# Patient Record
Sex: FEMALE | Race: WHITE | NOT HISPANIC OR LATINO | Employment: FULL TIME | URBAN - METROPOLITAN AREA
[De-identification: names, ages, dates, MRNs, and addresses within clinical notes are randomized per-mention and may not be internally consistent; named-entity substitution may affect disease eponyms.]

---

## 2017-10-09 ENCOUNTER — CONVERSION ENCOUNTER (OUTPATIENT)
Dept: MAMMOGRAPHY | Facility: HOSPITAL | Age: 62
End: 2017-10-09

## 2018-02-16 ENCOUNTER — OFFICE VISIT CONVERTED (OUTPATIENT)
Dept: FAMILY MEDICINE CLINIC | Facility: CLINIC | Age: 63
End: 2018-02-16
Attending: FAMILY MEDICINE

## 2018-04-16 ENCOUNTER — OFFICE VISIT CONVERTED (OUTPATIENT)
Dept: FAMILY MEDICINE CLINIC | Facility: CLINIC | Age: 63
End: 2018-04-16
Attending: FAMILY MEDICINE

## 2018-06-11 ENCOUNTER — OFFICE VISIT CONVERTED (OUTPATIENT)
Dept: FAMILY MEDICINE CLINIC | Facility: CLINIC | Age: 63
End: 2018-06-11
Attending: FAMILY MEDICINE

## 2018-06-15 ENCOUNTER — OFFICE VISIT CONVERTED (OUTPATIENT)
Dept: ORTHOPEDIC SURGERY | Facility: CLINIC | Age: 63
End: 2018-06-15
Attending: PHYSICIAN ASSISTANT

## 2018-07-17 ENCOUNTER — OFFICE VISIT CONVERTED (OUTPATIENT)
Dept: FAMILY MEDICINE CLINIC | Facility: CLINIC | Age: 63
End: 2018-07-17
Attending: FAMILY MEDICINE

## 2018-07-17 ENCOUNTER — CONVERSION ENCOUNTER (OUTPATIENT)
Dept: FAMILY MEDICINE CLINIC | Facility: CLINIC | Age: 63
End: 2018-07-17

## 2018-09-10 ENCOUNTER — OFFICE VISIT CONVERTED (OUTPATIENT)
Dept: FAMILY MEDICINE CLINIC | Facility: CLINIC | Age: 63
End: 2018-09-10
Attending: FAMILY MEDICINE

## 2018-10-15 ENCOUNTER — OFFICE VISIT CONVERTED (OUTPATIENT)
Dept: FAMILY MEDICINE CLINIC | Facility: CLINIC | Age: 63
End: 2018-10-15
Attending: FAMILY MEDICINE

## 2018-10-29 ENCOUNTER — OFFICE VISIT CONVERTED (OUTPATIENT)
Dept: ORTHOPEDIC SURGERY | Facility: CLINIC | Age: 63
End: 2018-10-29
Attending: ORTHOPAEDIC SURGERY

## 2018-11-19 ENCOUNTER — CONVERSION ENCOUNTER (OUTPATIENT)
Dept: MAMMOGRAPHY | Facility: HOSPITAL | Age: 63
End: 2018-11-19

## 2018-11-26 ENCOUNTER — CONVERSION ENCOUNTER (OUTPATIENT)
Dept: MAMMOGRAPHY | Facility: HOSPITAL | Age: 63
End: 2018-11-26

## 2018-12-28 ENCOUNTER — CONVERSION ENCOUNTER (OUTPATIENT)
Dept: FAMILY MEDICINE CLINIC | Facility: CLINIC | Age: 63
End: 2018-12-28

## 2018-12-28 ENCOUNTER — OFFICE VISIT CONVERTED (OUTPATIENT)
Dept: FAMILY MEDICINE CLINIC | Facility: CLINIC | Age: 63
End: 2018-12-28
Attending: FAMILY MEDICINE

## 2019-04-03 ENCOUNTER — HOSPITAL ENCOUNTER (OUTPATIENT)
Dept: URGENT CARE | Facility: CLINIC | Age: 64
Discharge: HOME | End: 2019-04-03
Attending: NURSE PRACTITIONER

## 2019-04-05 LAB — BACTERIA SPEC AEROBE CULT: NORMAL

## 2019-06-28 ENCOUNTER — CONVERSION ENCOUNTER (OUTPATIENT)
Dept: FAMILY MEDICINE CLINIC | Facility: CLINIC | Age: 64
End: 2019-06-28

## 2019-06-28 ENCOUNTER — OFFICE VISIT CONVERTED (OUTPATIENT)
Dept: FAMILY MEDICINE CLINIC | Facility: CLINIC | Age: 64
End: 2019-06-28
Attending: FAMILY MEDICINE

## 2019-07-01 ENCOUNTER — HOSPITAL ENCOUNTER (OUTPATIENT)
Dept: OTHER | Facility: HOSPITAL | Age: 64
Discharge: HOME | End: 2019-07-01
Attending: FAMILY MEDICINE

## 2019-07-01 LAB
ALBUMIN SERPL-MCNC: 4 G/DL (ref 3.5–5)
ALBUMIN/GLOB SERPL: 1.3 {RATIO} (ref 1.4–2.6)
ALP SERPL-CCNC: 133 U/L (ref 43–160)
ALT SERPL-CCNC: 13 U/L (ref 10–40)
ANION GAP SERPL CALC-SCNC: 18 MMOL/L (ref 8–19)
AST SERPL-CCNC: 13 U/L (ref 15–50)
BILIRUB SERPL-MCNC: 0.35 MG/DL (ref 0.2–1.3)
BUN SERPL-MCNC: 20 MG/DL (ref 5–25)
BUN/CREAT SERPL: 21 {RATIO} (ref 6–20)
CALCIUM SERPL-MCNC: 9.2 MG/DL (ref 8.7–10.4)
CHLORIDE SERPL-SCNC: 106 MMOL/L (ref 99–111)
CHOLEST SERPL-MCNC: 130 MG/DL (ref 107–200)
CHOLEST/HDLC SERPL: 4.8 {RATIO} (ref 3–6)
CONV CO2: 25 MMOL/L (ref 22–32)
CONV TOTAL PROTEIN: 7.1 G/DL (ref 6.3–8.2)
CREAT UR-MCNC: 0.97 MG/DL (ref 0.5–0.9)
GFR SERPLBLD BASED ON 1.73 SQ M-ARVRAT: >60 ML/MIN/{1.73_M2}
GLOBULIN UR ELPH-MCNC: 3.1 G/DL (ref 2–3.5)
GLUCOSE SERPL-MCNC: 86 MG/DL (ref 65–99)
HDLC SERPL-MCNC: 27 MG/DL (ref 40–60)
LDLC SERPL CALC-MCNC: 73 MG/DL (ref 70–100)
OSMOLALITY SERPL CALC.SUM OF ELEC: 302 MOSM/KG (ref 273–304)
POTASSIUM SERPL-SCNC: 3.7 MMOL/L (ref 3.5–5.3)
SODIUM SERPL-SCNC: 145 MMOL/L (ref 135–147)
TRIGL SERPL-MCNC: 149 MG/DL (ref 40–150)
VLDLC SERPL-MCNC: 30 MG/DL (ref 5–37)

## 2019-08-30 ENCOUNTER — OFFICE VISIT CONVERTED (OUTPATIENT)
Dept: FAMILY MEDICINE CLINIC | Facility: CLINIC | Age: 64
End: 2019-08-30
Attending: FAMILY MEDICINE

## 2019-10-25 ENCOUNTER — CONVERSION ENCOUNTER (OUTPATIENT)
Dept: FAMILY MEDICINE CLINIC | Facility: CLINIC | Age: 64
End: 2019-10-25

## 2019-10-25 ENCOUNTER — OFFICE VISIT CONVERTED (OUTPATIENT)
Dept: FAMILY MEDICINE CLINIC | Facility: CLINIC | Age: 64
End: 2019-10-25
Attending: FAMILY MEDICINE

## 2019-11-22 ENCOUNTER — OFFICE VISIT CONVERTED (OUTPATIENT)
Dept: ORTHOPEDIC SURGERY | Facility: CLINIC | Age: 64
End: 2019-11-22
Attending: ORTHOPAEDIC SURGERY

## 2019-12-30 ENCOUNTER — HOSPITAL ENCOUNTER (OUTPATIENT)
Dept: OTHER | Facility: HOSPITAL | Age: 64
Discharge: HOME | End: 2019-12-30
Attending: FAMILY MEDICINE

## 2019-12-30 ENCOUNTER — OFFICE VISIT CONVERTED (OUTPATIENT)
Dept: FAMILY MEDICINE CLINIC | Facility: CLINIC | Age: 64
End: 2019-12-30
Attending: FAMILY MEDICINE

## 2019-12-30 LAB
ALBUMIN SERPL-MCNC: 4 G/DL (ref 3.5–5)
ALBUMIN/GLOB SERPL: 1.2 {RATIO} (ref 1.4–2.6)
ALP SERPL-CCNC: 137 U/L (ref 43–160)
ALT SERPL-CCNC: 15 U/L (ref 10–40)
ANION GAP SERPL CALC-SCNC: 18 MMOL/L (ref 8–19)
AST SERPL-CCNC: 16 U/L (ref 15–50)
BILIRUB SERPL-MCNC: 0.36 MG/DL (ref 0.2–1.3)
BUN SERPL-MCNC: 16 MG/DL (ref 5–25)
BUN/CREAT SERPL: 17 {RATIO} (ref 6–20)
CALCIUM SERPL-MCNC: 9.4 MG/DL (ref 8.7–10.4)
CHLORIDE SERPL-SCNC: 101 MMOL/L (ref 99–111)
CHOLEST SERPL-MCNC: 122 MG/DL (ref 107–200)
CHOLEST/HDLC SERPL: 3.8 {RATIO} (ref 3–6)
CONV CO2: 23 MMOL/L (ref 22–32)
CONV TOTAL PROTEIN: 7.4 G/DL (ref 6.3–8.2)
CREAT UR-MCNC: 0.93 MG/DL (ref 0.5–0.9)
GFR SERPLBLD BASED ON 1.73 SQ M-ARVRAT: >60 ML/MIN/{1.73_M2}
GLOBULIN UR ELPH-MCNC: 3.4 G/DL (ref 2–3.5)
GLUCOSE SERPL-MCNC: 119 MG/DL (ref 65–99)
HDLC SERPL-MCNC: 32 MG/DL (ref 40–60)
LDLC SERPL CALC-MCNC: 52 MG/DL (ref 70–100)
OSMOLALITY SERPL CALC.SUM OF ELEC: 290 MOSM/KG (ref 273–304)
POTASSIUM SERPL-SCNC: 3.4 MMOL/L (ref 3.5–5.3)
SODIUM SERPL-SCNC: 139 MMOL/L (ref 135–147)
TRIGL SERPL-MCNC: 191 MG/DL (ref 40–150)
VLDLC SERPL-MCNC: 38 MG/DL (ref 5–37)

## 2020-01-24 ENCOUNTER — HOSPITAL ENCOUNTER (OUTPATIENT)
Dept: GENERAL RADIOLOGY | Facility: HOSPITAL | Age: 65
Discharge: HOME | End: 2020-01-24
Attending: FAMILY MEDICINE

## 2020-02-03 ENCOUNTER — OFFICE VISIT CONVERTED (OUTPATIENT)
Dept: FAMILY MEDICINE CLINIC | Facility: CLINIC | Age: 65
End: 2020-02-03
Attending: FAMILY MEDICINE

## 2020-05-08 ENCOUNTER — TELEMEDICINE CONVERTED (OUTPATIENT)
Dept: FAMILY MEDICINE CLINIC | Facility: CLINIC | Age: 65
End: 2020-05-08
Attending: FAMILY MEDICINE

## 2020-06-05 ENCOUNTER — OFFICE VISIT CONVERTED (OUTPATIENT)
Dept: ORTHOPEDIC SURGERY | Facility: CLINIC | Age: 65
End: 2020-06-05
Attending: PHYSICIAN ASSISTANT

## 2020-07-09 ENCOUNTER — OFFICE VISIT CONVERTED (OUTPATIENT)
Dept: FAMILY MEDICINE CLINIC | Facility: CLINIC | Age: 65
End: 2020-07-09
Attending: FAMILY MEDICINE

## 2020-07-28 ENCOUNTER — OFFICE VISIT CONVERTED (OUTPATIENT)
Dept: FAMILY MEDICINE CLINIC | Facility: CLINIC | Age: 65
End: 2020-07-28
Attending: INTERNAL MEDICINE

## 2020-07-28 ENCOUNTER — CONVERSION ENCOUNTER (OUTPATIENT)
Dept: FAMILY MEDICINE CLINIC | Facility: CLINIC | Age: 65
End: 2020-07-28

## 2020-08-08 ENCOUNTER — HOSPITAL ENCOUNTER (OUTPATIENT)
Dept: OTHER | Facility: HOSPITAL | Age: 65
Discharge: HOME | End: 2020-08-08
Attending: INTERNAL MEDICINE

## 2020-08-08 LAB
25(OH)D3 SERPL-MCNC: 15.6 NG/ML (ref 30–100)
ALBUMIN SERPL-MCNC: 3.8 G/DL (ref 3.5–5)
ALBUMIN/GLOB SERPL: 1.2 {RATIO} (ref 1.4–2.6)
ALP SERPL-CCNC: 117 U/L (ref 43–160)
ALT SERPL-CCNC: 13 U/L (ref 10–40)
ANION GAP SERPL CALC-SCNC: 18 MMOL/L (ref 8–19)
AST SERPL-CCNC: 14 U/L (ref 15–50)
BASOPHILS # BLD AUTO: 0.06 10*3/UL (ref 0–0.2)
BASOPHILS NFR BLD AUTO: 0.9 % (ref 0–3)
BILIRUB SERPL-MCNC: 0.48 MG/DL (ref 0.2–1.3)
BUN SERPL-MCNC: 21 MG/DL (ref 5–25)
BUN/CREAT SERPL: 19 {RATIO} (ref 6–20)
CALCIUM SERPL-MCNC: 10 MG/DL (ref 8.7–10.4)
CHLORIDE SERPL-SCNC: 107 MMOL/L (ref 99–111)
CHOLEST SERPL-MCNC: 135 MG/DL (ref 107–200)
CHOLEST/HDLC SERPL: 5.4 {RATIO} (ref 3–6)
CONV ABS IMM GRAN: 0.03 10*3/UL (ref 0–0.2)
CONV CO2: 20 MMOL/L (ref 22–32)
CONV IMMATURE GRAN: 0.4 % (ref 0–1.8)
CONV TOTAL PROTEIN: 7.1 G/DL (ref 6.3–8.2)
CREAT UR-MCNC: 1.08 MG/DL (ref 0.5–0.9)
DEPRECATED RDW RBC AUTO: 48.7 FL (ref 36.4–46.3)
EOSINOPHIL # BLD AUTO: 0.28 10*3/UL (ref 0–0.7)
EOSINOPHIL # BLD AUTO: 4.1 % (ref 0–7)
ERYTHROCYTE [DISTWIDTH] IN BLOOD BY AUTOMATED COUNT: 14.7 % (ref 11.7–14.4)
EST. AVERAGE GLUCOSE BLD GHB EST-MCNC: 111 MG/DL
GFR SERPLBLD BASED ON 1.73 SQ M-ARVRAT: 55 ML/MIN/{1.73_M2}
GLOBULIN UR ELPH-MCNC: 3.3 G/DL (ref 2–3.5)
GLUCOSE SERPL-MCNC: 98 MG/DL (ref 65–99)
HBA1C MFR BLD: 5.5 % (ref 3.5–5.7)
HCT VFR BLD AUTO: 37.7 % (ref 37–47)
HDLC SERPL-MCNC: 25 MG/DL (ref 40–60)
HGB BLD-MCNC: 12.2 G/DL (ref 12–16)
LDLC SERPL CALC-MCNC: 77 MG/DL (ref 70–100)
LYMPHOCYTES # BLD AUTO: 1.89 10*3/UL (ref 1–5)
LYMPHOCYTES NFR BLD AUTO: 28 % (ref 20–45)
MCH RBC QN AUTO: 29.6 PG (ref 27–31)
MCHC RBC AUTO-ENTMCNC: 32.4 G/DL (ref 33–37)
MCV RBC AUTO: 91.5 FL (ref 81–99)
MONOCYTES # BLD AUTO: 0.56 10*3/UL (ref 0.2–1.2)
MONOCYTES NFR BLD AUTO: 8.3 % (ref 3–10)
NEUTROPHILS # BLD AUTO: 3.93 10*3/UL (ref 2–8)
NEUTROPHILS NFR BLD AUTO: 58.3 % (ref 30–85)
NRBC CBCN: 0 % (ref 0–0.7)
OSMOLALITY SERPL CALC.SUM OF ELEC: 295 MOSM/KG (ref 273–304)
PLATELET # BLD AUTO: 219 10*3/UL (ref 130–400)
PMV BLD AUTO: 11.1 FL (ref 9.4–12.3)
POTASSIUM SERPL-SCNC: 4 MMOL/L (ref 3.5–5.3)
RBC # BLD AUTO: 4.12 10*6/UL (ref 4.2–5.4)
SODIUM SERPL-SCNC: 141 MMOL/L (ref 135–147)
TRIGL SERPL-MCNC: 164 MG/DL (ref 40–150)
VLDLC SERPL-MCNC: 33 MG/DL (ref 5–37)
WBC # BLD AUTO: 6.75 10*3/UL (ref 4.8–10.8)

## 2020-09-25 ENCOUNTER — HOSPITAL ENCOUNTER (OUTPATIENT)
Dept: OTHER | Facility: HOSPITAL | Age: 65
Discharge: HOME | End: 2020-09-25
Attending: INTERNAL MEDICINE

## 2020-09-25 LAB
ANION GAP SERPL CALC-SCNC: 14 MMOL/L (ref 8–19)
BUN SERPL-MCNC: 14 MG/DL (ref 5–25)
BUN/CREAT SERPL: 15 {RATIO} (ref 6–20)
CALCIUM SERPL-MCNC: 8.6 MG/DL (ref 8.7–10.4)
CHLORIDE SERPL-SCNC: 105 MMOL/L (ref 99–111)
CHOLEST SERPL-MCNC: 123 MG/DL (ref 107–200)
CHOLEST/HDLC SERPL: 4.6 {RATIO} (ref 3–6)
CONV CO2: 26 MMOL/L (ref 22–32)
CREAT UR-MCNC: 0.96 MG/DL (ref 0.5–0.9)
GFR SERPLBLD BASED ON 1.73 SQ M-ARVRAT: >60 ML/MIN/{1.73_M2}
GLUCOSE SERPL-MCNC: 93 MG/DL (ref 65–99)
HDLC SERPL-MCNC: 27 MG/DL (ref 40–60)
LDLC SERPL CALC-MCNC: 73 MG/DL (ref 70–100)
OSMOLALITY SERPL CALC.SUM OF ELEC: 294 MOSM/KG (ref 273–304)
POTASSIUM SERPL-SCNC: 2.8 MMOL/L (ref 3.5–5.3)
SODIUM SERPL-SCNC: 142 MMOL/L (ref 135–147)
TRIGL SERPL-MCNC: 117 MG/DL (ref 40–150)
VLDLC SERPL-MCNC: 23 MG/DL (ref 5–37)

## 2020-11-23 ENCOUNTER — OFFICE VISIT CONVERTED (OUTPATIENT)
Dept: ORTHOPEDIC SURGERY | Facility: CLINIC | Age: 65
End: 2020-11-23
Attending: PHYSICIAN ASSISTANT

## 2020-12-22 ENCOUNTER — OFFICE VISIT CONVERTED (OUTPATIENT)
Dept: FAMILY MEDICINE CLINIC | Facility: CLINIC | Age: 65
End: 2020-12-22
Attending: INTERNAL MEDICINE

## 2021-02-12 ENCOUNTER — OFFICE VISIT CONVERTED (OUTPATIENT)
Dept: FAMILY MEDICINE CLINIC | Facility: CLINIC | Age: 66
End: 2021-02-12
Attending: NURSE PRACTITIONER

## 2021-02-27 ENCOUNTER — HOSPITAL ENCOUNTER (OUTPATIENT)
Dept: OTHER | Facility: HOSPITAL | Age: 66
Discharge: HOME | End: 2021-02-27
Attending: NURSE PRACTITIONER

## 2021-02-27 LAB
ALBUMIN SERPL-MCNC: 4 G/DL (ref 3.5–5)
ALBUMIN/GLOB SERPL: 1.2 {RATIO} (ref 1.4–2.6)
ALP SERPL-CCNC: 144 U/L (ref 43–160)
ALT SERPL-CCNC: 12 U/L (ref 10–40)
ANION GAP SERPL CALC-SCNC: 13 MMOL/L (ref 8–19)
AST SERPL-CCNC: 13 U/L (ref 15–50)
BASOPHILS # BLD AUTO: 0.09 10*3/UL (ref 0–0.2)
BASOPHILS NFR BLD AUTO: 0.9 % (ref 0–3)
BILIRUB SERPL-MCNC: 0.45 MG/DL (ref 0.2–1.3)
BUN SERPL-MCNC: 19 MG/DL (ref 5–25)
BUN/CREAT SERPL: 19 {RATIO} (ref 6–20)
CALCIUM SERPL-MCNC: 9.1 MG/DL (ref 8.7–10.4)
CHLORIDE SERPL-SCNC: 108 MMOL/L (ref 99–111)
CHOLEST SERPL-MCNC: 142 MG/DL (ref 107–200)
CHOLEST/HDLC SERPL: 4.1 {RATIO} (ref 3–6)
CONV ABS IMM GRAN: 0.04 10*3/UL (ref 0–0.2)
CONV CO2: 24 MMOL/L (ref 22–32)
CONV IMMATURE GRAN: 0.4 % (ref 0–1.8)
CONV TOTAL PROTEIN: 7.4 G/DL (ref 6.3–8.2)
CREAT UR-MCNC: 1.01 MG/DL (ref 0.5–0.9)
DEPRECATED RDW RBC AUTO: 47.7 FL (ref 36.4–46.3)
EOSINOPHIL # BLD AUTO: 0.15 10*3/UL (ref 0–0.7)
EOSINOPHIL # BLD AUTO: 1.6 % (ref 0–7)
ERYTHROCYTE [DISTWIDTH] IN BLOOD BY AUTOMATED COUNT: 13.9 % (ref 11.7–14.4)
GFR SERPLBLD BASED ON 1.73 SQ M-ARVRAT: 59 ML/MIN/{1.73_M2}
GLOBULIN UR ELPH-MCNC: 3.4 G/DL (ref 2–3.5)
GLUCOSE SERPL-MCNC: 87 MG/DL (ref 65–99)
HCT VFR BLD AUTO: 42.1 % (ref 37–47)
HDLC SERPL-MCNC: 35 MG/DL (ref 40–60)
HGB BLD-MCNC: 13.4 G/DL (ref 12–16)
LDLC SERPL CALC-MCNC: 89 MG/DL (ref 70–100)
LYMPHOCYTES # BLD AUTO: 2.07 10*3/UL (ref 1–5)
LYMPHOCYTES NFR BLD AUTO: 21.5 % (ref 20–45)
MCH RBC QN AUTO: 29.6 PG (ref 27–31)
MCHC RBC AUTO-ENTMCNC: 31.8 G/DL (ref 33–37)
MCV RBC AUTO: 93.1 FL (ref 81–99)
MONOCYTES # BLD AUTO: 0.58 10*3/UL (ref 0.2–1.2)
MONOCYTES NFR BLD AUTO: 6 % (ref 3–10)
NEUTROPHILS # BLD AUTO: 6.7 10*3/UL (ref 2–8)
NEUTROPHILS NFR BLD AUTO: 69.6 % (ref 30–85)
NRBC CBCN: 0 % (ref 0–0.7)
OSMOLALITY SERPL CALC.SUM OF ELEC: 294 MOSM/KG (ref 273–304)
PLATELET # BLD AUTO: 272 10*3/UL (ref 130–400)
PMV BLD AUTO: 10.5 FL (ref 9.4–12.3)
POTASSIUM SERPL-SCNC: 4.3 MMOL/L (ref 3.5–5.3)
RBC # BLD AUTO: 4.52 10*6/UL (ref 4.2–5.4)
SODIUM SERPL-SCNC: 141 MMOL/L (ref 135–147)
TRIGL SERPL-MCNC: 89 MG/DL (ref 40–150)
TSH SERPL-ACNC: 2.57 M[IU]/L (ref 0.27–4.2)
VLDLC SERPL-MCNC: 18 MG/DL (ref 5–37)
WBC # BLD AUTO: 9.63 10*3/UL (ref 4.8–10.8)

## 2021-04-12 ENCOUNTER — OFFICE VISIT CONVERTED (OUTPATIENT)
Dept: FAMILY MEDICINE CLINIC | Facility: CLINIC | Age: 66
End: 2021-04-12
Attending: PHYSICIAN ASSISTANT

## 2021-04-12 ENCOUNTER — HOSPITAL ENCOUNTER (OUTPATIENT)
Dept: GENERAL RADIOLOGY | Facility: HOSPITAL | Age: 66
Discharge: HOME | End: 2021-04-12
Attending: PHYSICIAN ASSISTANT

## 2021-04-30 ENCOUNTER — HOSPITAL ENCOUNTER (OUTPATIENT)
Dept: GENERAL RADIOLOGY | Facility: HOSPITAL | Age: 66
Discharge: HOME | End: 2021-04-30
Attending: NURSE PRACTITIONER

## 2021-05-07 ENCOUNTER — CONVERSION ENCOUNTER (OUTPATIENT)
Dept: ORTHOPEDIC SURGERY | Facility: CLINIC | Age: 66
End: 2021-05-07

## 2021-05-07 ENCOUNTER — OFFICE VISIT CONVERTED (OUTPATIENT)
Dept: ORTHOPEDIC SURGERY | Facility: CLINIC | Age: 66
End: 2021-05-07
Attending: ORTHOPAEDIC SURGERY

## 2021-05-10 NOTE — H&P
History and Physical      Patient Name: Loan Nam   Patient ID: [id number]   Sex: Female   YOB: 1955    Primary Care Provider: Willy Kumar DO   Referring Provider: Willy Kumar DO    Visit Date: July 28, 2020    Provider: Willy Kumar DO   Location: [facility]   Location Address: [address] [place] [address], [address]  [place], [place]  [id number]   Location Phone: [phone]          Chief Complaint  · New Patient/Establish Care      History Of Present Illness  Loan Nam is a 64 year old /White female who presents for evaluation and treatment of:      Pt is here to establish care. Pt previous PCP was Dr. Oracio Mccoy.    Pt states that she feels like she not over her sinus infection. She feels like she can smell the infection. Patient reports some sinus pressure and congestion.    Pt states that she would like to take less pills for her BP. Pt states that the last medication she was given the amlodipine caused her feet to swell a lot. Patient currently on the 10 mg dosing.    Pt would like a suggestion on how to wear her mask or face shield while she at work because it gets hot during the day. Pt states that she works at [employer]. Pt states that she can barely in the mask because she has asthma and sinus infection. Pt states that she wears gator mask all day.    Pt has history of HTN, depression, asthma, bronchitis, acid reflex, and osteoarthritis.     Pt had flu vaccine 9/2019. Pt not due for mammogram until 10/2020.       Past Medical History  Disease Name Date Onset Notes   Allergic rhinitis --  --    Anemia, Unspecified --  --    Arthritis of Left Knee 07/28/2016 --    Asthma --  --    Bile salt-induced diarrhea --  --    Depression --  --    History of Pulmonary Embolism --  --    Hypertension --  --    Moderate episode of recurrent major depressive disorder 02/03/2020 --    Muscle cramp, nocturnal 07/17/2018 --    Reflux --  --    Seasonal allergies --  --     Venous insufficiency of left leg 07/17/2018 She needs to elevated nad wear compression hose. 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         Past Surgical History  Procedure Name Date Notes   *Other Surgery --  mass removal on neck    Appendectomy 1977 --    C-sections 1977 --    Cesarian Section --  --    Cholecystectomy 1977 --    Gallbladder --  --    Hysterectomy 2004 --    Tonsillectomy --  --          Medication List  Name Date Started Instructions   amlodipine 5 mg oral tablet 07/28/2020 take 1 tablet (5 mg) by oral route once daily for 90 days   buspirone 15 mg oral tablet 07/28/2020 take 1 tablet (15 mg) by oral route 2 times per day for 90 days   doxycycline monohydrate 100 mg oral capsule 07/28/2020 take 1 capsule (100 mg) by oral route 2 times per day for 10 days   hydrochlorothiazide 25 mg oral tablet 07/28/2020 take 1 tablet (25 mg) by oral route once daily for 90 days   lisinopril 40 mg oral tablet 07/28/2020 take 1 tablet (40 mg) by oral route once daily for 90 days   naproxen 500 mg oral tablet 07/28/2020 take 1 tablet (500 mg) by oral route 2 times per day with food for 90 days   omeprazole 20 mg oral capsule,delayed release(DR/EC) 07/28/2020 take 1 capsule (20 mg) by oral route once daily before a meal for 90 days   Ventolin HFA 90 mcg/actuation inhalation HFA aerosol inhaler 10/25/2019 inhale 1 - 2 puffs (90 - 180 mcg) by inhalation route every 6 hours as needed for 30 days         Allergy List  Allergen Name Date Reaction Notes   Allergy to IVP dye --  --  --    PENICILLINS --  --  --    SULFA (SULFONAMIDES) --  --  --          Family Medical History  Disease Name Relative/Age Notes   Hypertension Father/  Mother/  Sister/   --    Cancer, Unspecified Father/  Mother/  Sister/   Mother; Father; Sister  Mother; Father   Blood Diseases Father/   Father   Family history of certain chronic disabling diseases; arthritis Mother/  Sister/   Mother; Sister  Mother; Sister  Mother;  Sister; Son   Osteoporosis Mother/   Mother   Family history of Arthritis Mother/   Mother         Reproductive History  Menstrual   Age Menarche: 14   Pregnancy Summary   Total Pregnancies: 1 Full Term: 1 Premature: 0   Ab Induced: 0 Ab Spontaneous: 0 Ectopics: 0   Multiples: 0 Livin         Social History  Finding Status Start/Stop Quantity Notes   Alcohol Never --/-- --  2020 - 10/15/2018 -     --  --/-- --  --    lives alone --  --/-- --  --    No known infection risk --  --/-- --  --    Recreational Drug Use Never --/-- --  no   Sedentary --  --/-- --  --    Tobacco Never --/-- --  never smoker   Working --  --/-- --  --          Immunizations  NameDate Admin Mfg Trade Name Lot Number Route Inj VIS Given VIS Publication   Smmteblym96/30/2019 SKB Fluarix, quadrivalent, preservative free [id number] NE NE 2019    Comments: [facility]   Akzqsjlkq86/16/2014 NE FLUVIRIN [id number]  RA 10/06/2014    Comments: RECIEVED AT [facility]   Tqysgzwgfchq14/06/2014 MSD PNEUMOVAX 23 [id number]  RD 2014 10/06/2009   Comments: tolerated well   Tdap2010 SKB BOOSTRIX  NE NE 2015   Comments:          Review of Systems  · Constitutional  o Denies  o : fatigue, night sweats  · Eyes  o Denies  o : double vision, blurred vision  · HENT  o Admits  o : sinus pain, nasal congestion, nasal discharge, postnasal drip  o Denies  o : vertigo, recent head injury, ear pain  · Cardiovascular  o Admits  o : lower extremity edema  o Denies  o : chest pain, irregular heart beats, dyspnea on exertion, orthopnea  · Respiratory  o Denies  o : shortness of breath, productive cough  · Gastrointestinal  o Denies  o : nausea, vomiting  · Genitourinary  o Denies  o : dysuria, urinary retention  · Integument  o Denies  o : hair growth change, new skin lesions  · Neurologic  o Denies  o : altered mental status, seizures  · Musculoskeletal  o Denies  o : joint swelling, limitation of  "motion  · Endocrine  o Denies  o : cold intolerance, heat intolerance  · Psychiatric  o Denies  o : anxiety, depression  · Heme-Lymph  o Denies  o : petechiae, lymph node enlargement or tenderness  · Allergic-Immunologic  o Denies  o : frequent illnesses      Vitals  Date Time BP Position Site L\R Cuff Size HR RR TEMP (F) WT  HT  BMI kg/m2 BSA m2 O2 Sat HC       07/28/2020 02:05 /75 Sitting    77 - R   230lbs 2oz 4'  11\" 46.48 2.08 100 %          Physical Examination  · Constitutional  o Appearance  o : alert, oriented, in no acute distress, well developed, well-nourished  · Eyes  o Vision  o : Conjuntivae: Normal, Sclerae white, Pupils: PERRL, Cornea: Clear, no lesions bilateral  · Ears, Nose, Mouth and Throat  o Ears  o : Ext. Ears: Normal shape, Non tender, EACs: Normal , Tragus intact bilaterally, Hearing: intact to conversational voice bilaterally  o Nose  o : No nasal discharge, Mucosa: normal, Septum: midline, Sinuses: TTP in the frontal and maxillary sinuses  o Throat  o : Oropharynx: no inflmation or lesions, no purulence or drainage  · Neck  o Inspection/Palpation  o : Supple, no masses or tenderness, no deformities, Trachea: Midline, ROM: with in normal limits, no neck stiffness, no lymphadenopathy  o Thyroid  o : no thyomegaly, no palpabale masses   · Respiratory  o Auscultation of Lungs  o : normal breath sounds throughout, no wheeze, rhonchi, or crackles  · Cardiovascular  o Heart  o : Regular rate and rhythm, Normal S1,S2 , No cardiac murmers, No S3 or S4 gallop or rubs  · Gastrointestinal  o Abdominal Examination  o : abdomen soft, nontender, non distended, no rigidity, gaurding, rebound tenderness, no ventral hernias present  o Liver and spleen  o : no hepatomegaly present, liver nontender to palpation, spleen not palpable  · Skin and Subcutaneous Tissue  o General Inspection  o : no rashes on visible skin, normal skin color, warm and dry  o Digits and Nails  o : no clubbing, cyanosis, " deformities or edema present, normal appearing nails  · Neurologic  o Mental Status Examination  o : alert and oriented to time, place, and person. Gait and Station: normal gait, able to stand without difficulty. CN 2-12 grossly intact   · Psychiatric  o Judgment and Insight  o : judgment and insight intact  o Mood and Affect  o : normal mood and affect          Assessment  · Visit for screening mammogram     V76.12/Z12.31  · Anemia     285.9/D64.9  · Anxiety disorder     300.00/F41.9  · Essential hypertension     401.9/I10  Given significant swelling on 10 mg of amlodipine will decrease to 5 mg daily and increase the HCTZ to 25 mg daily.  · Vitamin D deficiency     268.9/E55.9  · Establishing care with new doctor, encounter for     V65.8/Z76.89  · Allergic rhinitis     477.9/J30.9  · Hypertension     401.9/I10  · Reflux     530.81/K21.9    Problems Reconciled  Plan  · Orders  o Screening Mammography; Bilateral 3D (41982, , 61132) - V76.12/Z12.31 - 07/28/2020  o ACO - Pt declines to or was not able to provide an Advance Care Plan or name a Surrogate Decision Maker (1124F) - - 07/28/2020  o CBC with Auto Diff [facility] (47490) - 285.9/D64.9 - 07/28/2020  o CMP [facility] (47483) - 401.9/I10 - 07/28/2020  o Hgb A1c [facility] (55210) - 401.9/I10 - 07/28/2020  o Lipid Panel [facility] (47070) - 401.9/I10 - 07/28/2020  o Vitamin D (25-Hydroxy) Level (61676) - 268.9/E55.9 - 07/28/2020  o ACO-39: Current medications updated and reviewed () - - 07/28/2020  o ACO-15: Pneumococcal Vaccine Administered or Previously Received (4040F) - - 07/28/2020  o ACO-14: Influenza immunization administered or previously received () - - 07/28/2020  · Medications  o buspirone 15 mg oral tablet   SIG: take 1 tablet (15 mg) by oral route 2 times per day for 90 days   DISP: (180) tablet with 1 refills  Prescribed on 07/28/2020     o amlodipine 5 mg oral tablet   SIG: take 1 tablet (5 mg) by oral route once daily for 90 days   DISP: (90) tablets with 1  refills  Adjusted on 07/28/2020     o hydrochlorothiazide 25 mg oral tablet   SIG: take 1 tablet (25 mg) by oral route once daily for 90 days   DISP: (90) tablets with 1 refills  Adjusted on 07/28/2020     o doxycycline monohydrate 100 mg oral capsule   SIG: take 1 capsule (100 mg) by oral route 2 times per day for 10 days   DISP: (20) capsules with 0 refills  Refilled on 07/28/2020     o lisinopril 40 mg oral tablet   SIG: take 1 tablet (40 mg) by oral route once daily for 90 days   DISP: (90) Tablet with 1 refills  Refilled on 07/28/2020     o naproxen 500 mg oral tablet   SIG: take 1 tablet (500 mg) by oral route 2 times per day with food for 90 days   DISP: (180) tablet with 1 refills  Refilled on 07/28/2020     o omeprazole 20 mg oral capsule,delayed release(DR/EC)   SIG: take 1 capsule (20 mg) by oral route once daily before a meal for 90 days   DISP: (90) capsule with 3 refills  Refilled on 07/28/2020     o metoprolol succinate 50 mg oral tablet extended release 24 hr   SIG: TAKE 1 TABLET BY MOUTH EVERY DAY   DISP: (90) Tablet with 4 refills  Discontinued on 07/28/2020     · Instructions  o Patient advised to monitor blood pressure (B/P) at home and journal readings. Patient informed that a B/P reading at home of more than 130/80 is considered hypertension. For readings greater jpif826/90 or higher patient is advised to follow up in the office with readings for management. Patient advised to limit sodium intake.  o Patient was educated/instructed on their diagnosis, treatment and medications prior to discharge from the clinic today.  o Patient was instructed to exercise regularly.  o Patient instructed to seek medical attention urgently for new or worsening symptoms.  o Call the office with any concerns or questions.  o Minutes spent with patient including greater than 50% in Education/Counseling/Care Coordination.  o Time spent with the patient was minutes, more than 50% face to face.  o Chronic conditions  reviewed and taken into consideration for today's treatment plan.  o Discussed Covid-19 precautions including, but not limited to, social distancing, avoid touching your face, and hand washing.   · Disposition  o Follow up in three months            Electronically Signed by: Willy Kumar DO -Author on August 5, 2020 12:44:09 AM

## 2021-05-13 NOTE — PROGRESS NOTES
Progress Note      Patient Name: Loan Nam   Patient ID: [id number]   Sex: Female   YOB: 1955    Primary Care Provider: Willy Kumar DO   Referring Provider: Willy Kumar DO    Visit Date: November 23, 2020    Provider: Mildred Marcum PA-C   Location: [facility]   Location Address: [address] [place] [address]  [id number]   Location Phone: [phone]          Chief Complaint  · Follow up bilateral knee pain      History Of Present Illness  Loan Nam is a 65 year old /White female who presents today to [facility].      She is here for bilateral knee pain. She has documented OA. She requests injections today.       Past Medical History  Allergic rhinitis; Anemia, Unspecified; Arthritis of Left Knee; Asthma; Bile salt-induced diarrhea; Depression; History of Pulmonary Embolism; Hypertension; Moderate episode of recurrent major depressive disorder; Muscle cramp, nocturnal; Reflux; Seasonal allergies; Venous insufficiency of left leg         Past Surgical History  *Other Surgery; Appendectomy; C-sections; Cesarian Section; Cholecystectomy; Gallbladder; Hysterectomy; Tonsillectomy         Medication List  amlodipine 5 mg oral tablet; buspirone 15 mg oral tablet; doxycycline monohydrate 100 mg oral capsule; ergocalciferol (vitamin D2) 1,250 mcg (50,000 unit) oral capsule; hydrochlorothiazide 25 mg oral tablet; lisinopril 40 mg oral tablet; naproxen 500 mg oral tablet; omeprazole 20 mg oral capsule,delayed release(DR/EC); potassium chloride 20 mEq oral tablet extended release; Ventolin HFA 90 mcg/actuation inhalation HFA aerosol inhaler         Allergy List  Allergy to IVP dye; PENICILLINS; SULFA (SULFONAMIDES)       Allergies Reconciled  Family Medical History  Hypertension; Cancer, Unspecified; Blood Diseases; Family history of certain chronic disabling diseases; arthritis; Osteoporosis; Family history of Arthritis         Reproductive  "History   1 Para 1 0 0 1       Social History  Alcohol (Never); ; lives alone; No known infection risk; Recreational Drug Use (Never); Sedentary; Tobacco (Never); Working         Review of Systems  · Constitutional  o Denies  o : fever, chills, weight loss  · Cardiovascular  o Denies  o : chest pain, shortness of breath  · Gastrointestinal  o Denies  o : liver disease, heartburn, nausea, blood in stools  · Genitourinary  o Denies  o : painful urination, blood in urine  · Integument  o Denies  o : rash, itching  · Neurologic  o Denies  o : headache, weakness, loss of consciousness  · Musculoskeletal  o Admits  o : painful, swollen joints  · Psychiatric  o Denies  o : drug/alcohol addiction, anxiety, depression      Vitals  Date Time BP Position Site L\R Cuff Size HR RR TEMP (F) WT  HT  BMI kg/m2 BSA m2 O2 Sat FR L/min FiO2        2020 08:26 AM      74 - R   229lbs 0oz 4'  11\" 46.25 2.08 99 %            Physical Examination  · Constitutional  o Appearance  o : well developed, well-nourished, no obvious deformities present  · Head and Face  o Head  o :   § Inspection  § : normocephalic  o Face  o :   § Inspection  § : no facial lesions  · Eyes  o Conjunctivae  o : conjunctivae normal  o Sclerae  o : sclerae white  · Ears, Nose, Mouth and Throat  o Ears  o :   § External Ears  § : appearance within normal limits  § Hearing  § : intact  o Nose  o :   § External Nose  § : appearance normal  · Neck  o Inspection/Palpation  o : normal appearance  o Range of Motion  o : full range of motion  · Respiratory  o Respiratory Effort  o : breathing unlabored  o Inspection of Chest  o : normal appearance  o Auscultation of Lungs  o : no audible wheezing or rales  · Cardiovascular  o Heart  o : regular rate  · Gastrointestinal  o Abdominal Examination  o : soft and non-tender  · Skin and Subcutaneous Tissue  o General Inspection  o : intact, no rashes  · Psychiatric  o General  o : Alert and oriented " x3  o Judgement and Insight  o : judgment and insight intact  o Mood and Affect  o : mood normal, affect appropriate  · Extremities  o Extremities  o : BILATERAL KNEES: No skin discoloration, atrophy or swelling. Full extension. Full flexion. Mild tenderness on medial and lateral joint line. Calf supple, nontender. Neurovascularly and sensation grossly intact.   · Injection Note/Aspiration Note  o Site  o : bilateral knees   o Procedure  o : Procedure: After educating the patient, patient gave consent for procedure. After using Chloraprep, the joint space was injected. The patient tolerated the procedure well.   o Medication  o : 80 mg of DepoMedrol with 9cc of 1% Lidocaine          Assessment  · Primary osteoarthritis of right knee     715.16/M17.11  · Primary osteoarthritis of left knee     715.16/M17.12  · Pain: Knee     719.46/M25.569      Plan  · Orders  o 2 - Depo-Medrol injection 80mg () - 719.46/M25.569 - 11/23/2020   Lot [id number] exp 09 21 Andrew DE LUNA  o 2 - Knee Intra-articular Injection without US Guidance [facility] ([id number]) - 719.46/M25.569 - 11/23/2020   Lot [id number] exp 08 21 Robert DE LUNA  · Medications  o Medications have been Reconciled  o Transition of Care or Provider Policy  · Instructions  o Reviewed the patient's Past Medical, Social, and Family history as well as the ROS at today's visit, no changes.  o Call or return if worsening symptoms.  o Bilateral knee injections today. We also discussed conservative treatment of OA with OTC medications. She will follow up PRN.  o Electronically Identified Patient Education Materials Provided Electronically            Electronically Signed by: ANNAMARIE Dawson-C -Author on November 23, 2020 08:49:26 AM  Electronically Co-signed by: iMke Mckinney MD -Reviewer on November 23, 2020 09:03:48 PM

## 2021-05-13 NOTE — PROGRESS NOTES
Progress Note      Patient Name: Loan Nam   Patient ID: [id number]   Sex: Female   YOB: 1955    Primary Care Provider: Oracio Mccoy DO   Referring Provider: Helio Doran PA-C    Visit Date: July 9, 2020    Provider: Oracio Mccoy DO   Location: [facility]   Location Address: [address] [place] [address]  [id number]   Location Phone: [phone]          Chief Complaint  · pt c/o diarrhea, stomach pain, sinus congestion, pt states she has thrown up from all her drainage       History Of Present Illness  Loan Nam is a 64 year old /White female who presents for evaluation and treatment of: sinus. She has persistent sinus congestion, PND for the past4 days. She went to [facility], but feels worse. She states that the drainage causes nausea and vomiting.       Past Medical History  Disease Name Date Onset Notes   Allergic rhinitis --  --    Anemia, Unspecified --  --    Arthritis of Left Knee 07/28/2016 --    Asthma --  --    Bile salt-induced diarrhea --  --    Depression --  --    History of Pulmonary Embolism --  --    Hypertension --  --    Moderate episode of recurrent major depressive disorder 02/03/2020 --    Muscle cramp, nocturnal 07/17/2018 --    Reflux --  --    Seasonal allergies --  --    Venous insufficiency of left leg 07/17/2018 She needs to elevated nad wear compression hose. 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         Past Surgical History  Procedure Name Date Notes   *Other Surgery --  mass removal on neck    Appendectomy 1977 --    C-sections 1977 --    Cesarian Section --  --    Cholecystectomy 1977 --    Gallbladder --  --    Hysterectomy 2004 --    Tonsillectomy --  --          Medication List  Name Date Started Instructions   amlodipine 10 mg oral tablet 01/31/2020 TAKE 1 TABLET BY MOUTH EVERY DAY   hydrochlorothiazide 12.5 mg oral tablet 05/08/2020 take 1 tablet (12.5 mg) by oral route once  daily   lisinopril 40 mg oral tablet 2020 take 1 tablet (40 mg) by oral route once daily for 90 days   metoprolol succinate 50 mg oral tablet extended release 24 hr 03/10/2020 TAKE 1 TABLET BY MOUTH EVERY DAY   naproxen 500 mg oral tablet 2020 take 1 tablet (500 mg) by oral route 2 times per day with food for 90 days   omeprazole 20 mg oral capsule,delayed release(DR/EC) 2019 take 1 capsule (20 mg) by oral route once daily before a meal for 90 days   Ventolin HFA 90 mcg/actuation inhalation HFA aerosol inhaler 10/25/2019 inhale 1 - 2 puffs (90 - 180 mcg) by inhalation route every 6 hours as needed for 30 days         Allergy List  Allergen Name Date Reaction Notes   Allergy to IVP dye --  --  --    PENICILLINS --  --  --    SULFA (SULFONAMIDES) --  --  --          Family Medical History  Disease Name Relative/Age Notes   Hypertension Father/  Mother/  Sister/   --    Cancer, Unspecified Father/  Mother/  Sister/   Mother; Father; Sister  Mother; Father   Blood Diseases Father/   Father   Family history of certain chronic disabling diseases; arthritis Mother/  Sister/   Mother; Sister  Mother; Sister  Mother; Sister; Son   Osteoporosis Mother/   Mother   Family history of Arthritis Mother/   Mother         Reproductive History  Menstrual   Age Menarche: 14   Pregnancy Summary   Total Pregnancies: 1 Full Term: 1 Premature: 0   Ab Induced: 0 Ab Spontaneous: 0 Ectopics: 0   Multiples: 0 Livin         Social History  Finding Status Start/Stop Quantity Notes   Alcohol Never --/-- --  10/15/2018 -    Alcohol Use Never --/-- --  does not drink    --  --/-- --  --    . --  --/-- --  --    lives alone --  --/-- --  --    No known infection risk --  --/-- --  --    Recreational Drug Use Never --/-- --  no   Sedentary --  --/-- --  --    Tobacco Never --/-- --  never smoker   Working --  --/-- --  --          Immunizations  NameDate Admin Mfg Trade Name Lot Number Route Inj VIS Given VIS  "Publication   Yxdpjcnlq30/30/2019 SKB Fluarix, quadrivalent, preservative free [id number] NE NE 09/30/2019    Comments: [facility]   Obzvundpz39/16/2014 NE FLUVIRIN [id number] IM RA 10/06/2014    Comments: RECIEVED AT [facility]   Wcprlprblunj20/06/2014 MSD PNEUMOVAX 23 [id number] IM RD 06/06/2014 10/06/2009   Comments: tolerated well   Tdap06/01/2010 SKB BOOSTRIX  NE NE 08/17/2015 01/24/2012   Comments:          Review of Systems  · Constitutional  o Denies  o : fever, chills, night sweats  · HENT  o Admits  o : sinus pain, nasal congestion, postnasal drip  o Denies  o : sore throat  · Respiratory  o Denies  o : shortness of breath, wheezing, cough, dyspnea on exertion  · Gastrointestinal  o Admits  o : nausea, vomiting, diarrhea      Vitals  Date Time BP Position Site L\R Cuff Size HR RR TEMP (F) WT  HT  BMI kg/m2 BSA m2 O2 Sat        02/03/2020 11:29 /62 Sitting    76 - R   226lbs 8oz 4'  11\" 45.75 2.07 99 %    05/08/2020 02:39 /70 Sitting    64 - R           07/09/2020 01:44 /49 Sitting    72 - R  99.4 226lbs 8oz 4'  11\" 45.75 2.07 99 %          Physical Examination  · Constitutional  o Appearance  o : well-nourished, well developed, alert, in no acute distress, well-tended appearance  · Head and Face  o Head  o :   § Inspection  § : atraumatic, normocephalic  o Face  o :   § Inspection  § : no facial lesions  o HEENT  o : Unremarkable  · Eyes  o Conjunctivae  o : conjunctivae normal  o Sclerae  o : sclerae white  o Pupils and Irises  o : pupils equal and round, pupils reactive to light bilaterally  o Eyelids/Ocular Adnexae  o : eyelid appearance normal  · Ears, Nose, Mouth and Throat  o Ears  o :   § External Ears  § : appearance within normal limits, no lesions present  § Otoscopic Examination  § : tympanic membrane appearance within normal limits bilaterally without perforations, mobility normal  o Nose  o :   § External Nose  § : appearance normal  o Oral Cavity  o :   § Oral Mucosa  § : oral " mucosa normal  § Lips  § : lip appearance normal  § Teeth  § : normal dentition for age  § Gums  § : gums pink, non-swollen, no bleeding present  § Tongue  § : tongue appearance normal  § Palate  § : hard palate normal, soft palate appearance normal  o Throat  o :   § Oropharynx  § : no inflammation or lesions present, tonsils within normal limits  · Neck  o Inspection/Palpation  o : normal appearance, no masses or tenderness, trachea midline  o Thyroid  o : gland size normal, nontender, no nodules or masses present on palpation  · Respiratory  o Respiratory Effort  o : breathing unlabored  o Auscultation of Lungs  o : normal breath sounds  · Cardiovascular  o Heart  o :   § Auscultation of Heart  § : regular rate, normal rhythm, no murmurs present  · Lymphatic  o Neck  o : no lymphadenopathy           Assessment  · Sinusitis, acute     461.9/J01.90  Her symptoms have worsened over the past few days. Will add an abx  · Seasonal allergies     477.9/J30.2  worse      Plan  · Orders  o ACO-39: Current medications updated and reviewed () - - 07/09/2020  · Medications  o doxycycline monohydrate 100 mg oral capsule   SIG: take 1 capsule (100 mg) by oral route 2 times per day for 10 days   DISP: (20) capsules with 0 refills  Prescribed on 07/09/2020     o Medications have been Reconciled  o Transition of Care or Provider Policy  · Instructions  o Patient is taking medications as prescribed and doing well.   o Take all medications as prescribed/directed.  o Patient instructed/educated on their diet and exercise program.  o Patient was educated/instructed on their diagnosis, treatment and medications prior to discharge from the clinic today.  o Patient instructed to seek medical attention urgently for new or worsening symptoms.  o Call the office with any concerns or questions.  o Bring all medicines with their bottles to each office visit.  · Disposition  o Call or Return if symptoms worsen or  persist.            Electronically Signed by: Oracio Mccoy, DO -Author on July 9, 2020 02:02:05 PM

## 2021-05-13 NOTE — PROGRESS NOTES
Progress Note      Patient Name: Loan Nam   Patient ID: [id number]   Sex: Female   YOB: 1955    Primary Care Provider: Oracio Mccoy DO   Referring Provider: Helio Doran PA-C    Visit Date: May 8, 2020    Provider: Oracio Mccoy DO   Location: [facility]   Location Address: [address] [place] [address]  [id number]   Location Phone: [phone]          Chief Complaint  · f/u for moderate episode of major depressive disorder and left knee arthritis   · medication refill      History Of Present Illness  Video Conferencing Visit  Loan Nam is a 64 year old /White female who is presenting for evaluation via video conferencing. Verbal consent obtained before beginning visit.   The following staff were present during this visit: Cedric Irizarry LPN   Loan Nam is a 64 year old /White female who presents for evaluation and treatment of: depression. She states that her depression is better. She states that she did not get or take the Sertraline from our last visit.      She states that her right knee is improved, but she has been off work X 5 weeks due to Covid-19.       Past Medical History  Disease Name Date Onset Notes   Allergic rhinitis --  --    Anemia, Unspecified --  --    Arthritis of Left Knee 07/28/2016 --    Asthma --  --    Bile salt-induced diarrhea --  --    Depression --  --    History of Pulmonary Embolism --  --    Hypertension --  --    Moderate episode of recurrent major depressive disorder 02/03/2020 --    Muscle cramp, nocturnal 07/17/2018 --    Reflux --  --    Seasonal allergies --  --    Venous insufficiency of left leg 07/17/2018 She needs to elevated nad wear compression hose. 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         Past Surgical History  Procedure Name Date Notes   *Other Surgery --  mass removal on neck    Appendectomy 1977 --    C-sections 1977 --    Cesarian Section --  --     Cholecystectomy 1977 --    Gallbladder --  --    Hysterectomy 2004 --    Tonsillectomy --  --          Medication List  Name Date Started Instructions   amlodipine 10 mg oral tablet 01/31/2020 TAKE 1 TABLET BY MOUTH EVERY DAY   buspirone 15 mg oral tablet 10/25/2019 take 1 tablet (15 mg) by oral route 2 times per day for 90 days   fluticasone propionate 50 mcg/actuation nasal spray,suspension 10/25/2019 spray 1 spray (50 mcg) in each nostril by intranasal route once daily for 30 days   hydrochlorothiazide 12.5 mg oral tablet 10/25/2019 take 1 tablet (12.5 mg) by oral route once daily   lisinopril 40 mg oral tablet 10/25/2019 take 1 tablet (40 mg) by oral route once daily for 90 days   metoprolol succinate 50 mg oral tablet extended release 24 hr 03/10/2020 TAKE 1 TABLET BY MOUTH EVERY DAY   naproxen 500 mg oral tablet 03/19/2020 take 1 tablet (500 mg) by oral route 2 times per day with food for 90 days   omeprazole 20 mg oral capsule,delayed release(DR/EC) 07/25/2019 take 1 capsule (20 mg) by oral route once daily before a meal for 90 days   Ventolin HFA 90 mcg/actuation inhalation HFA aerosol inhaler 10/25/2019 inhale 1 - 2 puffs (90 - 180 mcg) by inhalation route every 6 hours as needed for 30 days         Allergy List  Allergen Name Date Reaction Notes   Allergy to IVP dye --  --  --    PENICILLINS --  --  --    SULFA (SULFONAMIDES) --  --  --          Family Medical History  Disease Name Relative/Age Notes   Hypertension Father/  Mother/  Sister/   --    Cancer, Unspecified Father/  Mother/  Sister/   Mother; Father; Sister  Mother; Father   Blood Diseases Father/   Father   Family history of certain chronic disabling diseases; arthritis Mother/  Sister/   Mother; Sister  Mother; Sister  Mother; Sister; Son   Osteoporosis Mother/   Mother   Family history of Arthritis Mother/   Mother         Reproductive History  Menstrual   Age Menarche: 14   Pregnancy Summary   Total Pregnancies: 1 Full Term: 1  "Premature: 0   Ab Induced: 0 Ab Spontaneous: 0 Ectopics: 0   Multiples: 0 Livin         Social History  Finding Status Start/Stop Quantity Notes   Alcohol Never --/-- --  10/15/2018 -    Alcohol Use Never --/-- --  does not drink    --  --/-- --  --    . --  --/-- --  --    lives alone --  --/-- --  --    No known infection risk --  --/-- --  --    Recreational Drug Use Never --/-- --  no   Sedentary --  --/-- --  --    Tobacco Never --/-- --  never smoker   Working --  --/-- --  --          Immunizations  NameDate Admin Mfg Trade Name Lot Number Route Inj VIS Given VIS Publication   Egwjopmib58/30/2019 SKB Fluarix, quadrivalent, preservative free [id number] NE NE 2019    Comments: [facility]   Lcsefdfgh90/16/2014 NE FLUVIRIN [id number]  RA 10/06/2014    Comments: RECIEVED AT [facility]   Eyfirrtvqdbh79/06/2014 MSD PNEUMOVAX 23 [id number]  RD 2014 10/06/2009   Comments: tolerated well   Tdap2010 SKB BOOSTRIX  NE NE 2015   Comments:          Review of Systems  · Constitutional  o Denies  o : fatigue  · HENT  o Denies  o : headaches  · Cardiovascular  o Denies  o : chest pain, irregular heart beats, rapid heart rate, dyspnea on exertion  · Respiratory  o Denies  o : shortness of breath, wheezing, cough, dyspnea on exertion  · Musculoskeletal  o Admits  o : knee pain  · Psychiatric  o Admits  o : depression  o Denies  o : suicidal ideation, homicidal ideation      Vitals  Date Time BP Position Site L\R Cuff Size HR RR TEMP (F) WT  HT  BMI kg/m2 BSA m2 O2 Sat        2019 08:23 /63 Sitting    78 - R   231lbs 0oz 4'  11\" 46.66 2.09 100 %    2020 11:29 /62 Sitting    76 - R   226lbs 8oz 4'  11\" 45.75 2.07 99 %    2020 02:39 /70 Sitting    64 - R                 Physical Examination  · Constitutional  o Appearance  o : well-nourished, well developed, no obvious deformities present  · Head and Face  o Head  o :   § Inspection  § : " atraumatic, normocephalic  o Face  o :   § Inspection  § : no facial lesions  · Respiratory  o Respiratory Effort  o : breathing unlabored          Assessment  · Arthritis of Left Knee     715.16/M17.10  stable. As she gets to weight bearing again I am sure it will start to get more painful  · Moderate episode of recurrent major depressive disorder     296.32/F33.1  improved w/o medication  · Hypertension     401.9/I10  stable      Plan  · Orders  o ACO-39: Current medications updated and reviewed () - - 05/08/2020  · Medications  o hydrochlorothiazide 12.5 mg oral tablet   SIG: take 1 tablet (12.5 mg) by oral route once daily   DISP: (90) Tablet with 1 refills  Adjusted on 05/08/2020     o lisinopril 40 mg oral tablet   SIG: take 1 tablet (40 mg) by oral route once daily for 90 days   DISP: (90) Tablet with 1 refills  Adjusted on 05/08/2020     o Medications have been Reconciled  o Transition of Care or Provider Policy  · Instructions  o Patient is taking medications as prescribed and doing well.   o Take all medications as prescribed/directed.  o Patient instructed/educated on their diet and exercise program.  o Patient was educated/instructed on their diagnosis, treatment and medications prior to discharge from the clinic today.  o Patient instructed to seek medical attention urgently for new or worsening symptoms.  o Call the office with any concerns or questions.  o Bring all medicines with their bottles to each office visit.  · Disposition  o Call or Return if symptoms worsen or persist.  o Return Visit Request in/on 6 months +/- 2 days ([id number]).            Electronically Signed by: Oracio Mccoy DO -Author on May 8, 2020 02:51:49 PM

## 2021-05-14 VITALS — HEART RATE: 74 BPM | HEIGHT: 59 IN | BODY MASS INDEX: 46.16 KG/M2 | WEIGHT: 229 LBS | OXYGEN SATURATION: 99 %

## 2021-05-14 VITALS
HEIGHT: 59 IN | WEIGHT: 226 LBS | DIASTOLIC BLOOD PRESSURE: 67 MMHG | HEART RATE: 81 BPM | SYSTOLIC BLOOD PRESSURE: 152 MMHG | OXYGEN SATURATION: 99 % | BODY MASS INDEX: 45.56 KG/M2

## 2021-05-14 VITALS
DIASTOLIC BLOOD PRESSURE: 82 MMHG | SYSTOLIC BLOOD PRESSURE: 160 MMHG | WEIGHT: 235.37 LBS | HEART RATE: 83 BPM | HEIGHT: 59 IN | BODY MASS INDEX: 47.45 KG/M2 | OXYGEN SATURATION: 99 %

## 2021-05-14 VITALS
OXYGEN SATURATION: 99 % | WEIGHT: 231 LBS | HEART RATE: 81 BPM | SYSTOLIC BLOOD PRESSURE: 147 MMHG | BODY MASS INDEX: 46.57 KG/M2 | HEIGHT: 59 IN | DIASTOLIC BLOOD PRESSURE: 61 MMHG

## 2021-05-14 NOTE — PROGRESS NOTES
Progress Note      Patient Name: Loan Nam   Patient ID: [id number]   Sex: Female   YOB: 1955    Primary Care Provider: Willy Kumar DO   Referring Provider: Willy Kumar DO    Visit Date: December 22, 2020    Provider: Willy Kumar DO   Location: [facility]   Location Address: [address] [place] [address], [address]  [place], [place]  [id number]   Location Phone: [phone]          Chief Complaint  · Follow up      History Of Present Illness  Loan Nam is a 65 year old /White female who presents for evaluation and treatment of:      Pt is here for f/u and medications.    Pt states that she stopping taking the Amlodipine 5mg d/t swelling. Patient has not been routinely checking blood pressure but patient blood pressure is again elevated today.    Pt states that she has stopped taking the naproxen 500mg everyday. She only takes if she has occasional pain in her joints. Pt has been taking Tylenol pain more frequently than the other.    Pt has questions about the COVID vaccine. Since has allergies to shellfish, penicillin and sulfur. Patient and I discussed her being higher risk and how it would likely be beneficial. We discussed differences between Pfizer and Moderna vaccine.       Past Medical History  Disease Name Date Onset Notes   Allergic rhinitis --  --    Anemia, Unspecified --  --    Arthritis --  --    Arthritis of Left Knee 07/28/2016 --    Asthma --  --    Bile salt-induced diarrhea --  --    Depression --  --    History of Pulmonary Embolism --  --    Hypertension --  --    Limb Swelling --  --    Moderate episode of recurrent major depressive disorder 02/03/2020 --    Muscle cramp, nocturnal 07/17/2018 --    Reflux --  --    Seasonal allergies --  --    Shortness of Breath --  --    Venous insufficiency of left leg 07/17/2018 She needs to elevated nad wear compression hose. 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         Past  Surgical History  Procedure Name Date Notes   *Other Surgery --  mass removal on neck    Appendectomy 1977 --    C-sections 1977 --    Cesarian Section --  --    Cholecystectomy 1977 --    Colonoscopy --  --    Gallbladder --  --    Hysterectomy 2004 --    Tonsillectomy --  --          Medication List  Name Date Started Instructions   amlodipine 5 mg oral tablet 07/28/2020 take 1 tablet (5 mg) by oral route once daily for 90 days   buspirone 15 mg oral tablet 12/22/2020 take 1 tablet (15 mg) by oral route 2 times per day for 90 days   ergocalciferol (vitamin D2) 1,250 mcg (50,000 unit) oral capsule 09/17/2020 TAKE 1 CAPSULE BY ORAL ROUTE ONCE WEEKLY FOR 12 WEEKS   hydrochlorothiazide 25 mg oral tablet 12/22/2020 take 1 tablet (25 mg) by oral route once daily for 90 days   lisinopril 40 mg oral tablet 12/22/2020 take 1 tablet (40 mg) by oral route once daily for 90 days   naproxen 500 mg oral tablet 07/28/2020 take 1 tablet (500 mg) by oral route 2 times per day with food for 90 days   omeprazole 20 mg oral capsule,delayed release(DR/EC) 12/22/2020 take 1 capsule (20 mg) by oral route once daily before a meal for 90 days   potassium chloride 20 mEq oral tablet extended release 09/25/2020 take 1 tablet (20 meq) by oral route once daily with food for 90 days   Ventolin HFA 90 mcg/actuation inhalation HFA aerosol inhaler 10/25/2019 inhale 1 - 2 puffs (90 - 180 mcg) by inhalation route every 6 hours as needed for 30 days         Allergy List  Allergen Name Date Reaction Notes   Allergy to IVP dye --  --  --    PENICILLINS --  --  --    SULFA (SULFONAMIDES) --  --  --          Family Medical History  Disease Name Relative/Age Notes   Hypertension Father/  Mother/  Sister/   --    Cancer, Unspecified Father/  Mother/   Mother; Father  Mother; Father; Sister  Mother; Father   Blood Diseases Father/   Father   Family history of certain chronic disabling diseases; arthritis Mother/  Sister/   Mother; Sister  Mother; Sister   Mother; Sister; Son   Osteoporosis Mother/   Mother   Family history of Arthritis Mother/  Sister/   Mother; Sister  Mother         Reproductive History  Menstrual   Age Menarche: 14   Pregnancy Summary   Total Pregnancies: 1 Full Term: 1 Premature: 0   Ab Induced: 0 Ab Spontaneous: 0 Ectopics: 0   Multiples: 0 Livin         Social History  Finding Status Start/Stop Quantity Notes   Alcohol Never --/-- --  2020 - 10/15/2018 -    Alcohol Use Never --/-- --  does not drink    --  --/-- --  --    . --  --/-- --  --    lives alone --  --/-- --  --    No known infection risk --  --/-- --  --    Recreational Drug Use Never --/-- --  no   Sedentary --  --/-- --  --    Tobacco Never --/-- --  never smoker   Working --  --/-- --  --          Immunizations  NameDate Admin Mfg Trade Name Lot Number Route Inj VIS Given VIS Publication   Cayfnosjd15/30/2019 SKB Fluarix, quadrivalent, preservative free [id number] NE NE 2019    Comments: caitlyn   Belfvrpcpcqn75/06/2014 MSD PNEUMOVAX 23 [id number] IM RD 2014 10/06/2009   Comments: tolerated well   Tdap2010 SKB BOOSTRIX  NE NE 2015   Comments:          Review of Systems  · Constitutional  o Denies  o : fatigue, night sweats  · Eyes  o Denies  o : double vision, blurred vision  · HENT  o Denies  o : vertigo, recent head injury  · Cardiovascular  o Admits  o : lower extremity edema  o Denies  o : chest pain, irregular heart beats  · Respiratory  o Denies  o : shortness of breath, productive cough  · Gastrointestinal  o Denies  o : nausea, vomiting  · Genitourinary  o Denies  o : dysuria, urinary retention  · Integument  o Denies  o : hair growth change, new skin lesions  · Neurologic  o Denies  o : altered mental status, seizures  · Musculoskeletal  o Admits  o : joint pain, limitation of motion, back pain  o Denies  o : joint swelling  · Endocrine  o Denies  o : cold intolerance, heat  "intolerance  · Psychiatric  o Admits  o : anxiety, depression  o Denies  o : suicidal ideation, homicidal ideation  · Heme-Lymph  o Denies  o : petechiae, lymph node enlargement or tenderness  · Allergic-Immunologic  o Denies  o : frequent illnesses      Vitals  Date Time BP Position Site L\R Cuff Size HR RR TEMP (F) WT  HT  BMI kg/m2 BSA m2 O2 Sat FR L/min FiO2 HC       11/23/2020 08:26 AM      74 - R   229lbs 0oz 4'  11\" 46.25 2.08 99 %      12/22/2020 09:34 /61 Sitting    81 - R   231lbs 0oz 4'  11\" 46.66 2.09 99 %  21%          Physical Examination  · Constitutional  o Appearance  o : alert, oriented, in no acute distress, well developed, well-nourished  · Eyes  o Vision  o : Conjuntivae: Normal, Sclerae white, Pupils: PERRL, Cornea: Clear, no lesions bilateral  · Ears, Nose, Mouth and Throat  o Ears  o : Ext. Ears: Normal shape, Non tender, EACs: Normal , Tragus intact bilaterally, Hearing: intact to conversational voice bilaterally  o Nose  o : No nasal discharge, Mucosa: normal, Septum: midline, Sinuses: Nontender  o Throat  o : Oropharynx: no inflmation or lesions, no purulence or drainage  · Neck  o Inspection/Palpation  o : Supple, no masses or tenderness, no deformities, Trachea: Midline, ROM: with in normal limits, no neck stiffness, no lymphadenopathy  o Thyroid  o : no thyomegaly, no palpabale masses   · Respiratory  o Auscultation of Lungs  o : normal breath sounds throughout, no wheeze, rhonchi, or crackles  · Cardiovascular  o Heart  o : Regular rate and rhythm, Normal S1,S2 , No cardiac murmers, No S3 or S4 gallop or rubs  · Gastrointestinal  o Abdominal Examination  o : abdomen soft, nontender, non distended, no rigidity, gaurding, rebound tenderness, no ventral hernias present  o Liver and spleen  o : no hepatomegaly present, liver nontender to palpation, spleen not palpable  · Skin and Subcutaneous Tissue  o General Inspection  o : no rashes on visible skin, normal skin color, warm and " dry  o Digits and Nails  o : no clubbing, cyanosis, deformities or edema present, normal appearing nails  · Neurologic  o Mental Status Examination  o : alert and oriented to time, place, and person. Gait and Station: normal gait, able to stand without difficulty. CN 2-12 grossly intact   · Psychiatric  o Judgment and Insight  o : judgment and insight intact  o Mood and Affect  o : normal mood and affect          Assessment  · Visit for screening mammogram     V76.12/Z12.31  · Essential hypertension     401.9/I10  Patient blood pressure still elevated. Patient will have Carvedilol BID. Patient recommended to keep blood pressure log.   · Vitamin D deficiency     268.9/E55.9  · Arthritis of Left Knee     715.16/M17.10  · Moderate episode of recurrent major depressive disorder     296.32/F33.1  · Muscle cramp, nocturnal     729.82/R25.2  · Allergic rhinitis     477.9/J30.9  · Anemia, Unspecified     285.9/D64.9  · Hypokalemia     276.8/E87.6      Plan  · Orders  o Screening Mammography; Bilateral 3D (96699, 36329, ) - V76.12/Z12.31 - 12/22/2020   RONN, Fridays evening after 3:00pm  o Vitamin D Level (77614) - 268.9/E55.9 - 12/22/2020  o ACO - Pt declines to or was not able to provide an Advance Care Plan or name a Surrogate Decision Maker (1124F) - - 12/22/2020  o ACO-39: Current medications updated and reviewed (1159F, ) - - 12/22/2020  o ACO-15: Pneumococcal Vaccine Administered or Previously Received [facility] (4040F) - - 12/22/2020  o ACO-14: Influenza immunization administered or previously received [facility] () - - 12/22/2020  o Magnesium, serum (43096) - 276.8/E87.6 - 12/22/2020  · Medications  o carvedilol 3.125 mg oral tablet   SIG: take 1 tablet (3.125 mg) by oral route 2 times per day with food for 90 days   DISP: (180) Tablet with 1 refills  Prescribed on 12/22/2020     o buspirone 15 mg oral tablet   SIG: take 1 tablet (15 mg) by oral route 2 times per day for 90 days   DISP: (180) Tablet with 1  refills  Refilled on 12/22/2020     o hydrochlorothiazide 25 mg oral tablet   SIG: take 1 tablet (25 mg) by oral route once daily for 90 days   DISP: (90) Tablet with 1 refills  Refilled on 12/22/2020     o lisinopril 40 mg oral tablet   SIG: take 1 tablet (40 mg) by oral route once daily for 90 days   DISP: (90) Tablet with 1 refills  Refilled on 12/22/2020     o omeprazole 20 mg oral capsule,delayed release(DR/EC)   SIG: take 1 capsule (20 mg) by oral route once daily before a meal for 90 days   DISP: (90) Capsule with 3 refills  Refilled on 12/22/2020     o doxycycline monohydrate 100 mg oral capsule   SIG: take 1 capsule (100 mg) by oral route 2 times per day for 10 days   DISP: (20) capsules with 0 refills  Discontinued on 12/22/2020     o Medications have been Reconciled  o Transition of Care or Provider Policy  · Instructions  o Patient advised to monitor blood pressure (B/P) at home and journal readings. Patient informed that a B/P reading at home of more than 130/80 is considered hypertension. For readings greater duye022/90 or higher patient is advised to follow up in the office with readings for management. Patient advised to limit sodium intake.  o Take all medications as prescribed/directed.  o Patient was educated/instructed on their diagnosis, treatment and medications prior to discharge from the clinic today.  o Patient instructed to seek medical attention urgently for new or worsening symptoms.  o Call the office with any concerns or questions.  o Bring all medicines with their bottles to each office visit.  o Minutes spent with patient including greater than 50% in Education/Counseling/Care Coordination.  o Time spent with the patient was minutes, more than 50% face to face.  o Chronic conditions reviewed and taken into consideration for today's treatment plan.  o Discussed Covid-19 precautions including, but not limited to, social distancing, avoid touching your face, and hand washing.    · Disposition  o Follow up in three months            Electronically Signed by: Willy Kumar DO -Author on December 22, 2020 10:15:13 AM

## 2021-05-14 NOTE — PROGRESS NOTES
Progress Note      Patient Name: Loan Nam   Patient ID: [id number]   Sex: Female   YOB: 1955    Primary Care Provider: Helio Doran PA-C   Referring Provider: Willy Kumar DO    Visit Date: April 12, 2021    Provider: Helio Doran PA-C   Location: [facility]   Location Address: [address] [place] [address], [address]  [place], [place]  [id number]   Location Phone: [phone]          Chief Complaint  · possible sciatica  · rt lower leg soreness      History Of Present Illness  Loan Nam is a 65 year old /White female who presents for evaluation and treatment of: possible sciatica left leg.      pt presents today for possible sciatica left leg.    pt states on Saturday she started having left hip pain that radiates down her leg. denies any numbness and tingling. pt is having a hard time sitting.    pt states she has been taking Tyenol and doesn't seem to be helping.    pt states her rt lower leg is sore, she believes it is due to the way she was sitting on blechers on Saturday at the [employer].    No specific injury noted.  She denies any urinary or fecal incontinence  Pain 6/10    Pain originates the lower back and radiates down the left leg posterior gluteal region    Essential HTN - fair control - no CP, SOA, HA - coreg 3.125 BID, HCTZ - 25mg, Lisinopril 40mg QD          Past Medical History  Disease Name Date Onset Notes   Allergic rhinitis --  --    Anemia, Unspecified --  --    Arthritis --  --    Arthritis of Left Knee 07/28/2016 --    Asthma --  --    Bile salt-induced diarrhea --  --    Bone Density Screening --  never   Depression --  --    History of Pulmonary Embolism --  --    Hypertension --  --    Limb Swelling --  --    Moderate episode of recurrent major depressive disorder 02/03/2020 --    Muscle cramp, nocturnal 07/17/2018 --    Pap smear for cervical cancer screening 2000 no longer hysterectomy   Reflux --  --    Screening Mammogram 10/2019  normal   Seasonal allergies --  --    Shortness of Breath --  --    Venous insufficiency of left leg 07/17/2018 She needs to elevated nad wear compression hose. 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         Past Surgical History  Procedure Name Date Notes   *Other Surgery --  mass removal on neck    Appendectomy 1977 --    C-sections 1977 --    Cesarian Section --  --    Cholecystectomy 1977 --    Colonoscopy 12/2014 normal   Gallbladder --  --    Hysterectomy 2004 --    Tonsillectomy --  --          Medication List  Name Date Started Instructions   buspirone 15 mg oral tablet 12/22/2020 take 1 tablet (15 mg) by oral route 2 times per day for 90 days   carvedilol 3.125 mg oral tablet 03/04/2021 take 2 tablets by oral route in the morning and 1 tablet in afternoon for 30 days   hydrochlorothiazide 25 mg oral tablet 12/22/2020 take 1 tablet (25 mg) by oral route once daily for 90 days   lisinopril 40 mg oral tablet 12/22/2020 take 1 tablet (40 mg) by oral route once daily for 90 days   omeprazole 20 mg oral capsule,delayed release(DR/EC) 12/22/2020 take 1 capsule (20 mg) by oral route once daily before a meal for 90 days   Ventolin HFA 90 mcg/actuation inhalation HFA aerosol inhaler 10/25/2019 inhale 1 - 2 puffs (90 - 180 mcg) by inhalation route every 6 hours as needed for 30 days         Allergy List  Allergen Name Date Reaction Notes   Allergy to IVP dye --  --  --    PENICILLINS --  --  --    SULFA (SULFONAMIDES) --  --  --        Allergies Reconciled  Family Medical History  Disease Name Relative/Age Notes   Hypertension Father/  Mother/  Sister/   --    Cancer, Unspecified Father/  Mother/   Mother; Father  Mother; Father; Sister  Mother; Father   Blood Diseases Father/   Father   Family history of certain chronic disabling diseases; arthritis Mother/  Sister/   Mother; Sister  Mother; Sister  Mother; Sister; Son   Osteoporosis Mother/   Mother   Family history of Arthritis  "Mother/  Sister/   Mother; Sister  Mother         Reproductive History  Menstrual   Age Menarche: 14   Pregnancy Summary   Total Pregnancies: 1 Full Term: 1 Premature: 0   Ab Induced: 0 Ab Spontaneous: 0 Ectopics: 0   Multiples: 0 Livin         Social History  Finding Status Start/Stop Quantity Notes   Alcohol Never --/-- --  2020 - 10/15/2018 -    Alcohol Use --  --/-- --  2021 - does not drink    --  --/-- --  --    . --  --/-- --  --    lives alone --  --/-- --  --    No known infection risk --  --/-- --  --    Recreational Drug Use Never --/-- --  no   Sedentary --  --/-- --  --    Tobacco Never --/-- --  never smoker   Working --  --/-- --  --          Immunizations  NameDate Admin Mfg Trade Name Lot Number Route Inj VIS Given VIS Publication   Ouhysuzsn32/30/2019 SKB Fluarix, quadrivalent, preservative free [id number] NE NE 2019    Comments: caitlyn   Upnrlqgwzyka67/06/2014 MSD PNEUMOVAX 23 [id number] IM RD 2014 10/06/2009   Comments: tolerated well   Tdap2010 SKB BOOSTRIX  NE NE 2015   Comments:          Review of Systems  · Constitutional  o Denies  o : fever, fatigue, weight loss, weight gain  · Cardiovascular  o Denies  o : lower extremity edema, claudication, chest pressure, palpitations  · Respiratory  o Denies  o : shortness of breath, wheezing, cough, hemoptysis, dyspnea on exertion  · Gastrointestinal  o Denies  o : nausea, vomiting, diarrhea, constipation, abdominal pain      Vitals  Date Time BP Position Site L\R Cuff Size HR RR TEMP (F) WT  HT  BMI kg/m2 BSA m2 O2 Sat FR L/min FiO2 HC       2021 11:47 /67 Sitting    81 - R   226lbs 0oz 4'  11\" 45.65 2.07 99 %      2021 11:49 /77 Sitting                       Physical Examination  · Constitutional  o Appearance  o : overweight, well developed  · Head and Face  o Head  o : normocephalic, atraumatic  · Neck  o Inspection/Palpation  o : normal appearance, no masses " or tenderness, trachea midline  o Thyroid  o : gland size normal, nontender, no nodules or masses present on palpation  · Respiratory  o Respiratory Effort  o : breathing unlabored  o Inspection of Chest  o : chest rise symmetric bilaterally  o Auscultation of Lungs  o : clear to auscultation bilaterally throughout inspiration and expiration  · Cardiovascular  o Heart  o :   § Auscultation of Heart  § : regular rate and rhythm, no murmurs, gallops or rubs  o Peripheral Vascular System  o :   § Extremities  § : no edema     BACK - palp tenderness in the lower back, into the left gluteal region, neg SLR, no weakness           Assessment  · Lumbago     724.2/M54.5  · Class 3 severe obesity due to excess calories with serious comorbidity and body mass index (BMI) of 45.0 to 49.9 in adult       Morbid (severe) obesity due to excess calories     278.01/E66.01  Body mass index [BMI] 45.0-49.9, adult     278.01/Z68.42  · Screening for depression     V79.0/Z13.89  · Sciatica of left side     724.3/M54.32  · Low back pain     724.2/M54.5  · Renal insufficiency     593.9/N28.9      Plan  · Orders  o Lumbar Spine Complete [facility] (07634) - 724.2/M54.5 - 04/12/2021  o ACO-18: Negative screen for clinical depression using a standardized tool () - V79.0/Z13.89 - 04/12/2021  o ACO-18: Negative screen for clinical depression using a standardized tool () - - 04/12/2021  o ACO-39: Current medications updated and reviewed (1159F, ) - - 04/12/2021  · Medications  o Medrol (Andrew) 4 mg oral tablets,dose pack   SIG: take by oral route as directed per package instructions   DISP: (1) Packet with 0 refills  Prescribed on 04/12/2021     o cyclobenzaprine 10 mg oral tablet   SIG: take 1 tablet (10 mg) by oral route 3 times per day PRN Muscle spasms   DISP: (60) Tablet with 0 refills  Prescribed on 04/12/2021     o naproxen 500 mg oral tablet   SIG: take 1 tablet (500 mg) by oral route once daily as needed   DISP: (90) Tablet with 1  refills  Discontinued on 04/12/2021     o Medications have been Reconciled  o Transition of Care or Provider Policy  · Instructions  o Depression Screen completed and scanned into the EMR under the designated folder within the patient's documents.  o Today's PHQ-9 result is _3__  o Take all medications as prescribed/directed.  o Patient instructed/educated on their diet and exercise program.  o Patient was educated/instructed on their diagnosis, treatment and medications prior to discharge from the clinic today.  o Patient counseled to reduce calorie intake.  o Patient was instructed to exercise regularly.  o Discussed Covid-19 precautions including, but not limited to, social distancing, avoid touching your face, and hand washing.   · Disposition  o Call or Return if symptoms worsen or persist.  o F/U 2 weeks  o Care Transition            Electronically Signed by: Helio Doran PA-C -Author on April 12, 2021 07:36:02 PM

## 2021-05-14 NOTE — PROGRESS NOTES
Progress Note      Patient Name: Loan Nam   Patient ID: [id number]   Sex: Female   YOB: 1955    Primary Care Provider: Willy Kumar DO   Referring Provider: Willy Kumar DO    Visit Date: February 12, 2021    Provider: ARIN Martinez   Location: [facility]   Location Address: [address] [place] [address], [address]  [place], [place]  [id number]   Location Phone: [phone]          Chief Complaint  · MED REFILLS      History Of Present Illness  Loan Nam is a 65 year old /White female who presents for evaluation and treatment of:      Patient is here today for medication refills.    States Dr. Kumar told her to stop taking Naproxen. Says she have been having a lot of arthritis pain and only have been taking Ibuprofen OTC. States the naproxen was written bid but she only took it as needed. Reviewed labs from Sept-her Cr was sl elevated at .96 but GFR WNL. Instructed to use prn-refilled today.     c/o knee pain-states she sees  and he has done injections in her knee-last inj was in her Knee. States she has pain in her heel and back. She uses [facility]pas for knee and back pain. She wears knee and back braces as needed or ace bandage. Reports they help the pain. She stands for 10.5 hours day at [employer] and does not want to take anything that makes her drowsy. Kenalog 60mg IM administered in the office today.    hx of hypokalemia-last potassium was 2.8 in Sept she did not have it rechecked. Ordered repeat CMP today.     states in July she had vomiting/diarrhea for 2 wks-she called her PCP not this office-she was placed off diarrhea x 4 days-she asked to be tested for COVID but he would not check her. She called again to be seen but he would not see her and told her to go to work-she went to work and they sent her home she went again the next day and they sent her home again and got another point. States she has been at work for 25 yrs and has never had  attendance problems until now. She is requesting Intermittent paperwork to be filled out for doctors appt so she does not get any more points-they do take her vacation days when she is off work. She is to drop the paperwork off. She would like it mailed back.     HTN- stopped her Norvasc due to swelling. States she has been eating high sodium-antonio-and just took her medication before coming today. Her b/p is elevated in the office today-encouraged pt to decreased sodium. She is to keep a log of her b/p and drop of for my review in 1-2 wks.     Colonoscopy 2014  Mammogram scheduled 04/2021  Pap smear no longer get hysterectomy 2000  Flu Shot Fall 2020    mother has RA-she does not want to be checked at this time.          Past Medical History  Disease Name Date Onset Notes   Allergic rhinitis --  --    Anemia, Unspecified --  --    Arthritis --  --    Arthritis of Left Knee 07/28/2016 --    Asthma --  --    Bile salt-induced diarrhea --  --    Bone Density Screening --  never   Depression --  --    History of Pulmonary Embolism --  --    Hypertension --  --    Limb Swelling --  --    Moderate episode of recurrent major depressive disorder 02/03/2020 --    Muscle cramp, nocturnal 07/17/2018 --    Pap smear for cervical cancer screening 2000 no longer hysterectomy   Reflux --  --    Screening Mammogram 10/2019 normal   Seasonal allergies --  --    Shortness of Breath --  --    Venous insufficiency of left leg 07/17/2018 She needs to elevated nad wear compression hose. 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         Past Surgical History  Procedure Name Date Notes   *Other Surgery --  mass removal on neck    Appendectomy 1977 --    C-sections 1977 --    Cesarian Section --  --    Cholecystectomy 1977 --    Colonoscopy 12/2014 normal   Gallbladder --  --    Hysterectomy 2004 --    Tonsillectomy --  --          Medication List  Name Date Started Instructions   buspirone 15 mg oral tablet  2020 take 1 tablet (15 mg) by oral route 2 times per day for 90 days   carvedilol 3.125 mg oral tablet 2020 take 1 tablet (3.125 mg) by oral route 2 times per day with food for 90 days   hydrochlorothiazide 25 mg oral tablet 2020 take 1 tablet (25 mg) by oral route once daily for 90 days   lisinopril 40 mg oral tablet 2020 take 1 tablet (40 mg) by oral route once daily for 90 days   naproxen 500 mg oral tablet 2021 take 1 tablet (500 mg) by oral route once daily as needed   omeprazole 20 mg oral capsule,delayed release(DR/EC) 2020 take 1 capsule (20 mg) by oral route once daily before a meal for 90 days   Ventolin HFA 90 mcg/actuation inhalation HFA aerosol inhaler 10/25/2019 inhale 1 - 2 puffs (90 - 180 mcg) by inhalation route every 6 hours as needed for 30 days         Allergy List  Allergen Name Date Reaction Notes   Allergy to IVP dye --  --  --    PENICILLINS --  --  --    SULFA (SULFONAMIDES) --  --  --          Family Medical History  Disease Name Relative/Age Notes   Hypertension Father/  Mother/  Sister/   --    Cancer, Unspecified Father/  Mother/   Mother; Father  Mother; Father; Sister  Mother; Father   Blood Diseases Father/   Father   Family history of certain chronic disabling diseases; arthritis Mother/  Sister/   Mother; Sister  Mother; Sister  Mother; Sister; Son   Osteoporosis Mother/   Mother   Family history of Arthritis Mother/  Sister/   Mother; Sister  Mother         Reproductive History  Menstrual   Age Menarche: 14   Pregnancy Summary   Total Pregnancies: 1 Full Term: 1 Premature: 0   Ab Induced: 0 Ab Spontaneous: 0 Ectopics: 0   Multiples: 0 Livin         Social History  Finding Status Start/Stop Quantity Notes   Alcohol Never --/-- --  2020 - 10/15/2018 -    Alcohol Use --  --/-- --  2021 - does not drink    --  --/-- --  --    . --  --/-- --  --    lives alone --  --/-- --  --    No known infection risk --  --/--  "--  --    Recreational Drug Use Never --/-- --  no   Sedentary --  --/-- --  --    Tobacco Never --/-- --  never smoker   Working --  --/-- --  --          Immunizations  NameDate Admin Mfg Trade Name Lot Number Route Inj VIS Given VIS Publication   [id number]/30/2019 SKB Fluarix, quadrivalent, preservative free [id number] NE NE 09/30/2019    Comments: carinaeens   Woqhxssmsukr62/06/2014 MSD PNEUMOVAX 23 [id number] IM RD 06/06/2014 10/06/2009   Comments: tolerated well   Tdap06/01/2010 SKB BOOSTRIX  NE NE 08/17/2015 01/24/2012   Comments:          Review of Systems  · Constitutional  o Denies  o : fatigue, night sweats  · Eyes  o Denies  o : double vision, blurred vision  · HENT  o Denies  o : vertigo, recent head injury  · Breasts  o Denies  o : abnormal changes in breast size, additional breast symptoms except as noted in the HPI  · Cardiovascular  o Denies  o : chest pain, irregular heart beats  · Respiratory  o Denies  o : shortness of breath, productive cough  · Gastrointestinal  o Denies  o : nausea, vomiting  · Genitourinary  o Denies  o : dysuria, urinary retention  · Integument  o Denies  o : hair growth change, new skin lesions  · Neurologic  o Denies  o : altered mental status, seizures  · Musculoskeletal  o Admits  o : joint pain, joint swelling, back pain, knee pain, foot pain  o Denies  o : limitation of motion  · Endocrine  o Denies  o : cold intolerance, heat intolerance  · Psychiatric  o Admits  o : anxiety, depression  · Heme-Lymph  o Denies  o : petechiae, lymph node enlargement or tenderness  · Allergic-Immunologic  o Denies  o : frequent illnesses      Vitals  Date Time BP Position Site L\R Cuff Size HR RR TEMP (F) WT  HT  BMI kg/m2 BSA m2 O2 Sat FR L/min FiO2 HC       12/22/2020 09:34 /61 Sitting    81 - R   231lbs 0oz 4'  11\" 46.66 2.09 99 %  21%    02/12/2021 09:39 /82 Sitting    83 - R   235lbs 6oz 4'  11\" 47.54 2.11 99 %            Physical " Examination  · Constitutional  o Appearance  o : alert, in no acute distress, well developed, well-nourished  · Neck  o Thyroid  o : no thyomegaly, no palpabale masses   · Respiratory  o Auscultation of Lungs  o : normal breath sounds throughout, no wheeze, rhonchi, or crackles  · Cardiovascular  o Heart  o : Regular rate and rhythm, Normal S1,S2 , No cardiac murmers, No S3 or S4 gallop or rubs  · Skin and Subcutaneous Tissue  o General Inspection  o : no rashes, normal skin color, warm and dry  o Digits and Nails  o : no clubbing, cyanosis, deformities or edema present, normal appearing nails  · Neurologic  o Mental Status Examination  o : alert and oriented to time, place, and person. Gait and Station: normal gait, able to stand without difficulty  · Psychiatric  o Judgement and Insight  o : judgment and insight intact  o Mood and Affect  o : normal mood and affect          Assessment  · Anxiety disorder     300.00/F41.9  · Asthma     493.90/J45.909  · Depression     311/F32.9  · Essential hypertension     401.9/I10  · GERD (gastroesophageal reflux disease)     530.81/K21.9  · Screening for depression     V79.0/Z13.89  · Screening for lipid disorders     V77.91/Z13.220  · Visit for screening mammogram     V76.12/Z12.31  · Arthritis of Left Knee     715.16/M17.10  · History of Pulmonary Embolism     415.19  · Hypertension     401.9/I10  · Reflux     530.81/K21.9  · Seasonal allergies     477.9/J30.2  · Hypokalemia     276.8/E87.6  · Screening for thyroid disorder     V77.0/Z13.29  · Arthritis     716.90/M19.90  · Joint pain     719.40/M25.50      Plan  · Orders  o Screening Mammography; Bilateral 3D (43529, 48717, ) - V76.12/Z12.31 - 04/30/2021 04/30/21 RONN   o Physical, Primary Care Panel (CBC, CMP, Lipid, TSH) [facility] (42369, 44010, 23557, 71700) - 401.9/I10, 276.8/E87.6, V77.0/Z13.29, V77.91/Z13.220 - 02/12/2021  o IM - Injection Fee [facility] (77184) - 716.90/M19.90, 719.40/M25.50 - 02/12/2021  o ACO-14: Influenza  immunization administered or previously received [facility] () - - 02/12/2021  o ACO-19: Colorectal cancer screening results documented and reviewed (3017F) - - 12/15/2014  o ACO-39: Current medications updated and reviewed (, 1159F) - - 02/12/2021  o 6.00 - Kenalog Injection 60mg (-0) - 716.90/M19.90, 719.40/M25.50 - 02/12/2021   Injection - Kenalog 60 mg; Dose: 60 mg; Site: Right Gluteus; Route: intramuscular; Date: 02/12/2021 12:07:22; Exp: 01/01/2022; Lot: [id number]; Mfg: [employer]; TradeName: triamcinolone; [place] [facility] [place]; Administered By: Charla Van MA; Comment: PATIENT TOLERATED WELL.  · Medications  o naproxen 500 mg oral tablet   SIG: take 1 tablet (500 mg) by oral route once daily as needed   DISP: (90) Tablet with 1 refills  Adjusted on 02/12/2021     o amlodipine 5 mg oral tablet   SIG: take 1 tablet (5 mg) by oral route once daily for 90 days   DISP: (90) tablets with 1 refills  Discontinued on 02/12/2021     o Medications have been Reconciled  o Transition of Care or Provider Policy  · Instructions  o Depression Screen completed and scanned into the EMR under the designated folder within the patient's documents.  o Today's PHQ-9 result is _0__  o Take all medications as prescribed/directed.  o Patient was educated/instructed on their diagnosis, treatment and medications prior to discharge from the clinic today.  o 6 month follow up  o Electronically Identified Patient Education Materials Provided Electronically  · Disposition  o Call or Return if symptoms worsen or persist.            Electronically Signed by: ARIN Martinez -Author on February 12, 2021 01:28:23 PM

## 2021-05-15 VITALS
HEIGHT: 59 IN | SYSTOLIC BLOOD PRESSURE: 154 MMHG | DIASTOLIC BLOOD PRESSURE: 68 MMHG | WEIGHT: 221 LBS | BODY MASS INDEX: 44.55 KG/M2 | HEART RATE: 74 BPM

## 2021-05-15 VITALS — WEIGHT: 227.25 LBS | OXYGEN SATURATION: 97 % | HEART RATE: 71 BPM | BODY MASS INDEX: 45.81 KG/M2 | HEIGHT: 59 IN

## 2021-05-15 VITALS — SYSTOLIC BLOOD PRESSURE: 130 MMHG | HEART RATE: 64 BPM | DIASTOLIC BLOOD PRESSURE: 70 MMHG

## 2021-05-15 VITALS
WEIGHT: 230.12 LBS | DIASTOLIC BLOOD PRESSURE: 75 MMHG | OXYGEN SATURATION: 100 % | HEIGHT: 59 IN | SYSTOLIC BLOOD PRESSURE: 132 MMHG | HEART RATE: 77 BPM | BODY MASS INDEX: 46.39 KG/M2

## 2021-05-15 VITALS
TEMPERATURE: 97.9 F | HEIGHT: 59 IN | WEIGHT: 227.31 LBS | DIASTOLIC BLOOD PRESSURE: 69 MMHG | OXYGEN SATURATION: 100 % | SYSTOLIC BLOOD PRESSURE: 161 MMHG | HEART RATE: 68 BPM | DIASTOLIC BLOOD PRESSURE: 76 MMHG | BODY MASS INDEX: 45.83 KG/M2 | SYSTOLIC BLOOD PRESSURE: 138 MMHG

## 2021-05-15 VITALS
SYSTOLIC BLOOD PRESSURE: 130 MMHG | OXYGEN SATURATION: 98 % | DIASTOLIC BLOOD PRESSURE: 61 MMHG | HEIGHT: 59 IN | TEMPERATURE: 97.9 F | BODY MASS INDEX: 45.03 KG/M2 | WEIGHT: 223.37 LBS | HEART RATE: 62 BPM

## 2021-05-15 VITALS — HEART RATE: 69 BPM | OXYGEN SATURATION: 97 % | HEIGHT: 59 IN | WEIGHT: 242 LBS | BODY MASS INDEX: 48.79 KG/M2

## 2021-05-15 VITALS
HEIGHT: 59 IN | WEIGHT: 226.5 LBS | HEART RATE: 76 BPM | BODY MASS INDEX: 45.66 KG/M2 | OXYGEN SATURATION: 99 % | DIASTOLIC BLOOD PRESSURE: 62 MMHG | SYSTOLIC BLOOD PRESSURE: 139 MMHG

## 2021-05-15 VITALS
SYSTOLIC BLOOD PRESSURE: 131 MMHG | HEIGHT: 59 IN | WEIGHT: 226.5 LBS | BODY MASS INDEX: 45.66 KG/M2 | DIASTOLIC BLOOD PRESSURE: 49 MMHG | HEART RATE: 72 BPM | OXYGEN SATURATION: 99 % | TEMPERATURE: 99.4 F

## 2021-05-15 VITALS
OXYGEN SATURATION: 100 % | DIASTOLIC BLOOD PRESSURE: 63 MMHG | WEIGHT: 231 LBS | HEIGHT: 59 IN | BODY MASS INDEX: 46.57 KG/M2 | SYSTOLIC BLOOD PRESSURE: 146 MMHG | HEART RATE: 78 BPM

## 2021-05-16 VITALS
BODY MASS INDEX: 46.04 KG/M2 | WEIGHT: 228.37 LBS | HEART RATE: 94 BPM | SYSTOLIC BLOOD PRESSURE: 169 MMHG | DIASTOLIC BLOOD PRESSURE: 86 MMHG | OXYGEN SATURATION: 100 % | HEIGHT: 59 IN

## 2021-05-16 VITALS
SYSTOLIC BLOOD PRESSURE: 134 MMHG | WEIGHT: 215 LBS | HEART RATE: 80 BPM | HEIGHT: 59 IN | BODY MASS INDEX: 43.34 KG/M2 | DIASTOLIC BLOOD PRESSURE: 73 MMHG

## 2021-05-16 VITALS
HEART RATE: 73 BPM | TEMPERATURE: 97.9 F | HEIGHT: 59 IN | BODY MASS INDEX: 45.16 KG/M2 | SYSTOLIC BLOOD PRESSURE: 166 MMHG | DIASTOLIC BLOOD PRESSURE: 78 MMHG | WEIGHT: 224 LBS | OXYGEN SATURATION: 100 %

## 2021-05-16 VITALS
DIASTOLIC BLOOD PRESSURE: 61 MMHG | BODY MASS INDEX: 44.15 KG/M2 | HEIGHT: 59 IN | HEART RATE: 82 BPM | WEIGHT: 219 LBS | SYSTOLIC BLOOD PRESSURE: 130 MMHG

## 2021-05-16 VITALS — HEIGHT: 59 IN | HEART RATE: 84 BPM | BODY MASS INDEX: 43.55 KG/M2 | WEIGHT: 216 LBS | OXYGEN SATURATION: 97 %

## 2021-05-16 VITALS
HEART RATE: 98 BPM | BODY MASS INDEX: 43.95 KG/M2 | HEIGHT: 59 IN | WEIGHT: 218 LBS | SYSTOLIC BLOOD PRESSURE: 120 MMHG | DIASTOLIC BLOOD PRESSURE: 59 MMHG

## 2021-05-16 VITALS — HEART RATE: 95 BPM | BODY MASS INDEX: 45.59 KG/M2 | OXYGEN SATURATION: 96 % | HEIGHT: 59 IN | WEIGHT: 226.12 LBS

## 2021-05-16 VITALS
RESPIRATION RATE: 12 BRPM | SYSTOLIC BLOOD PRESSURE: 137 MMHG | HEART RATE: 82 BPM | DIASTOLIC BLOOD PRESSURE: 64 MMHG | WEIGHT: 229 LBS | BODY MASS INDEX: 46.16 KG/M2 | TEMPERATURE: 97.7 F | HEIGHT: 59 IN | OXYGEN SATURATION: 98 %

## 2021-05-16 VITALS
OXYGEN SATURATION: 99 % | WEIGHT: 228 LBS | HEIGHT: 59 IN | DIASTOLIC BLOOD PRESSURE: 79 MMHG | BODY MASS INDEX: 45.96 KG/M2 | SYSTOLIC BLOOD PRESSURE: 145 MMHG | HEART RATE: 93 BPM | TEMPERATURE: 98.1 F

## 2021-06-05 NOTE — PROGRESS NOTES
Progress Note      Patient Name: Loan Nam   Patient ID: [id number]   Sex: Female   YOB: 1955    Primary Care Provider: Helio Doran PA-C   Referring Provider: Helio Doran PA-C    Visit Date: May 7, 2021    Provider: Mike Mckinney MD   Location: [facility]   Location Address: [address] [place] [address]  [id number]   Location Phone: [phone]          Chief Complaint  · Bilateral Knee Pain      History Of Present Illness  Loan Nam is a 65 year old /White female who presents today to [facility].      Patient presents today for a follow-up of bilateral knees. Patient has a history of bilateral knee osteoarthritis that has been treated conservatively. Patient gets periodic injections that do provide her with relief of pain. Patient is requesting bilateral knee injections today.       Past Medical History  Allergic rhinitis; Anemia, Unspecified; Arthritis; Arthritis of Left Knee; Asthma; Bile salt-induced diarrhea; Bone Density Screening; Depression; History of Pulmonary Embolism; Hypertension; Limb Swelling; Moderate episode of recurrent major depressive disorder; Muscle cramp, nocturnal; Pap smear for cervical cancer screening; Reflux; Renal insufficiency; Screening Mammogram; Seasonal allergies; Shortness of Breath; Venous insufficiency of left leg         Past Surgical History  *Other Surgery; Appendectomy; C-sections; Cesarian Section; Cholecystectomy; Colonoscopy; Gallbladder; Hysterectomy; Tonsillectomy         Medication List  buspirone 15 mg oral tablet; carvedilol 3.125 mg oral tablet; cyclobenzaprine 10 mg oral tablet; hydrochlorothiazide 25 mg oral tablet; lisinopril 40 mg oral tablet; Medrol (Andrew) 4 mg oral tablets,dose pack; omeprazole 20 mg oral capsule,delayed release(DR/EC); Ventolin HFA 90 mcg/actuation inhalation HFA aerosol inhaler         Allergy List  Allergy to IVP dye; PENICILLINS; SULFA (SULFONAMIDES)         [facility]  "History  Hypertension; Cancer, Unspecified; Blood Diseases; Family history of certain chronic disabling diseases; arthritis; Osteoporosis; Family history of Arthritis         Reproductive History   1 Para 1 0 0 1       Social History  Alcohol (Never); Alcohol Use; ; .; lives alone; No known infection risk; Recreational Drug Use (Never); Sedentary; Tobacco (Never); Working         Immunizations  Name Date Admin   Influenza 2019   Influenza 2014   Pneumococcal 2014   Tdap 2010         Review of Systems  · Constitutional  o Denies  o : fever, chills, weight loss  · Cardiovascular  o Denies  o : chest pain, shortness of breath  · Gastrointestinal  o Denies  o : liver disease, heartburn, nausea, blood in stools  · Genitourinary  o Denies  o : painful urination, blood in urine  · Integument  o Denies  o : rash, itching  · Neurologic  o Denies  o : headache, weakness, loss of consciousness  · Musculoskeletal  o Denies  o : painful, swollen joints  · Psychiatric  o Denies  o : drug/alcohol addiction, anxiety, depression      Vitals  Date Time BP Position Site L\R Cuff Size HR RR TEMP (F) WT  HT  BMI kg/m2 BSA m2 O2 Sat FR L/min FiO2 HC       2021 04:18 PM         226lbs 0oz 4'  11\" 45.65 2.07             Physical Examination  · Constitutional  o Appearance  o : well developed, well-nourished, no obvious deformities present  · Head and Face  o Head  o :   § Inspection  § : normocephalic  o Face  o :   § Inspection  § : no facial lesions  · Eyes  o Conjunctivae  o : conjunctivae normal  o Sclerae  o : sclerae white  · Ears, Nose, Mouth and Throat  o Ears  o :   § External Ears  § : appearance within normal limits  § Hearing  § : intact  o Nose  o :   § External Nose  § : appearance normal  · Neck  o Inspection/Palpation  o : normal appearance  o Range of Motion  o : full range of motion  · Respiratory  o Respiratory Effort  o : breathing unlabored  o Inspection of " Chest  o : normal appearance  o Auscultation of Lungs  o : no audible wheezing or rales  · Cardiovascular  o Heart  o : regular rate  · Gastrointestinal  o Abdominal Examination  o : soft and non-tender  · Skin and Subcutaneous Tissue  o General Inspection  o : intact, no rashes  · Psychiatric  o General  o : Alert and oriented x3  o Judgement and Insight  o : judgment and insight intact  o Mood and Affect  o : mood normal, affect appropriate  · Extremities  o Extremities  o : BILATERAL KNEE: No skin discoloration, atrophy or swelling. Full extension. Full flexion. Mild tenderness on medial and lateral joint line. Calf supple, nontender. Neurovascular and sensation grossly intact. Negative Lachman. Negative posterior sag. Stable to valgus/varus stress. Good strength in quadriceps, hamstrings, dorsiflexors, and plantar flexors. Positive pulses.  · Injection Note/Aspiration Note  o Site  o : bilateral knees  o Procedure  o : Procedure: After educating the patient, patient gave consent for procedure. After using Chloraprep, the joint space was injected. The patient tolerated the procedure well.  o Medication  o : 80 mg of DepoMedrol with 9cc of 1% Lidocaine  · In Office Procedures  o View  o : LAT/SUNRISE/STANDING   o Site  o : bilateral, knee  o Indication  o : Bilateral knee pain  o Study  o : X-rays ordered, taken in the office, and reviewed today.  o Xray  o : No acute fracture or dislocation.           Assessment  · Pain in both knees, unspecified chronicity       Pain in right knee     719.46/M25.561  Pain in left knee     719.46/M25.562      Plan  · Orders  o 2 - Depo-Medrol injection 80mg () - - 05/07/2021   Lot [id number] 10 2021 [employer] Administered by JAY Mckinney MD  o 2 - Knee Intra-articular Injection without US Guidance [facility] (46515) - - 05/07/2021   Lot [id number] Exp 12 2022 [employer] Administered by JAY Mckinney MD  · Medications  o Medications have been Reconciled  o Transition of Care or Provider  Policy  · Instructions  o Dr. Mciknney saw and examined the patient and agrees with plan.   o X-rays reviewed by Dr. Mckinney.  o Reviewed the patient's Past Medical, Social, and Family history as well as the ROS at today's visit, no changes.  o Call or return if worsening symptoms.  o Follow Up PRN.  o Discussed treatment plans and diagnosis with the patient. Patient received bilateral knee injections and tolerated this procedure well.   o The above service was scribed by Bailey Patiño on my behalf and I attest to the accuracy of the note. bernard            Electronically Signed by: Bela Noguera MA -Author on May 24, 2021 04:09:37 PM  Electronically Co-signed by: Mike Mckinney MD -Reviewer on May 26, 2021 10:00:54 PM

## 2021-07-15 VITALS — WEIGHT: 226 LBS | HEIGHT: 59 IN | BODY MASS INDEX: 45.56 KG/M2

## 2021-09-20 ENCOUNTER — TELEPHONE (OUTPATIENT)
Dept: FAMILY MEDICINE CLINIC | Facility: CLINIC | Age: 66
End: 2021-09-20

## 2021-09-20 NOTE — TELEPHONE ENCOUNTER
Caller: Loan Nam    Relationship to patient: Self    Best call back number: [phone]    Patient is needing: PATIENT CALLED IN AND WOULD LIKE SOME ADVICE ON IF SHE SHOULD GET THE 3RD SHOT FOR COVID. SHE HAS AN APPOINTMENT TO GET THE BOOSTER ON Friday. SHE WOULD LIKE TO KNOW IF ARIN HINKLE SUGGESTS SHE GET THE BOOSTER. PLEASE CALL PATIENT AND ADVISE. SHE SAYS IT IS OK TO LEAVE A MESSAGE ON MACHINE.

## 2021-09-21 NOTE — TELEPHONE ENCOUNTER
Phoned patient left information that Kathie provided about booster approved for over 65 advised to call the office if any questions.

## 2021-10-11 NOTE — TELEPHONE ENCOUNTER
Caller: Loan Nam    Relationship: Self      Medication requested (name and dosage):   carvedilol (COREG) 3.125 MG tablet    Pharmacy where request should be sent:   [facility] - [place], [place] [address] - [phone] [facility] [phone]     Best call back number: [phone]     Does the patient have less than a 3 day supply:  [] Yes  [x] No    Devora Glover Rep   10/11/21 13:40 EDT

## 2021-10-12 RX ORDER — CARVEDILOL 3.12 MG/1
3.12 TABLET ORAL 2 TIMES DAILY WITH MEALS
Qty: 60 TABLET | Refills: 0 | Status: SHIPPED | OUTPATIENT
Start: 2021-10-12 | End: 2021-11-01 | Stop reason: SDUPTHER

## 2021-11-01 ENCOUNTER — OFFICE VISIT (OUTPATIENT)
Dept: FAMILY MEDICINE CLINIC | Facility: CLINIC | Age: 66
End: 2021-11-01

## 2021-11-01 VITALS — HEIGHT: 59 IN | WEIGHT: 228 LBS | BODY MASS INDEX: 45.96 KG/M2

## 2021-11-01 DIAGNOSIS — F32.A DEPRESSION, UNSPECIFIED DEPRESSION TYPE: ICD-10-CM

## 2021-11-01 DIAGNOSIS — M17.0 OSTEOARTHRITIS OF BOTH KNEES, UNSPECIFIED OSTEOARTHRITIS TYPE: ICD-10-CM

## 2021-11-01 DIAGNOSIS — K21.9 GASTROESOPHAGEAL REFLUX DISEASE, UNSPECIFIED WHETHER ESOPHAGITIS PRESENT: ICD-10-CM

## 2021-11-01 DIAGNOSIS — I10 PRIMARY HYPERTENSION: Primary | ICD-10-CM

## 2021-11-01 DIAGNOSIS — Z13.29 THYROID DISORDER SCREEN: ICD-10-CM

## 2021-11-01 PROBLEM — K52.9 DYSPEPTIC DIARRHEA: Status: ACTIVE | Noted: 2021-11-01

## 2021-11-01 PROBLEM — M79.89 LIMB SWELLING: Status: ACTIVE | Noted: 2021-11-01

## 2021-11-01 PROBLEM — J45.909 ASTHMA: Status: ACTIVE | Noted: 2021-11-01

## 2021-11-01 PROBLEM — Z12.4 PAP SMEAR FOR CERVICAL CANCER SCREENING: Status: ACTIVE | Noted: 2021-11-01

## 2021-11-01 PROBLEM — D64.9 ANEMIA: Status: ACTIVE | Noted: 2021-11-01

## 2021-11-01 PROBLEM — M19.90 ARTHRITIS: Status: ACTIVE | Noted: 2021-11-01

## 2021-11-01 PROBLEM — F33.1 MODERATE EPISODE OF RECURRENT MAJOR DEPRESSIVE DISORDER (HCC): Status: ACTIVE | Noted: 2020-02-03

## 2021-11-01 PROBLEM — N28.9 RENAL INSUFFICIENCY: Status: ACTIVE | Noted: 2021-04-12

## 2021-11-01 PROBLEM — R25.2 MUSCLE CRAMP, NOCTURNAL: Status: ACTIVE | Noted: 2018-07-17

## 2021-11-01 PROBLEM — R06.02 SHORTNESS OF BREATH: Status: ACTIVE | Noted: 2021-11-01

## 2021-11-01 PROBLEM — J30.9 ALLERGIC RHINITIS: Status: ACTIVE | Noted: 2021-11-01

## 2021-11-01 PROBLEM — Z86.711 HISTORY OF PULMONARY EMBOLISM: Status: ACTIVE | Noted: 2021-11-01

## 2021-11-01 PROBLEM — R93.7 ABNORMAL BONE DENSITY SCREENING: Status: ACTIVE | Noted: 2021-11-01

## 2021-11-01 PROBLEM — I87.2 VENOUS INSUFFICIENCY OF LEFT LEG: Status: ACTIVE | Noted: 2018-07-17

## 2021-11-01 PROCEDURE — 99214 OFFICE O/P EST MOD 30 MIN: CPT | Performed by: NURSE PRACTITIONER

## 2021-11-01 RX ORDER — OMEPRAZOLE 20 MG/1
CAPSULE, DELAYED RELEASE ORAL
COMMUNITY
Start: 2021-10-18 | End: 2021-11-01 | Stop reason: SDUPTHER

## 2021-11-01 RX ORDER — HYDROCHLOROTHIAZIDE 25 MG/1
25 TABLET ORAL DAILY
Qty: 90 TABLET | Refills: 1 | Status: SHIPPED | OUTPATIENT
Start: 2021-11-01 | End: 2022-05-04 | Stop reason: SDUPTHER

## 2021-11-01 RX ORDER — BUSPIRONE HYDROCHLORIDE 15 MG/1
TABLET ORAL
COMMUNITY
Start: 2021-10-18 | End: 2021-11-01 | Stop reason: SDUPTHER

## 2021-11-01 RX ORDER — HYOSCYAMINE SULFATE 0.125 MG
TABLET ORAL
COMMUNITY
End: 2022-11-08

## 2021-11-01 RX ORDER — CARVEDILOL 3.12 MG/1
3.12 TABLET ORAL 2 TIMES DAILY WITH MEALS
Qty: 180 TABLET | Refills: 1 | Status: SHIPPED | OUTPATIENT
Start: 2021-11-01 | End: 2022-05-04 | Stop reason: SDUPTHER

## 2021-11-01 RX ORDER — OMEPRAZOLE 20 MG/1
20 CAPSULE, DELAYED RELEASE ORAL DAILY
Qty: 90 CAPSULE | Refills: 1 | Status: SHIPPED | OUTPATIENT
Start: 2021-11-01 | End: 2022-05-04 | Stop reason: SDUPTHER

## 2021-11-01 RX ORDER — BUSPIRONE HYDROCHLORIDE 15 MG/1
15 TABLET ORAL 2 TIMES DAILY
Qty: 180 TABLET | Refills: 0 | Status: SHIPPED | OUTPATIENT
Start: 2021-11-01 | End: 2022-05-04 | Stop reason: SDUPTHER

## 2021-11-01 RX ORDER — LISINOPRIL 40 MG/1
40 TABLET ORAL DAILY
Qty: 90 TABLET | Refills: 1 | Status: SHIPPED | OUTPATIENT
Start: 2021-11-01 | End: 2022-05-04 | Stop reason: SDUPTHER

## 2021-11-01 RX ORDER — NAPROXEN 500 MG/1
500 TABLET ORAL
COMMUNITY
End: 2022-06-14

## 2021-11-01 NOTE — PROGRESS NOTES
"Chief Complaint  Arthritis and Adenopathy    Subjective          Loan Nam presents to [facility]  History of Present Illness     Pt is here to f/u on HTN. Pt will need refills on her BP meds. Pt would like these sent to [facility].     Pt has c/o swollen glands. Pt went to  approximately 1 wk ago and was given Keflex 500 mg for sinus infection and swollen glands. Pt states she is much better but would like a different medication for this. Pt states she is still having some swelling in the glands on the (R) side of the back of her neck. No s/s of a sinus infection. States just finished 7 day antibiotic.  States the glands have gone down in size, but have not completely resolved.    Pt would like to discuss a medication for arthritis for PRN use. Pt gets inj in her knees. Pt will not have another inj until 1/2022 due to insurance purposes. Pt does not want a daily medication or a controlled medication.     Objective   Vital Signs:   Ht 149.9 cm (59\")   Wt 103 kg (228 lb)   BMI 46.05 kg/m²     Physical Exam  Vitals reviewed.   Constitutional:       Appearance: Normal appearance. She is well-developed.   HENT:      Head: Normocephalic and atraumatic.      Right Ear: External ear normal.      Left Ear: External ear normal.      Mouth/Throat:      Pharynx: No oropharyngeal exudate.   Eyes:      Conjunctiva/sclera: Conjunctivae normal.      Pupils: Pupils are equal, round, and reactive to light.   Cardiovascular:      Rate and Rhythm: Normal rate and regular rhythm.      Heart sounds: No murmur heard.  No friction rub. No gallop.       Comments: Trace edema bilateral  Pulmonary:      Effort: Pulmonary effort is normal.      Breath sounds: Normal breath sounds. No wheezing or rhonchi.   Skin:     General: Skin is warm and dry.   Neurological:      Mental Status: She is alert and oriented to person, place, and time.      Cranial Nerves: No cranial nerve deficit.   Psychiatric:         " Mood and Affect: Mood and affect normal.         Behavior: Behavior normal.         Thought Content: Thought content normal.         Judgment: Judgment normal.        Result Review :     CMP    CMP 2/27/21   Glucose 87   BUN 19   Creatinine 1.01 (A)   Sodium 141   Potassium 4.3   Chloride 108   Calcium 9.1   Albumin 4.0   Total Bilirubin 0.45   Alkaline Phosphatase 144   AST (SGOT) 13 (A)   ALT (SGPT) 12   (A) Abnormal value            CBC    CBC 2/27/21   WBC 9.63   RBC 4.52   Hemoglobin 13.4   Hematocrit 42.1   MCV 93.1   MCH 29.6   MCHC 31.8 (A)   RDW 13.9   Platelets 272   (A) Abnormal value            Lipid Panel    Lipid Panel 2/27/21   Total Cholesterol 142   Triglycerides 89   HDL Cholesterol 35 (A)   VLDL Cholesterol 18   LDL Cholesterol  89   (A) Abnormal value       Comments are available for some flowsheets but are not being displayed.                     Assessment and Plan    Diagnoses and all orders for this visit:    1. Primary hypertension (Primary)  Assessment & Plan:  Hypertension is not at goal currently.  Manual blood pressure 138/62.  Discussed with patient that the goal is to be less than 130/80.  Patient is to monitor her blood pressure at home we will follow-up with PCP if remaining above goal.  Continue current medications, refill sent.  Lab order placed today for future order, patient states she cannot afford to do these labs until after the first of the year due to an insurance change.  She will obtain her labs after January 1.    Orders:  -     carvedilol (COREG) 3.125 MG tablet; Take 1 tablet by mouth 2 (Two) Times a Day With Meals.  Dispense: 180 tablet; Refill: 1  -     hydroCHLOROthiazide (HYDRODIURIL) 25 MG tablet; Take 1 tablet by mouth Daily.  Dispense: 90 tablet; Refill: 1  -     lisinopril (PRINIVIL,ZESTRIL) 40 MG tablet; Take 1 tablet by mouth Daily.  Dispense: 90 tablet; Refill: 1  -     Comprehensive Metabolic Panel; Future  -     CBC & Differential; Future  -     Lipid  Panel; Future    2. Gastroesophageal reflux disease, unspecified whether esophagitis present  -     omeprazole (priLOSEC) 20 MG capsule; Take 1 capsule by mouth Daily.  Dispense: 90 capsule; Refill: 1    3. Depression, unspecified depression type  -     busPIRone (BUSPAR) 15 MG tablet; Take 1 tablet by mouth 2 (Two) Times a Day.  Dispense: 180 tablet; Refill: 0    4. Thyroid disorder screen  -     TSH; Future    5. Osteoarthritis of both knees, unspecified osteoarthritis type  Assessment & Plan:  Discussed with patient that I recommend trial of over-the-counter Voltaren gel.        Follow Up   Return in about 6 months (around 5/1/2022).  Patient was given instructions and counseling regarding her condition or for health maintenance advice. Please see specific information pulled into the AVS if appropriate.

## 2021-11-01 NOTE — ASSESSMENT & PLAN NOTE
Hypertension is not at goal currently.  Manual blood pressure 138/62.  Discussed with patient that the goal is to be less than 130/80.  Patient is to monitor her blood pressure at home we will follow-up with PCP if remaining above goal.  Continue current medications, refill sent.  Lab order placed today for future order, patient states she cannot afford to do these labs until after the first of the year due to an insurance change.  She will obtain her labs after January 1.

## 2021-12-13 ENCOUNTER — OFFICE VISIT (OUTPATIENT)
Dept: FAMILY MEDICINE CLINIC | Facility: CLINIC | Age: 66
End: 2021-12-13

## 2021-12-13 VITALS
DIASTOLIC BLOOD PRESSURE: 91 MMHG | WEIGHT: 226 LBS | BODY MASS INDEX: 45.56 KG/M2 | SYSTOLIC BLOOD PRESSURE: 155 MMHG | HEART RATE: 82 BPM | HEIGHT: 59 IN | OXYGEN SATURATION: 100 %

## 2021-12-13 DIAGNOSIS — I10 PRIMARY HYPERTENSION: ICD-10-CM

## 2021-12-13 DIAGNOSIS — R30.0 DYSURIA: Primary | ICD-10-CM

## 2021-12-13 DIAGNOSIS — R35.0 URINARY FREQUENCY: ICD-10-CM

## 2021-12-13 LAB
BILIRUB BLD-MCNC: NEGATIVE MG/DL
CLARITY, POC: CLEAR
COLOR UR: YELLOW
EXPIRATION DATE: ABNORMAL
GLUCOSE UR STRIP-MCNC: NEGATIVE MG/DL
KETONES UR QL: NEGATIVE
LEUKOCYTE EST, POC: ABNORMAL
Lab: ABNORMAL
NITRITE UR-MCNC: NEGATIVE MG/ML
PH UR: 5.5 [PH] (ref 5–8)
PROT UR STRIP-MCNC: ABNORMAL MG/DL
RBC # UR STRIP: ABNORMAL /UL
SP GR UR: 1.02 (ref 1–1.03)
UROBILINOGEN UR QL: NORMAL

## 2021-12-13 PROCEDURE — 81003 URINALYSIS AUTO W/O SCOPE: CPT | Performed by: NURSE PRACTITIONER

## 2021-12-13 PROCEDURE — 87086 URINE CULTURE/COLONY COUNT: CPT | Performed by: NURSE PRACTITIONER

## 2021-12-13 PROCEDURE — 99214 OFFICE O/P EST MOD 30 MIN: CPT | Performed by: NURSE PRACTITIONER

## 2021-12-13 RX ORDER — PHENAZOPYRIDINE HYDROCHLORIDE 100 MG/1
100 TABLET, FILM COATED ORAL 3 TIMES DAILY PRN
Qty: 12 TABLET | Refills: 0 | Status: SHIPPED | OUTPATIENT
Start: 2021-12-13 | End: 2022-06-14

## 2021-12-13 RX ORDER — NITROFURANTOIN 25; 75 MG/1; MG/1
100 CAPSULE ORAL 2 TIMES DAILY
Qty: 20 CAPSULE | Refills: 0 | Status: SHIPPED | OUTPATIENT
Start: 2021-12-13 | End: 2022-06-14

## 2021-12-13 RX ORDER — FLUCONAZOLE 150 MG/1
TABLET ORAL
Qty: 2 TABLET | Refills: 0 | Status: SHIPPED | OUTPATIENT
Start: 2021-12-13 | End: 2022-03-18 | Stop reason: SDUPTHER

## 2021-12-13 NOTE — ASSESSMENT & PLAN NOTE
Hypertension is poorly controlled at present, stressed to patient once again that she needs to follow-up with PCP for further evaluation and management of her hypertension.  Patient is to monitor blood pressure at home and bring blood pressure log to her appointment

## 2021-12-13 NOTE — PROGRESS NOTES
Chief Complaint  Dysuria, urinary frequency   Subjective          Loan Hornback presents to [facility] for   History of Present Illness  states started last night.  Started having urinary frequency and burning.  Patient states that it is very uncomfortable, is requesting something to help with symptoms.  Denies any nausea vomiting or fever.    Medical History  Past Medical History:   Diagnosis Date   • Acid reflux    • Allergic rhinitis    • Anemia     unspecified   • Arthritis     2016 left knee   • Asthma    • Bile salt-induced diarrhea    • Depression    • H/O bone density study     never   • History of pulmonary embolism    • Hypertension    • Limb swelling    • Moderate episode of recurrent major depressive disorder (HCC) 2020   • Muscle cramp, nocturnal 2018   • Pap smear for cervical cancer screening     no longer hysterectomy   • Renal insufficiency 2021   • Seasonal allergies    • SOB (shortness of breath)    • Venous insufficiency of left leg 2018    she needs to elevated nad wear compression hose. 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   • Visit for screening mammogram 10/2019    normal     Surgical History  Past Surgical History:   Procedure Laterality Date   • APPENDECTOMY     •  SECTION     • CHOLECYSTECTOMY     • COLONOSCOPY  2014    normal   • GALLBLADDER SURGERY     • HYSTERECTOMY     • OTHER SURGICAL HISTORY      mass removal on neck   • PLANTAR FASCIA SURGERY Left    • TONSILLECTOMY       Social History  Social History     Socioeconomic History   • Marital status:    Tobacco Use   • Smoking status: Never Smoker   • Smokeless tobacco: Never Used   Vaping Use   • Vaping Use: Never used   Substance and Sexual Activity   • Alcohol use: Not Currently   • Drug use: Never   • Sexual activity: Defer       Current Outpatient Medications:   •  busPIRone (BUSPAR) 15 MG tablet, Take 1 tablet  "by mouth 2 (Two) Times a Day., Disp: 180 tablet, Rfl: 0  •  carvedilol (COREG) 3.125 MG tablet, Take 1 tablet by mouth 2 (Two) Times a Day With Meals., Disp: 180 tablet, Rfl: 1  •  hydroCHLOROthiazide (HYDRODIURIL) 25 MG tablet, Take 1 tablet by mouth Daily., Disp: 90 tablet, Rfl: 1  •  hyoscyamine (Levsin) 0.125 MG tablet, Levsin 0.125 mg oral tablet take 1 tablet (0.125 mg) by oral route 3 times per day   Suspended, Disp: , Rfl:   •  lisinopril (PRINIVIL,ZESTRIL) 40 MG tablet, Take 1 tablet by mouth Daily., Disp: 90 tablet, Rfl: 1  •  naproxen (NAPROSYN) 500 MG tablet, Take 500 mg by mouth., Disp: , Rfl:   •  omeprazole (priLOSEC) 20 MG capsule, Take 1 capsule by mouth Daily., Disp: 90 capsule, Rfl: 1  •  fluconazole (Diflucan) 150 MG tablet, 1 tab PO at symptom onset, repeat in 72hr, Disp: 2 tablet, Rfl: 0  •  nitrofurantoin, macrocrystal-monohydrate, (Macrobid) 100 MG capsule, Take 1 capsule by mouth 2 (Two) Times a Day., Disp: 20 capsule, Rfl: 0  •  phenazopyridine (Pyridium) 100 MG tablet, Take 1 tablet by mouth 3 (Three) Times a Day As Needed for Bladder Spasms., Disp: 12 tablet, Rfl: 0    Review of Systems     Objective     /91   Pulse 82   Ht 149.9 cm (59\")   Wt 103 kg (226 lb)   SpO2 100%   BMI 45.65 kg/m²     Body mass index is 45.65 kg/m².    Physical Exam  Vitals reviewed.   Constitutional:       Appearance: Normal appearance. She is well-developed.   HENT:      Head: Normocephalic and atraumatic.      Right Ear: External ear normal.      Left Ear: External ear normal.   Eyes:      Conjunctiva/sclera: Conjunctivae normal.      Pupils: Pupils are equal, round, and reactive to light.   Cardiovascular:      Rate and Rhythm: Normal rate and regular rhythm.      Heart sounds: No murmur heard.  No friction rub. No gallop.    Pulmonary:      Effort: Pulmonary effort is normal.      Breath sounds: Normal breath sounds. No wheezing or rhonchi.   Abdominal:      General: Bowel sounds are normal. There " is no distension.      Palpations: Abdomen is soft.      Tenderness: There is no abdominal tenderness. There is no right CVA tenderness or left CVA tenderness.   Skin:     General: Skin is warm and dry.   Neurological:      Mental Status: She is alert and oriented to person, place, and time.      Cranial Nerves: No cranial nerve deficit.   Psychiatric:         Mood and Affect: Mood and affect normal.         Behavior: Behavior normal.         Thought Content: Thought content normal.         Judgment: Judgment normal.         Result Review :     The following data was reviewed by: ARIN Valadez on 12/13/2021:                 POCT urinalysis dipstick, automated  Order: [id number]   Status: Final result     Visible to patient: Evelia (scheduled for 12/14/2021  3:11 AM)     Dx: Dysuria    Specimen Information: Urine         0 Result Notes    Component   Ref Range & Units 13:10   Color   Yellow, Straw, Dark Yellow, Tabatha Yellow    Clarity, UA   Clear Clear    Specific Gravity    1.005 - 1.030 1.025    pH, Urine   5.0 - 8.0 5.5    Leukocytes   Negative Small (1+) Abnormal     Nitrite, UA   Negative Negative    Protein, POC   Negative mg/dL 30 mg/dL Abnormal     Glucose, UA   Negative, 1000 mg/dL (3+) mg/dL Negative    Ketones, UA   Negative Negative    Urobilinogen, UA   Normal Normal    Bilirubin   Negative Negative    Blood, UA   Negative Small Abnormal     Lot Number  [id number]    Expiration Date  10/31/2022                Assessment:  Diagnoses and all orders for this visit:    1. Dysuria (Primary)  -     POCT urinalysis dipstick, automated  -     Urine Culture - Urine, Urine, Clean Catch; Future  -     phenazopyridine (Pyridium) 100 MG tablet; Take 1 tablet by mouth 3 (Three) Times a Day As Needed for Bladder Spasms.  Dispense: 12 tablet; Refill: 0  -     nitrofurantoin, macrocrystal-monohydrate, (Macrobid) 100 MG capsule; Take 1 capsule by mouth 2 (Two) Times a Day.  Dispense: 20 capsule; Refill: 0    2. Urinary  frequency  -     POCT urinalysis dipstick, automated  -     Urine Culture - Urine, Urine, Clean Catch; Future  -     phenazopyridine (Pyridium) 100 MG tablet; Take 1 tablet by mouth 3 (Three) Times a Day As Needed for Bladder Spasms.  Dispense: 12 tablet; Refill: 0  -     nitrofurantoin, macrocrystal-monohydrate, (Macrobid) 100 MG capsule; Take 1 capsule by mouth 2 (Two) Times a Day.  Dispense: 20 capsule; Refill: 0    3. Primary hypertension  Assessment & Plan:  Hypertension is poorly controlled at present, stressed to patient once again that she needs to follow-up with PCP for further evaluation and management of her hypertension.  Patient is to monitor blood pressure at home and bring blood pressure log to her appointment      Other orders  -     fluconazole (Diflucan) 150 MG tablet; 1 tab PO at symptom onset, repeat in 72hr  Dispense: 2 tablet; Refill: 0            Follow Up     Return if symptoms worsen or fail to improve.  Follow-up with PCP for reeval blood pressure    Patient was given instructions and counseling regarding her condition or for health maintenance advice. Please see specific information pulled into the AVS if appropriate.     Jaqueline Giraldo, APRN  12/13/2021

## 2021-12-16 LAB
BACTERIA UR CULT: NO GROWTH
BACTERIA UR CULT: NORMAL

## 2021-12-23 ENCOUNTER — TELEPHONE (OUTPATIENT)
Dept: FAMILY MEDICINE CLINIC | Facility: CLINIC | Age: 66
End: 2021-12-23

## 2021-12-23 NOTE — TELEPHONE ENCOUNTER
----- Message from ARIN Hawley sent at 12/20/2021  7:54 AM EST -----  No UTI, need to recheck her urine in 2 weeks to ensure resolution of blood.

## 2021-12-27 ENCOUNTER — TELEPHONE (OUTPATIENT)
Dept: ORTHOPEDIC SURGERY | Facility: CLINIC | Age: 66
End: 2021-12-27

## 2021-12-27 NOTE — TELEPHONE ENCOUNTER
Provider: BOBBY  Caller: MONA  Phone Number: [phone]  Reason for Call:  Pt is calling TO GET SCHEDULE FOR HER KNEE INJECTIONS

## 2022-01-03 ENCOUNTER — OFFICE VISIT (OUTPATIENT)
Dept: ORTHOPEDIC SURGERY | Facility: CLINIC | Age: 67
End: 2022-01-03

## 2022-01-03 VITALS — WEIGHT: 226 LBS | HEIGHT: 59 IN | BODY MASS INDEX: 45.56 KG/M2

## 2022-01-03 DIAGNOSIS — M17.0 BILATERAL PRIMARY OSTEOARTHRITIS OF KNEE: Primary | ICD-10-CM

## 2022-01-03 PROCEDURE — 20610 DRAIN/INJ JOINT/BURSA W/O US: CPT | Performed by: ORTHOPAEDIC SURGERY

## 2022-01-03 RX ORDER — BUPIVACAINE HYDROCHLORIDE 2.5 MG/ML
5 INJECTION, SOLUTION INFILTRATION; PERINEURAL
Status: COMPLETED | OUTPATIENT
Start: 2022-01-03 | End: 2022-01-03

## 2022-01-03 RX ORDER — TRIAMCINOLONE ACETONIDE 40 MG/ML
40 INJECTION, SUSPENSION INTRA-ARTICULAR; INTRAMUSCULAR
Status: COMPLETED | OUTPATIENT
Start: 2022-01-03 | End: 2022-01-03

## 2022-01-03 RX ADMIN — BUPIVACAINE HYDROCHLORIDE 5 ML: 2.5 INJECTION, SOLUTION INFILTRATION; PERINEURAL at 09:21

## 2022-01-03 RX ADMIN — TRIAMCINOLONE ACETONIDE 40 MG: 40 INJECTION, SUSPENSION INTRA-ARTICULAR; INTRAMUSCULAR at 09:21

## 2022-01-03 RX ADMIN — BUPIVACAINE HYDROCHLORIDE 5 ML: 2.5 INJECTION, SOLUTION INFILTRATION; PERINEURAL at 09:20

## 2022-01-03 RX ADMIN — TRIAMCINOLONE ACETONIDE 40 MG: 40 INJECTION, SUSPENSION INTRA-ARTICULAR; INTRAMUSCULAR at 09:20

## 2022-01-03 NOTE — PROGRESS NOTES
"Chief Complaint  Follow-up of the Right Knee and Follow-up of the Left Knee     Subjective      Loan Nam presents to [facility] for a follow-up of bilaterals knees. Patient has a history of bilateral knee osteoarthritis that has been treated conservatively in the past with cortisone injections. She states bilateral knee pain has progressively gotten worse.    Allergies   Allergen Reactions   • Contrast Dye Hives   • Penicillins Swelling   • Sulfa Antibiotics Rash        Social History     Socioeconomic History   • Marital status:    Tobacco Use   • Smoking status: Never Smoker   • Smokeless tobacco: Never Used   Vaping Use   • Vaping Use: Never used   Substance and Sexual Activity   • Alcohol use: Not Currently   • Drug use: Never   • Sexual activity: Defer        Review of Systems     Objective   Vital Signs:   Ht 149.9 cm (59\")   Wt 103 kg (226 lb)   BMI 45.65 kg/m²       Physical Exam  Constitutional:       Appearance: Normal appearance. Patient is well-developed and normal weight.   HENT:      Head: Normocephalic.      Right Ear: Hearing and external ear normal.      Left Ear: Hearing and external ear normal.      Nose: Nose normal.   Eyes:      Conjunctiva/sclera: Conjunctivae normal.   Cardiovascular:      Rate and Rhythm: Normal rate.   Pulmonary:      Effort: Pulmonary effort is normal.      Breath sounds: No wheezing or rales.   Abdominal:      Palpations: Abdomen is soft.      Tenderness: There is no abdominal tenderness.   Musculoskeletal:      Cervical back: Normal range of motion.   Skin:     Findings: No rash.   Neurological:      Mental Status: Patient  is alert and oriented to person, place, and time.   Psychiatric:         Mood and Affect: Mood and affect normal.         Judgment: Judgment normal.       Ortho Exam      LEFT KNEE: Good strength to hamstrings, quadriceps, dorsiflexors and plantar flexors. Full extension. Flexion to 125 degrees. Calf " supple, non-tender, no signs of DVT. Dorsal Pedal Pulse 2+, posterior tibialis pulse 2+. Tender medial joint line. Tender patella. Sensation grossly intact. Neurovascular intact.      RIGHT KNEE: Sensation grossly intact. Neurovascular intact.  Dorsal Pedal Pulse 2+, posterior tibialis pulse 2+. Calf supple, non-tender, no signs of DVT. Tender medial joint line. Calf supple, non-tender, no signs of DVT. Full extension. Flexion to 125 degrees. Non-antalgic gait.       Large Joint Arthrocentesis: R knee  Date/Time: 1/3/2022 9:20 AM  Consent given by: patient  Site marked: site marked  Timeout: Immediately prior to procedure a time out was called to verify the correct patient, procedure, equipment, support staff and site/side marked as required   Supporting Documentation  Indications: pain   Procedure Details  Location: knee - R knee  Needle size: 22 G  Medications administered: 5 mL bupivacaine 0.25 %; 40 mg triamcinolone acetonide 40 MG/ML  Patient tolerance: patient tolerated the procedure well with no immediate complications    Large Joint Arthrocentesis: L knee  Date/Time: 1/3/2022 9:21 AM  Consent given by: patient  Site marked: site marked  Timeout: Immediately prior to procedure a time out was called to verify the correct patient, procedure, equipment, support staff and site/side marked as required   Supporting Documentation  Indications: pain   Procedure Details  Location: knee - L knee  Needle size: 22 G  Medications administered: 5 mL bupivacaine 0.25 %; 40 mg triamcinolone acetonide 40 MG/ML  Patient tolerance: patient tolerated the procedure well with no immediate complications              Imaging Results (Most Recent)     Procedure Component Value Units Date/Time    XR Knee 3 View Bilateral [[id number]] Resulted: 01/03/22 0853     Updated: 01/03/22 0855           Result Review :     X-Ray Report:  Bilateral knee(s) X-Ray  Indication: Evaluation of bilateral knee pain   AP, Lateral and Standing  view(s)  Findings: Right knee demonstrates joint space narrowing of the medial compartment and patellofemoral compartment. Degenerative changes present in the patellofemoral compartment of the left knee. No fracture or dislocation.   Prior studies available for comparison: no     Assessment and Plan     DX: Bilateral knee osteoarthritis     Discussed treatment plans with the patient. Gel injections discussed. She opted to try a steroid injection again. She was given bilateral knee injections, she tolerated this well.     Call or return if worsening symptoms.    Follow Up     PRN.       Patient was given instructions and counseling regarding her condition or for health maintenance advice. Please see specific information pulled into the AVS if appropriate.     Scribed for Mike Mckinney MD by Bailey Patiño.  01/03/22   08:58 EST    I have personally performed the services described in this document as scribed by the above individual and it is both accurate and complete. Mike Mckinney MD 01/03/22

## 2022-03-18 ENCOUNTER — TELEPHONE (OUTPATIENT)
Dept: FAMILY MEDICINE CLINIC | Facility: CLINIC | Age: 67
End: 2022-03-18

## 2022-03-18 DIAGNOSIS — B37.9 CANDIDA INFECTION: Primary | ICD-10-CM

## 2022-03-18 RX ORDER — FLUCONAZOLE 150 MG/1
TABLET ORAL
Qty: 2 TABLET | Refills: 0 | Status: SHIPPED | OUTPATIENT
Start: 2022-03-18 | End: 2022-06-14

## 2022-03-18 NOTE — TELEPHONE ENCOUNTER
Phoned patient to remind her of lab work. She states she is getting lab work done for her Job tomorrow and she will bring us a copy of this lab work.

## 2022-03-18 NOTE — TELEPHONE ENCOUNTER
Caller: Loan Nam    Relationship: Self    Best call back number: [phone]    What medication are you requesting:  YEAST INFECTION MEDICATION    What are your current symptoms: VAGINAL ITCHING, DISCHARGE    How long have you been experiencing symptoms: 2-3 DAYS    Have you had these symptoms before:    [x] Yes  [] No    Have you been treated for these symptoms before:   [x] Yes  [] No    If a prescription is needed, what is your preferred pharmacy and phone number: [facility] [place] [address] - [phone] [facility] [phone] [facility]     Additional notes:

## 2022-03-26 ENCOUNTER — LAB (OUTPATIENT)
Dept: LAB | Facility: HOSPITAL | Age: 67
End: 2022-03-26

## 2022-03-26 DIAGNOSIS — I10 PRIMARY HYPERTENSION: ICD-10-CM

## 2022-03-26 DIAGNOSIS — Z13.29 THYROID DISORDER SCREEN: ICD-10-CM

## 2022-03-26 LAB
CHOLEST SERPL-MCNC: 150 MG/DL (ref 0–200)
HDLC SERPL-MCNC: 33 MG/DL (ref 40–60)
LDLC SERPL CALC-MCNC: 94 MG/DL (ref 0–100)
LDLC/HDLC SERPL: 2.79 {RATIO}
TRIGL SERPL-MCNC: 125 MG/DL (ref 0–150)
TSH SERPL DL<=0.05 MIU/L-ACNC: 2.97 UIU/ML (ref 0.27–4.2)
VLDLC SERPL-MCNC: 23 MG/DL (ref 5–40)

## 2022-03-26 PROCEDURE — 36415 COLL VENOUS BLD VENIPUNCTURE: CPT

## 2022-03-26 PROCEDURE — 84443 ASSAY THYROID STIM HORMONE: CPT

## 2022-03-26 PROCEDURE — 80061 LIPID PANEL: CPT

## 2022-03-28 ENCOUNTER — TELEPHONE (OUTPATIENT)
Dept: FAMILY MEDICINE CLINIC | Facility: CLINIC | Age: 67
End: 2022-03-28

## 2022-03-28 NOTE — TELEPHONE ENCOUNTER
----- Message from ARIN Olson sent at 3/28/2022  9:37 AM EDT -----  TSH WNL. Lipid normal with exception of low HDL recommend increasing exercise

## 2022-05-04 ENCOUNTER — OFFICE VISIT (OUTPATIENT)
Dept: FAMILY MEDICINE CLINIC | Facility: CLINIC | Age: 67
End: 2022-05-04

## 2022-05-04 VITALS
HEIGHT: 59 IN | SYSTOLIC BLOOD PRESSURE: 153 MMHG | HEART RATE: 97 BPM | BODY MASS INDEX: 45.76 KG/M2 | DIASTOLIC BLOOD PRESSURE: 66 MMHG | WEIGHT: 227 LBS | OXYGEN SATURATION: 99 %

## 2022-05-04 DIAGNOSIS — J34.89 NASAL SORE: ICD-10-CM

## 2022-05-04 DIAGNOSIS — F32.A DEPRESSION, UNSPECIFIED DEPRESSION TYPE: ICD-10-CM

## 2022-05-04 DIAGNOSIS — I10 HYPERTENSION, UNSPECIFIED TYPE: ICD-10-CM

## 2022-05-04 DIAGNOSIS — K21.9 GASTROESOPHAGEAL REFLUX DISEASE, UNSPECIFIED WHETHER ESOPHAGITIS PRESENT: ICD-10-CM

## 2022-05-04 DIAGNOSIS — Z00.00 ANNUAL PHYSICAL EXAM: Primary | ICD-10-CM

## 2022-05-04 DIAGNOSIS — J45.909 UNCOMPLICATED ASTHMA, UNSPECIFIED ASTHMA SEVERITY, UNSPECIFIED WHETHER PERSISTENT: ICD-10-CM

## 2022-05-04 DIAGNOSIS — T78.40XD ALLERGY, SUBSEQUENT ENCOUNTER: ICD-10-CM

## 2022-05-04 DIAGNOSIS — Z12.31 SCREENING MAMMOGRAM FOR BREAST CANCER: ICD-10-CM

## 2022-05-04 DIAGNOSIS — J30.2 SEASONAL ALLERGIES: ICD-10-CM

## 2022-05-04 DIAGNOSIS — I10 PRIMARY HYPERTENSION: ICD-10-CM

## 2022-05-04 PROCEDURE — 99396 PREV VISIT EST AGE 40-64: CPT | Performed by: NURSE PRACTITIONER

## 2022-05-04 PROCEDURE — 99214 OFFICE O/P EST MOD 30 MIN: CPT | Performed by: NURSE PRACTITIONER

## 2022-05-04 RX ORDER — AMLODIPINE BESYLATE 5 MG/1
5 TABLET ORAL DAILY
Qty: 30 TABLET | Refills: 0 | Status: SHIPPED | OUTPATIENT
Start: 2022-05-04 | End: 2022-05-04 | Stop reason: SDUPTHER

## 2022-05-04 RX ORDER — MUPIROCIN CALCIUM 20 MG/G
1 CREAM TOPICAL 3 TIMES DAILY
Qty: 15 G | Refills: 0 | Status: SHIPPED | OUTPATIENT
Start: 2022-05-04 | End: 2022-05-04 | Stop reason: SDUPTHER

## 2022-05-04 RX ORDER — CARVEDILOL 3.12 MG/1
3.12 TABLET ORAL 2 TIMES DAILY WITH MEALS
Qty: 180 TABLET | Refills: 1 | Status: SHIPPED | OUTPATIENT
Start: 2022-05-04 | End: 2022-06-14

## 2022-05-04 RX ORDER — AMLODIPINE BESYLATE 5 MG/1
5 TABLET ORAL DAILY
Qty: 30 TABLET | Refills: 0 | Status: SHIPPED | OUTPATIENT
Start: 2022-05-04 | End: 2022-05-27

## 2022-05-04 RX ORDER — BUSPIRONE HYDROCHLORIDE 15 MG/1
15 TABLET ORAL 2 TIMES DAILY
Qty: 180 TABLET | Refills: 1 | Status: SHIPPED | OUTPATIENT
Start: 2022-05-04 | End: 2022-11-08 | Stop reason: SDUPTHER

## 2022-05-04 RX ORDER — MUPIROCIN CALCIUM 20 MG/G
1 CREAM TOPICAL 3 TIMES DAILY
Qty: 15 G | Refills: 0 | Status: SHIPPED | OUTPATIENT
Start: 2022-05-04 | End: 2022-05-10 | Stop reason: SDUPTHER

## 2022-05-04 RX ORDER — HYDROCHLOROTHIAZIDE 25 MG/1
25 TABLET ORAL DAILY
Qty: 90 TABLET | Refills: 1 | Status: SHIPPED | OUTPATIENT
Start: 2022-05-04 | End: 2022-11-08 | Stop reason: SDUPTHER

## 2022-05-04 RX ORDER — METHYLPREDNISOLONE 4 MG/1
TABLET ORAL
Qty: 21 EACH | Refills: 0 | Status: SHIPPED | OUTPATIENT
Start: 2022-05-04 | End: 2022-06-14

## 2022-05-04 RX ORDER — OMEPRAZOLE 20 MG/1
20 CAPSULE, DELAYED RELEASE ORAL DAILY
Qty: 90 CAPSULE | Refills: 1 | Status: SHIPPED | OUTPATIENT
Start: 2022-05-04 | End: 2022-11-08 | Stop reason: SDUPTHER

## 2022-05-04 RX ORDER — METHYLPREDNISOLONE 4 MG/1
TABLET ORAL
Qty: 21 EACH | Refills: 0 | Status: SHIPPED | OUTPATIENT
Start: 2022-05-04 | End: 2022-05-04 | Stop reason: SDUPTHER

## 2022-05-04 RX ORDER — LISINOPRIL 40 MG/1
40 TABLET ORAL DAILY
Qty: 90 TABLET | Refills: 1 | Status: SHIPPED | OUTPATIENT
Start: 2022-05-04 | End: 2022-11-08 | Stop reason: SDUPTHER

## 2022-05-04 RX ORDER — ALBUTEROL SULFATE 90 UG/1
2 AEROSOL, METERED RESPIRATORY (INHALATION) EVERY 4 HOURS PRN
Qty: 18 G | Refills: 2 | Status: SHIPPED | OUTPATIENT
Start: 2022-05-04 | End: 2022-11-08 | Stop reason: SDUPTHER

## 2022-05-04 NOTE — PROGRESS NOTES
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     Pt would like to discuss medications for seasonal allergies and would like an albuterol inhaler called in.    Mammo due.    Labs done March this year, WNL     Refills sent to [facility]      Past Medical History:   Diagnosis Date   • Acid reflux    • Allergic rhinitis    • Anemia     unspecified   • Arthritis     2016 left knee   • Asthma    • Bile salt-induced diarrhea    • Depression    • H/O bone density study     never   • History of pulmonary embolism    • Hypertension    • Limb swelling    • Moderate episode of recurrent major depressive disorder (HCC) 2020   • Muscle cramp, nocturnal 2018   • Pap smear for cervical cancer screening     no longer hysterectomy   • Renal insufficiency 2021   • Seasonal allergies    • SOB (shortness of breath)    • Venous insufficiency of left leg 2018    she needs to elevated nad wear compression hose. 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   • Visit for screening mammogram 10/2019    normal      Family History   Problem Relation Age of Onset   • Hypertension Mother    • Cancer Mother    • Arthritis Mother    • Osteoporosis Mother    • Hypertension Father    • Cancer Father    • Other Father         blood diseases   • Hypertension Sister    • Cancer Sister    • Arthritis Sister       Past Surgical History:   Procedure Laterality Date   • APPENDECTOMY     •  SECTION     • CHOLECYSTECTOMY     • COLONOSCOPY  2014    normal   • GALLBLADDER SURGERY     • HYSTERECTOMY     • OTHER SURGICAL HISTORY      mass removal on neck   • PLANTAR FASCIA SURGERY Left    • TONSILLECTOMY            Current Outpatient Medications:   •  busPIRone (BUSPAR) 15 MG tablet, Take 1 tablet by mouth 2 (Two) Times a  "Day., Disp: 180 tablet, Rfl: 1  •  carvedilol (COREG) 3.125 MG tablet, Take 1 tablet by mouth 2 (Two) Times a Day With Meals., Disp: 180 tablet, Rfl: 1  •  hydroCHLOROthiazide (HYDRODIURIL) 25 MG tablet, Take 1 tablet by mouth Daily., Disp: 90 tablet, Rfl: 1  •  lisinopril (PRINIVIL,ZESTRIL) 40 MG tablet, Take 1 tablet by mouth Daily., Disp: 90 tablet, Rfl: 1  •  methylPREDNISolone (MEDROL) 4 MG dose pack, Take as directed on package instructions., Disp: 21 each, Rfl: 0  •  omeprazole (priLOSEC) 20 MG capsule, Take 1 capsule by mouth Daily., Disp: 90 capsule, Rfl: 1  •  albuterol sulfate  (90 Base) MCG/ACT inhaler, Inhale 2 puffs Every 4 (Four) Hours As Needed for Wheezing., Disp: 18 g, Rfl: 2  •  fluconazole (Diflucan) 150 MG tablet, 1 tab PO at symptom onset, repeat in 72hr, Disp: 2 tablet, Rfl: 0  •  hyoscyamine (ANASPAZ,LEVSIN) 0.125 MG tablet, Levsin 0.125 mg oral tablet take 1 tablet (0.125 mg) by oral route 3 times per day   Suspended, Disp: , Rfl:   •  mupirocin (Bactroban Nasal) 2 % nasal ointment, into the nostril(s) as directed by provider 2 (Two) Times a Day., Disp: 15 g, Rfl: 0  •  naproxen (NAPROSYN) 500 MG tablet, Take 500 mg by mouth., Disp: , Rfl:   •  nitrofurantoin, macrocrystal-monohydrate, (Macrobid) 100 MG capsule, Take 1 capsule by mouth 2 (Two) Times a Day., Disp: 20 capsule, Rfl: 0  •  phenazopyridine (Pyridium) 100 MG tablet, Take 1 tablet by mouth 3 (Three) Times a Day As Needed for Bladder Spasms., Disp: 12 tablet, Rfl: 0    Objective     Vital Signs:     /66   Pulse 97   Ht 149.9 cm (59\")   Wt 103 kg (227 lb)   SpO2 99%   BMI 45.85 kg/m²    Estimated body mass index is 45.85 kg/m² as calculated from the following:    Height as of this encounter: 149.9 cm (59\").    Weight as of this encounter: 103 kg (227 lb).     Wt Readings from Last 3 Encounters:   05/04/22 103 kg (227 lb)   01/03/22 103 kg (226 lb)   12/13/21 103 kg (226 lb)     BP Readings from Last 3 Encounters: "   05/04/22 153/66   12/13/21 155/91   10/24/21 149/98       Physical Exam  Constitutional:       Appearance: Normal appearance. She is obese.   HENT:      Head:      Comments: ttp over maxillary and frontal sinuses  Neck:      Thyroid: No thyroid mass, thyromegaly or thyroid tenderness.      Vascular: No carotid bruit.   Cardiovascular:      Rate and Rhythm: Normal rate and regular rhythm.      Pulses: Normal pulses.      Heart sounds: Normal heart sounds.   Pulmonary:      Effort: Pulmonary effort is normal.      Breath sounds: Normal breath sounds.   Musculoskeletal:      Right lower leg: No edema.      Left lower leg: No edema.   Skin:     General: Skin is warm and dry.   Neurological:      General: No focal deficit present.      Mental Status: She is alert and oriented to person, place, and time.   Psychiatric:         Mood and Affect: Mood normal.         Behavior: Behavior normal.          Result Review :   The following data was reviewed by: ARIN Olson on 05/04/2022:  CMP    CMP 5/5/22   Glucose 96   BUN 19   Creatinine 1.35 (A)   Sodium 143   Potassium 3.3 (A)   Chloride 105   Calcium 8.8   Albumin 4.40   Total Bilirubin 0.4   Alkaline Phosphatase 120 (A)   AST (SGOT) 17   ALT (SGPT) 11   (A) Abnormal value                  Lipid Panel    Lipid Panel 3/26/22   Total Cholesterol 150   Triglycerides 125   HDL Cholesterol 33 (A)   VLDL Cholesterol 23   LDL Cholesterol  94   LDL/HDL Ratio 2.79   (A) Abnormal value            TSH    TSH 3/26/22   TSH 2.970                 Patient Care Team:  Kathie Andino APRN as PCP - General (Nurse Practitioner)    Class 3 Severe Obesity (BMI >=40). Obesity-related health conditions include the following: hypertension and GERD. Obesity is unchanged. BMI is is above average; BMI management plan is completed. We discussed portion control and increasing exercise.            Assessment and Plan      Diagnoses and all orders for this visit:    1. Annual  physical exam (Primary)  -     Comprehensive metabolic panel; Future  -     Hemoglobin A1c; Future    2. Hypertension, unspecified type  Comments:  uncontrolled htn-added Norvasc 5mg qd-gave pt bp log to record and bring back to f/u in 1m  Orders:  -     Comprehensive metabolic panel; Future  -     Discontinue: amLODIPine (Norvasc) 5 MG tablet; Take 1 tablet by mouth Daily.  Dispense: 30 tablet; Refill: 0  -     Discontinue: amLODIPine (Norvasc) 5 MG tablet; Take 1 tablet by mouth Daily.  Dispense: 30 tablet; Refill: 0    3. Allergy, subsequent encounter    4. Depression, unspecified depression type  -     busPIRone (BUSPAR) 15 MG tablet; Take 1 tablet by mouth 2 (Two) Times a Day.  Dispense: 180 tablet; Refill: 1    5. Primary hypertension  -     carvedilol (COREG) 3.125 MG tablet; Take 1 tablet by mouth 2 (Two) Times a Day With Meals.  Dispense: 180 tablet; Refill: 1  -     hydroCHLOROthiazide (HYDRODIURIL) 25 MG tablet; Take 1 tablet by mouth Daily.  Dispense: 90 tablet; Refill: 1  -     lisinopril (PRINIVIL,ZESTRIL) 40 MG tablet; Take 1 tablet by mouth Daily.  Dispense: 90 tablet; Refill: 1    6. Gastroesophageal reflux disease, unspecified whether esophagitis present  Comments:  controlled as long as she takes her PPI  Orders:  -     omeprazole (priLOSEC) 20 MG capsule; Take 1 capsule by mouth Daily.  Dispense: 90 capsule; Refill: 1    7. Screening mammogram for breast cancer  -     Mammo Screening Digital Tomosynthesis Bilateral With CAD; Future    8. Uncomplicated asthma, unspecified asthma severity, unspecified whether persistent  -     albuterol sulfate  (90 Base) MCG/ACT inhaler; Inhale 2 puffs Every 4 (Four) Hours As Needed for Wheezing.  Dispense: 18 g; Refill: 2    9. Seasonal allergies  Comments:  c/o sinus pressure /pain r/t allergies-sent in medrol chris  Orders:  -     Discontinue: methylPREDNISolone (MEDROL) 4 MG dose pack; Take as directed on package instructions.  Dispense: 21 each; Refill:  0  -     methylPREDNISolone (MEDROL) 4 MG dose pack; Take as directed on package instructions.  Dispense: 21 each; Refill: 0    10. Nasal sore  Comments:  prescribed mupriocin for nasal sore  Orders:  -     Discontinue: mupirocin (Bactroban) 2 % cream; Apply 1 application topically to the appropriate area as directed 3 (Three) Times a Day.  Dispense: 15 g; Refill: 0  -     Discontinue: mupirocin (Bactroban) 2 % cream; Apply 1 application topically to the appropriate area as directed 3 (Three) Times a Day.  Dispense: 15 g; Refill: 0         Follow Up     Return in about 4 weeks (around 6/1/2022).    Patient was given instructions and counseling regarding her condition or for health maintenance advice. Please see specific information pulled into the AVS if appropriate.     I have reviewed information obtained and documented by others and I have confirmed the accuracy of this documented note.    Preventative counseling-The patient is advised to begin progressive daily aerobic exercise program, reduce salt in diet and cooking, continue current medications, continue current healthy lifestyle patterns and return for routine annual checkups.

## 2022-05-04 NOTE — PATIENT INSTRUCTIONS
Blood Pressure Record Sheet  To take your blood pressure, you will need a blood pressure machine. You can buy a blood pressure machine (blood pressure monitor) at your clinic, drug store, or online. When choosing one, consider:  An automatic monitor that has an arm cuff.  A cuff that wraps snugly around your upper arm. You should be able to fit only one finger between your arm and the cuff.  A device that stores blood pressure reading results.  Do not choose a monitor that measures your blood pressure from your wrist or finger.  Follow your health care provider's instructions for how to take your blood pressure. To use this form:  Get one reading in the morning (a.m.) before you take any medicines.  Get one reading in the evening (p.m.) before supper.  Take at least 2 readings with each blood pressure check. This makes sure the results are correct. Wait 1-2 minutes between measurements.  Write down the results in the spaces on this form.  Repeat this once a week, or as told by your health care provider.  Make a follow-up appointment with your health care provider to discuss the results.  Blood pressure log  Date: _______________________  a.m. _____________________(1st reading) _____________________(2nd reading)  p.m. _____________________(1st reading) _____________________(2nd reading)  Date: _______________________  a.m. _____________________(1st reading) _____________________(2nd reading)  p.m. _____________________(1st reading) _____________________(2nd reading)  Date: _______________________  a.m. _____________________(1st reading) _____________________(2nd reading)  p.m. _____________________(1st reading) _____________________(2nd reading)  Date: _______________________  a.m. _____________________(1st reading) _____________________(2nd reading)  p.m. _____________________(1st reading) _____________________(2nd reading)  Date: _______________________  a.m. _____________________(1st reading)  _____________________(2nd reading)  p.m. _____________________(1st reading) _____________________(2nd reading)  This information is not intended to replace advice given to you by your health care provider. Make sure you discuss any questions you have with your health care provider.  Document Revised: 02/15/2019 Document Reviewed: 12/18/2018  Elsevier Patient Education © 2021 [employer]

## 2022-05-05 ENCOUNTER — LAB (OUTPATIENT)
Dept: LAB | Facility: HOSPITAL | Age: 67
End: 2022-05-05

## 2022-05-05 DIAGNOSIS — J34.89 NASAL SORE: ICD-10-CM

## 2022-05-05 DIAGNOSIS — I10 HYPERTENSION, UNSPECIFIED TYPE: ICD-10-CM

## 2022-05-05 DIAGNOSIS — Z00.00 ANNUAL PHYSICAL EXAM: ICD-10-CM

## 2022-05-05 LAB
ALBUMIN SERPL-MCNC: 4.4 G/DL (ref 3.5–5.2)
ALBUMIN/GLOB SERPL: 1.4 G/DL
ALP SERPL-CCNC: 120 U/L (ref 39–117)
ALT SERPL W P-5'-P-CCNC: 11 U/L (ref 1–33)
ANION GAP SERPL CALCULATED.3IONS-SCNC: 14.4 MMOL/L (ref 5–15)
AST SERPL-CCNC: 17 U/L (ref 1–32)
BILIRUB SERPL-MCNC: 0.4 MG/DL (ref 0–1.2)
BUN SERPL-MCNC: 19 MG/DL (ref 8–23)
BUN/CREAT SERPL: 14.1 (ref 7–25)
CALCIUM SPEC-SCNC: 8.8 MG/DL (ref 8.6–10.5)
CHLORIDE SERPL-SCNC: 105 MMOL/L (ref 98–107)
CO2 SERPL-SCNC: 23.6 MMOL/L (ref 22–29)
CREAT SERPL-MCNC: 1.35 MG/DL (ref 0.57–1)
EGFRCR SERPLBLD CKD-EPI 2021: 44 ML/MIN/1.73
GLOBULIN UR ELPH-MCNC: 3.1 GM/DL
GLUCOSE SERPL-MCNC: 96 MG/DL (ref 65–99)
HBA1C MFR BLD: 5 % (ref 4.8–5.6)
POTASSIUM SERPL-SCNC: 3.3 MMOL/L (ref 3.5–5.2)
PROT SERPL-MCNC: 7.5 G/DL (ref 6–8.5)
SODIUM SERPL-SCNC: 143 MMOL/L (ref 136–145)

## 2022-05-05 PROCEDURE — 80053 COMPREHEN METABOLIC PANEL: CPT

## 2022-05-05 PROCEDURE — 36415 COLL VENOUS BLD VENIPUNCTURE: CPT

## 2022-05-05 PROCEDURE — 83036 HEMOGLOBIN GLYCOSYLATED A1C: CPT

## 2022-05-10 ENCOUNTER — TELEPHONE (OUTPATIENT)
Dept: FAMILY MEDICINE CLINIC | Facility: CLINIC | Age: 67
End: 2022-05-10

## 2022-05-10 DIAGNOSIS — R74.8 ELEVATED ALKALINE PHOSPHATASE LEVEL: Primary | ICD-10-CM

## 2022-05-10 NOTE — TELEPHONE ENCOUNTER
----- Message from ARIN Olson sent at 5/9/2022  9:48 PM EDT -----  Cr elevated and GFR low-recommend increasing water intake and avoidance of NSAIDS. Alk phos just sl above normal-add alk phos isoenzymes and ggt. A1C WNL

## 2022-05-17 ENCOUNTER — TELEPHONE (OUTPATIENT)
Dept: FAMILY MEDICINE CLINIC | Facility: CLINIC | Age: 67
End: 2022-05-17

## 2022-05-27 ENCOUNTER — TELEPHONE (OUTPATIENT)
Dept: FAMILY MEDICINE CLINIC | Facility: CLINIC | Age: 67
End: 2022-05-27

## 2022-05-27 NOTE — TELEPHONE ENCOUNTER
Called and spoke w/pt she is going to d/c the Norvac and keep a bp log and pulse log this weekend and drop off on Tuesday

## 2022-05-27 NOTE — TELEPHONE ENCOUNTER
Caller: Loan Nam    Relationship: Self    Best call back number: [phone]    What medication are you requesting: SOMETHING FOR BLOOD PRESSURE     If a prescription is needed, what is your preferred pharmacy and phone number:  [facility]  [place] [place] - [address] AT [facility] [address] - [phone] [facility] [phone] FX    Additional notes: PATIENT STATED THAT SHE CAN NOT TAKE NORVAC DUE TO REACTIONS SHE HAS  WHILE ON TAKING THIS AND NEEDS A DIFFERENT MEDICATION FOR HER HIGH BLOOD PRESSURE PATIENT WOULD LEROY LIKE TO  A COPY  OF HER BIOMETRIC PAPER TODAY T ABOUT 2:30

## 2022-06-03 ENCOUNTER — TELEPHONE (OUTPATIENT)
Dept: FAMILY MEDICINE CLINIC | Facility: CLINIC | Age: 67
End: 2022-06-03

## 2022-06-03 NOTE — TELEPHONE ENCOUNTER
Caller: Loan Nam    Relationship to patient: Self    Best call back number: [phone]    Patient is needing: PATIENT CALLED STATING SHE IS NEEDING A CALL BACK TO SPEAK WITH THE NURSE REGARDING HER BLOOD PRESSURE NUMBERS SHE DROPPED OFF IN THE OFFICE ON 05/31/2022. PLEASE ADVISE THANK YOU.

## 2022-06-07 NOTE — TELEPHONE ENCOUNTER
Called pt left message-we are needing to increase her coreg to 6.25mg in the am and continue 3.125mg pm-have her keep a log and drop off for my review in 1 wk or she can send it through my chart which ever is easier for her. Have her monitor her pulse as well.

## 2022-06-14 ENCOUNTER — OFFICE VISIT (OUTPATIENT)
Dept: FAMILY MEDICINE CLINIC | Facility: CLINIC | Age: 67
End: 2022-06-14

## 2022-06-14 VITALS
HEART RATE: 92 BPM | WEIGHT: 227 LBS | SYSTOLIC BLOOD PRESSURE: 136 MMHG | OXYGEN SATURATION: 99 % | DIASTOLIC BLOOD PRESSURE: 66 MMHG | BODY MASS INDEX: 45.76 KG/M2 | HEIGHT: 59 IN

## 2022-06-14 DIAGNOSIS — I10 PRIMARY HYPERTENSION: ICD-10-CM

## 2022-06-14 PROCEDURE — 99213 OFFICE O/P EST LOW 20 MIN: CPT | Performed by: NURSE PRACTITIONER

## 2022-06-14 RX ORDER — CARVEDILOL 3.12 MG/1
TABLET ORAL
Qty: 270 TABLET | Refills: 1 | Status: SHIPPED | OUTPATIENT
Start: 2022-06-14 | End: 2022-11-08 | Stop reason: SDUPTHER

## 2022-06-14 NOTE — PROGRESS NOTES
"Chief Complaint  Hypertension (Pt states that her BP still high after changing in a week.)    Subjective          Loan Nam presents to [facility]  History of Present Illness  Loan Nam is a 66-year-old female who presents for a 1-month follow-up on hypertension.    Hypertension - The patient reports her blood pressure at home has been running high, with her systolic in the 150s mmHg. She states she has been checking it before she takes her medication. She states that she is no longer taking Norvasc because it was making her sick. The patient confirms she is taking Coreg mg 6.25 each morning, and Coreg 3.125 mg in the afternoon. The patient states her pulse at home has been running in the 80s.    Patient Care Team:  Kathie Andino APRN as PCP - General (Nurse Practitioner)   She  has a past medical history of Acid reflux, Allergic rhinitis, Anemia, Arthritis, Asthma, Bile salt-induced diarrhea, Depression, H/O bone density study, History of pulmonary embolism, Hypertension, Limb swelling, Moderate episode of recurrent major depressive disorder (HCC) (02/03/2020), Muscle cramp, nocturnal (07/17/2018), Pap smear for cervical cancer screening (2000), Renal insufficiency (04/12/2021), Seasonal allergies, SOB (shortness of breath), Venous insufficiency of left leg (07/17/2018), and Visit for screening mammogram (10/2019).     Objective   Vital Signs:   /66 (BP Location: Left arm, Patient Position: Sitting, Cuff Size: Large Adult)   Pulse 92   Ht 149.9 cm (59\")   Wt 103 kg (227 lb)   SpO2 99%   BMI 45.85 kg/m²     Physical Exam  Vitals reviewed.   Constitutional:       Appearance: Normal appearance. She is well-developed.   Neck:      Thyroid: No thyromegaly.      Vascular: No carotid bruit.   Cardiovascular:      Rate and Rhythm: Normal rate and regular rhythm.      Heart sounds: No murmur heard.    No friction rub. No gallop.   Pulmonary:      Effort: " Pulmonary effort is normal.      Breath sounds: Normal breath sounds. No wheezing or rhonchi.   Musculoskeletal:      Right lower leg: No edema.      Left lower leg: No edema.   Neurological:      Mental Status: She is alert and oriented to person, place, and time.      Cranial Nerves: No cranial nerve deficit.   Psychiatric:         Mood and Affect: Mood and affect normal.         Behavior: Behavior normal.         Thought Content: Thought content normal.         Judgment: Judgment normal.        Result Review :                 Past Surgical History:   Procedure Laterality Date   • APPENDECTOMY     •  SECTION     • CHOLECYSTECTOMY     • COLONOSCOPY  2014    normal   • GALLBLADDER SURGERY     • HYSTERECTOMY     • OTHER SURGICAL HISTORY      mass removal on neck   • PLANTAR FASCIA SURGERY Left    • TONSILLECTOMY        Family History   Problem Relation Age of Onset   • Hypertension Mother    • Cancer Mother    • Arthritis Mother    • Osteoporosis Mother    • Hypertension Father    • Cancer Father    • Other Father         blood diseases   • Hypertension Sister    • Cancer Sister    • Arthritis Sister         Current Outpatient Medications:   •  carvedilol (COREG) 3.125 MG tablet, Take 2 tabs PO in the am and one tab pm, Disp: 270 tablet, Rfl: 1  •  albuterol sulfate  (90 Base) MCG/ACT inhaler, Inhale 2 puffs Every 4 (Four) Hours As Needed for Wheezing., Disp: 18 g, Rfl: 2  •  busPIRone (BUSPAR) 15 MG tablet, Take 1 tablet by mouth 2 (Two) Times a Day., Disp: 180 tablet, Rfl: 1  •  hydroCHLOROthiazide (HYDRODIURIL) 25 MG tablet, Take 1 tablet by mouth Daily., Disp: 90 tablet, Rfl: 1  •  hyoscyamine (ANASPAZ,LEVSIN) 0.125 MG tablet, Levsin 0.125 mg oral tablet take 1 tablet (0.125 mg) by oral route 3 times per day   Suspended, Disp: , Rfl:   •  lisinopril (PRINIVIL,ZESTRIL) 40 MG tablet, Take 1 tablet by mouth Daily., Disp: 90 tablet, Rfl: 1  •  omeprazole (priLOSEC) 20 MG capsule,  Take 1 capsule by mouth Daily., Disp: 90 capsule, Rfl: 1   Assessment and Plan    Diagnoses and all orders for this visit:    1. Primary hypertension  Assessment & Plan:  - Continue Coreg 3.125 mg tablets, 2 each morning and 1 each evening.      Orders:  -     carvedilol (COREG) 3.125 MG tablet; Take 2 tabs PO in the am and one tab pm  Dispense: 270 tablet; Refill: 1    Follow Up   Return in about 3 months (around 9/14/2022).  Patient was given instructions and counseling regarding her condition or for health maintenance advice. Please see specific information pulled into the AVS if appropriate.     Loan Nam  reports that she has never smoked. She has never used smokeless tobacco..       The patient is advised to continue current medications.    Transcribed from ambient dictation for ARIN Olson by Fidelina Ocampo.  06/14/22   15:21 EDT    Patient verbalized consent to the visit recording.

## 2022-07-06 DIAGNOSIS — I10 HYPERTENSION, UNSPECIFIED TYPE: ICD-10-CM

## 2022-07-07 RX ORDER — AMLODIPINE BESYLATE 5 MG/1
TABLET ORAL
Qty: 30 TABLET | Refills: 0 | OUTPATIENT
Start: 2022-07-07

## 2022-10-03 ENCOUNTER — TELEPHONE (OUTPATIENT)
Dept: FAMILY MEDICINE CLINIC | Facility: CLINIC | Age: 67
End: 2022-10-03

## 2022-10-03 DIAGNOSIS — B37.9 CANDIDA INFECTION: Primary | ICD-10-CM

## 2022-10-03 RX ORDER — FLUCONAZOLE 150 MG/1
TABLET ORAL
Qty: 2 TABLET | Refills: 0 | Status: SHIPPED | OUTPATIENT
Start: 2022-10-03 | End: 2022-11-08

## 2022-10-03 NOTE — TELEPHONE ENCOUNTER
Caller: Loan Nam    Relationship: Self    Best call back number: [phone]    What medication are you requesting: DIFLUCAN    What are your current symptoms: YEAST INFECTION    How long have you been experiencing symptoms: 2 OR 3 DAYS    Have you had these symptoms before:    [x] Yes  [] No    Have you been treated for these symptoms before:   [x] Yes  [] No    If a prescription is needed, what is your preferred pharmacy and phone number: [facility] - [id number] - [phone] [facility] [phone] FX     Additional notes:PATIENT SAID THAT SHE WOULD LIKE TO BE PRESCRIBED TWO PILLS PLEASE AS THIS IS WHAT IT TAKES USUALLY TO CURE HER OF SYMPTOMS. SHE IS ALSO REQUESTING A CALL WHEN PRESCRIPTION IS SENT TO PHARMACY SO THAT SHE WILL KNOW TO PICK IT UP PLEASE.

## 2022-11-08 ENCOUNTER — OFFICE VISIT (OUTPATIENT)
Dept: FAMILY MEDICINE CLINIC | Facility: CLINIC | Age: 67
End: 2022-11-08

## 2022-11-08 VITALS
DIASTOLIC BLOOD PRESSURE: 74 MMHG | WEIGHT: 224.6 LBS | HEART RATE: 76 BPM | OXYGEN SATURATION: 99 % | SYSTOLIC BLOOD PRESSURE: 151 MMHG | HEIGHT: 59 IN | BODY MASS INDEX: 45.28 KG/M2

## 2022-11-08 DIAGNOSIS — Z12.31 ENCOUNTER FOR SCREENING MAMMOGRAM FOR MALIGNANT NEOPLASM OF BREAST: Primary | ICD-10-CM

## 2022-11-08 DIAGNOSIS — I10 PRIMARY HYPERTENSION: ICD-10-CM

## 2022-11-08 DIAGNOSIS — K21.9 GASTROESOPHAGEAL REFLUX DISEASE, UNSPECIFIED WHETHER ESOPHAGITIS PRESENT: ICD-10-CM

## 2022-11-08 DIAGNOSIS — F32.A DEPRESSION, UNSPECIFIED DEPRESSION TYPE: ICD-10-CM

## 2022-11-08 DIAGNOSIS — J45.909 UNCOMPLICATED ASTHMA, UNSPECIFIED ASTHMA SEVERITY, UNSPECIFIED WHETHER PERSISTENT: ICD-10-CM

## 2022-11-08 PROCEDURE — 99214 OFFICE O/P EST MOD 30 MIN: CPT | Performed by: NURSE PRACTITIONER

## 2022-11-08 RX ORDER — OMEPRAZOLE 20 MG/1
20 CAPSULE, DELAYED RELEASE ORAL DAILY
Qty: 90 CAPSULE | Refills: 1 | Status: SHIPPED | OUTPATIENT
Start: 2022-11-08 | End: 2023-01-17 | Stop reason: SDUPTHER

## 2022-11-08 RX ORDER — BUSPIRONE HYDROCHLORIDE 15 MG/1
15 TABLET ORAL 2 TIMES DAILY
Qty: 180 TABLET | Refills: 1 | Status: SHIPPED | OUTPATIENT
Start: 2022-11-08 | End: 2023-01-17 | Stop reason: SDUPTHER

## 2022-11-08 RX ORDER — ALBUTEROL SULFATE 90 UG/1
2 AEROSOL, METERED RESPIRATORY (INHALATION) EVERY 4 HOURS PRN
Qty: 18 G | Refills: 2 | Status: SHIPPED | OUTPATIENT
Start: 2022-11-08 | End: 2023-01-17

## 2022-11-08 RX ORDER — LISINOPRIL 40 MG/1
40 TABLET ORAL DAILY
Qty: 90 TABLET | Refills: 1 | Status: SHIPPED | OUTPATIENT
Start: 2022-11-08 | End: 2023-01-17 | Stop reason: SDUPTHER

## 2022-11-08 RX ORDER — HYDROCHLOROTHIAZIDE 25 MG/1
25 TABLET ORAL DAILY
Qty: 90 TABLET | Refills: 1 | Status: SHIPPED | OUTPATIENT
Start: 2022-11-08 | End: 2023-01-17

## 2022-11-08 RX ORDER — CARVEDILOL 3.12 MG/1
TABLET ORAL
Qty: 270 TABLET | Refills: 1 | Status: SHIPPED | OUTPATIENT
Start: 2022-11-08 | End: 2023-01-17 | Stop reason: SDUPTHER

## 2022-11-08 NOTE — PROGRESS NOTES
Chief Complaint  Asthma, Hypertension, Heartburn, and Depression    SUBJECTIVE  Loan Nam presents to [facility].     History of Present Illness       The patient presents today for a follow-up.    Hypertension - Her blood pressure is slightly elevated today at 157/75 mmHg. She states that her blood pressure fluctuates at home. She notes that her blood pressure was normal when she had a deep cleaning 3 months ago.    Health maintenance - She is due for a tetanus vaccine. She is due for a pneumonia vaccine. She is due for a bone density scan. She is due for a mammogram. She has received her COVID-19 vaccine and booster. She inquires about the new booster.    Arthritis - She reports that she has arthritis in her finger and wrist. She uses Tylenol and Voltaren gel.    Past Medical History:   Diagnosis Date   • Acid reflux    • Allergic rhinitis    • Anemia     unspecified   • Arthritis     7/28/2016 left knee   • Asthma    • Bile salt-induced diarrhea    • Depression    • H/O bone density study     never   • History of pulmonary embolism    • Hypertension    • Limb swelling    • Moderate episode of recurrent major depressive disorder (HCC) 02/03/2020   • Muscle cramp, nocturnal 07/17/2018   • Pap smear for cervical cancer screening 2000    no longer hysterectomy   • Renal insufficiency 04/12/2021   • Seasonal allergies    • SOB (shortness of breath)    • Venous insufficiency of left leg 07/17/2018    she needs to elevated nad wear compression hose. 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   • Visit for screening mammogram 10/2019    normal      Family History   Problem Relation Age of Onset   • Hypertension Mother    • Cancer Mother    • Arthritis Mother    • Osteoporosis Mother    • Hypertension Father    • Cancer Father    • Other Father         blood diseases   • Hypertension Sister    • Cancer Sister    • Arthritis Sister       Past Surgical History:   Procedure  "Laterality Date   • APPENDECTOMY     •  SECTION     • CHOLECYSTECTOMY     • COLONOSCOPY  2014    normal   • GALLBLADDER SURGERY     • HYSTERECTOMY     • OTHER SURGICAL HISTORY      mass removal on neck   • PLANTAR FASCIA SURGERY Left    • TONSILLECTOMY          Current Outpatient Medications:   •  albuterol sulfate  (90 Base) MCG/ACT inhaler, Inhale 2 puffs Every 4 (Four) Hours As Needed for Wheezing., Disp: 18 g, Rfl: 2  •  busPIRone (BUSPAR) 15 MG tablet, Take 1 tablet by mouth 2 (Two) Times a Day., Disp: 180 tablet, Rfl: 1  •  carvedilol (COREG) 3.125 MG tablet, Take 2 tabs PO in the am and one tab pm, Disp: 270 tablet, Rfl: 1  •  hydroCHLOROthiazide (HYDRODIURIL) 25 MG tablet, Take 1 tablet by mouth Daily., Disp: 90 tablet, Rfl: 1  •  lisinopril (PRINIVIL,ZESTRIL) 40 MG tablet, Take 1 tablet by mouth Daily., Disp: 90 tablet, Rfl: 1  •  omeprazole (priLOSEC) 20 MG capsule, Take 1 capsule by mouth Daily., Disp: 90 capsule, Rfl: 1    OBJECTIVE  Vital Signs:   /74 (BP Location: Left arm, Patient Position: Sitting, Cuff Size: Adult)   Pulse 76   Ht 149.9 cm (59\")   Wt 102 kg (224 lb 9.6 oz)   SpO2 99%   BMI 45.36 kg/m²    Estimated body mass index is 45.36 kg/m² as calculated from the following:    Height as of this encounter: 149.9 cm (59\").    Weight as of this encounter: 102 kg (224 lb 9.6 oz).     Wt Readings from Last 3 Encounters:   22 102 kg (224 lb 9.6 oz)   22 103 kg (227 lb)   22 103 kg (227 lb)     BP Readings from Last 3 Encounters:   22 151/74   22 136/66   22 153/66             Physical Exam  Vitals reviewed.   Constitutional:       General: She is not in acute distress.     Appearance: Normal appearance. She is well-developed.   HENT:      Head: Normocephalic and atraumatic.   Eyes:      Conjunctiva/sclera: Conjunctivae normal.      Pupils: Pupils are equal, round, and reactive to light.   Cardiovascular:      Rate and " Rhythm: Normal rate and regular rhythm.      Heart sounds: Normal heart sounds. No murmur heard.  Pulmonary:      Effort: Pulmonary effort is normal. No respiratory distress.      Breath sounds: Normal breath sounds.   Skin:     General: Skin is warm and dry.   Neurological:      Mental Status: She is alert and oriented to person, place, and time.   Psychiatric:         Mood and Affect: Mood and affect normal.         Behavior: Behavior normal.         Thought Content: Thought content normal.         Judgment: Judgment normal.          Result Review    CMP    CMP 5/5/22   Glucose 96   BUN 19   Creatinine 1.35 (A)   Sodium 143   Potassium 3.3 (A)   Chloride 105   Calcium 8.8   Albumin 4.40   Total Bilirubin 0.4   Alkaline Phosphatase 120 (A)   AST (SGOT) 17   ALT (SGPT) 11   (A) Abnormal value                  Lipid Panel    Lipid Panel 3/26/22   Total Cholesterol 150   Triglycerides 125   HDL Cholesterol 33 (A)   VLDL Cholesterol 23   LDL Cholesterol  94   LDL/HDL Ratio 2.79   (A) Abnormal value            TSH    TSH 3/26/22   TSH 2.970             No Images in the past 120 days found..     The above data has been reviewed by ARIN Olson  11/08/2022 09:59 EST.          Patient Care Team:  Kathie Andino APRN as PCP - General (Nurse Practitioner)           ASSESSMENT & PLAN    Diagnoses and all orders for this visit:    1. Encounter for screening mammogram for malignant neoplasm of breast (Primary)  Comments:  - She is due for a mammogram.  Orders:  -     Mammo Screening Digital Tomosynthesis Bilateral With CAD; Future    2. Uncomplicated asthma, unspecified asthma severity, unspecified whether persistent  -     albuterol sulfate  (90 Base) MCG/ACT inhaler; Inhale 2 puffs Every 4 (Four) Hours As Needed for Wheezing.  Dispense: 18 g; Refill: 2    3. Gastroesophageal reflux disease, unspecified whether esophagitis present  Comments:  controlled as long as she takes her PPI  Orders:  -      omeprazole (priLOSEC) 20 MG capsule; Take 1 capsule by mouth Daily.  Dispense: 90 capsule; Refill: 1    4. Primary hypertension  Assessment & Plan:  - She will keep a log of her blood pressure readings and drop it off in a couple of weeks.    Orders:  -     lisinopril (PRINIVIL,ZESTRIL) 40 MG tablet; Take 1 tablet by mouth Daily.  Dispense: 90 tablet; Refill: 1  -     hydroCHLOROthiazide (HYDRODIURIL) 25 MG tablet; Take 1 tablet by mouth Daily.  Dispense: 90 tablet; Refill: 1  -     carvedilol (COREG) 3.125 MG tablet; Take 2 tabs PO in the am and one tab pm  Dispense: 270 tablet; Refill: 1    5. Depression, unspecified depression type  -     busPIRone (BUSPAR) 15 MG tablet; Take 1 tablet by mouth 2 (Two) Times a Day.  Dispense: 180 tablet; Refill: 1       Tobacco Use: Low Risk    • Smoking Tobacco Use: Never   • Smokeless Tobacco Use: Never   • Passive Exposure: Not on file        Health maintenance  - She is due for a bone density scan.  - She is due for a mammogram.  - She is due for a pneumonia vaccine.  - She is due for a shingles vaccine.  - She is due for a tetanus vaccine.  -She will return to the clinic in 7 months.    Follow Up     Return in about 7 months (around 6/8/2023).      Patient was given instructions and counseling regarding her condition or for health maintenance advice. Please see specific information pulled into the AVS if appropriate.   I have reviewed information obtained and documented by others and I have confirmed the accuracy of this documented note.    ARIN Olson       Transcribed from ambient dictation for ARIN Olson by Joyce Cheung.  11/08/22   12:10 EST    Patient or patient representative verbalized consent to the visit recording.  I have personally performed the services described in this document as transcribed by the above individual, and it is both accurate and complete.

## 2022-11-25 PROCEDURE — U0004 COV-19 TEST NON-CDC HGH THRU: HCPCS

## 2022-11-26 ENCOUNTER — TELEPHONE (OUTPATIENT)
Dept: URGENT CARE | Facility: CLINIC | Age: 67
End: 2022-11-26

## 2022-11-29 ENCOUNTER — TELEPHONE (OUTPATIENT)
Dept: ORTHOPEDIC SURGERY | Facility: CLINIC | Age: 67
End: 2022-11-29

## 2022-11-29 NOTE — TELEPHONE ENCOUNTER
Caller: Loan Nam    Relationship to patient: Self    Best call back number: [phone]    Chief complaint: BILAT KNEE     Type of visit: FUP INJECTION

## 2022-12-09 ENCOUNTER — OFFICE VISIT (OUTPATIENT)
Dept: ORTHOPEDIC SURGERY | Facility: CLINIC | Age: 67
End: 2022-12-09

## 2022-12-09 VITALS — HEIGHT: 59 IN | WEIGHT: 225 LBS | BODY MASS INDEX: 45.36 KG/M2

## 2022-12-09 DIAGNOSIS — M17.0 BILATERAL PRIMARY OSTEOARTHRITIS OF KNEE: Primary | ICD-10-CM

## 2022-12-09 PROCEDURE — 20610 DRAIN/INJ JOINT/BURSA W/O US: CPT | Performed by: PHYSICIAN ASSISTANT

## 2022-12-09 RX ORDER — LIDOCAINE HYDROCHLORIDE 10 MG/ML
5 INJECTION, SOLUTION INFILTRATION; PERINEURAL
Status: COMPLETED | OUTPATIENT
Start: 2022-12-09 | End: 2022-12-09

## 2022-12-09 RX ORDER — NITROFURANTOIN 25; 75 MG/1; MG/1
100 CAPSULE ORAL
COMMUNITY
Start: 2022-12-05 | End: 2022-12-12

## 2022-12-09 RX ORDER — METHYLPREDNISOLONE ACETATE 80 MG/ML
80 INJECTION, SUSPENSION INTRA-ARTICULAR; INTRALESIONAL; INTRAMUSCULAR; SOFT TISSUE
Status: COMPLETED | OUTPATIENT
Start: 2022-12-09 | End: 2022-12-09

## 2022-12-09 RX ADMIN — METHYLPREDNISOLONE ACETATE 80 MG: 80 INJECTION, SUSPENSION INTRA-ARTICULAR; INTRALESIONAL; INTRAMUSCULAR; SOFT TISSUE at 14:46

## 2022-12-09 RX ADMIN — LIDOCAINE HYDROCHLORIDE 5 ML: 10 INJECTION, SOLUTION INFILTRATION; PERINEURAL at 14:46

## 2022-12-09 RX ADMIN — METHYLPREDNISOLONE ACETATE 80 MG: 80 INJECTION, SUSPENSION INTRA-ARTICULAR; INTRALESIONAL; INTRAMUSCULAR; SOFT TISSUE at 14:47

## 2022-12-09 RX ADMIN — LIDOCAINE HYDROCHLORIDE 5 ML: 10 INJECTION, SOLUTION INFILTRATION; PERINEURAL at 14:47

## 2022-12-09 NOTE — PROGRESS NOTES
"Chief Complaint  Follow-up of the Right Knee and Follow-up of the Left Knee    Subjective      Loan Nam presents to [facility] for follow-up of bilateral knee pain and osteoarthritis, which she has managed conservatively with intermittent injections in the past.  She was last seen in office on 1/3/2022 and received bilateral knee steroid injections, which she reports has done fairly well until recently.  Reports increased pain with cold weather change.  She presents today requesting repeat bilateral steroid injections.    Objective   Allergies   Allergen Reactions   • Contrast Dye Hives   • Iodinated Diagnostic Agents Hives   • Norvasc [Amlodipine] Shortness Of Breath   • Penicillins Swelling   • Sulfa Antibiotics Rash       Vital Signs:   Ht 149.9 cm (59\")   Wt 102 kg (225 lb)   BMI 45.44 kg/m²       Physical Exam    Constitutional: Awake, alert. Well nourished appearance.    Integumentary: Warm, dry, intact. No obvious rashes.    HENT: Atraumatic, normocephalic.   Respiratory: Non labored respirations .   Cardiovascular: Intact peripheral pulses.    Psychiatric: Normal mood and affect. A&O X3    Ortho Exam  Bilateral knees: Moderate edema.  Significant bilateral lower extremity varicosities noted.  Tenderness to palpation of medial and lateral joint lines.  Crepitus with ROM.  -2 to -5 degrees of knee extension and knee flexion to 115 degrees bilaterally.  Full plantarflexion and dorsiflexion of the ankles.  Sensation intact to light touch.  Distal neurovascular intact.  Patient is fully weightbearing with nonantalgic gait.    Imaging Results (Most Recent)     None           Large Joint Arthrocentesis  Date/Time: 12/9/2022 2:46 PM  Consent given by: patient  Site marked: site marked  Timeout: Immediately prior to procedure a time out was called to verify the correct patient, procedure, equipment, support staff and site/side marked as required   Supporting " Documentation  Indications: pain   Procedure Details  Location: RIGHT KNEE   Needle gauge: 21G.  Medications administered: 5 mL lidocaine 1 %; 80 mg methylPREDNISolone acetate 80 MG/ML  Patient tolerance: patient tolerated the procedure well with no immediate complications    Large Joint Arthrocentesis  Date/Time: 12/9/2022 2:47 PM  Consent given by: patient  Site marked: site marked  Timeout: Immediately prior to procedure a time out was called to verify the correct patient, procedure, equipment, support staff and site/side marked as required   Supporting Documentation  Indications: pain   Procedure Details  Location: LEFT KNEE.  Needle gauge: 21G.  Medications administered: 5 mL lidocaine 1 %; 80 mg methylPREDNISolone acetate 80 MG/ML  Patient tolerance: patient tolerated the procedure well with no immediate complications              Assessment and Plan   Problem List Items Addressed This Visit    None  Visit Diagnoses     Bilateral primary osteoarthritis of knee    -  Primary        Follow Up   Return if symptoms worsen or fail to improve.  Educated on risk of smoking. Discussed options for smoking cessation.    Patient Instructions   Bilateral knee steroid injections administered in office today.  Patient advised on 3 months duration between injections.  Could consider viscosupplementation in the future, if needed.  Patient would require insurance prior authorization.  Can ice knees as needed for discomfort today.    Follow-up as needed.  Call with changes or concerns.    Patient was given instructions and counseling regarding her condition or for health maintenance advice. Please see specific information pulled into the AVS if appropriate.

## 2022-12-09 NOTE — PROGRESS NOTES
"Chief Complaint  Follow-up of the Right Knee and Follow-up of the Left Knee    Subjective      Loan Nam presents to [facility] for     Objective   Allergies   Allergen Reactions   • Contrast Dye Hives   • Iodinated Diagnostic Agents Hives   • Norvasc [Amlodipine] Shortness Of Breath   • Penicillins Swelling   • Sulfa Antibiotics Rash       Vital Signs:   Ht 149.9 cm (59\")   Wt 102 kg (225 lb)   BMI 45.44 kg/m²       Physical Exam    Constitutional: Awake, alert. Well nourished appearance.    Integumentary: Warm, dry, intact. No obvious rashes.    HENT: Atraumatic, normocephalic.   Respiratory: Non labored respirations .   Cardiovascular: Intact peripheral pulses.    Psychiatric: Normal mood and affect. A&O X3    Ortho Exam  ***    Imaging Results (Most Recent)     None                    Assessment and Plan   Problem List Items Addressed This Visit    None  Visit Diagnoses     Bilateral primary osteoarthritis of knee    -  Primary          Follow Up   No follow-ups on file.  Educated on risk of smoking. Discussed options for smoking cessation.    There are no Patient Instructions on file for this visit.  Patient was given instructions and counseling regarding her condition or for health maintenance advice. Please see specific information pulled into the AVS if appropriate.        "

## 2022-12-09 NOTE — PATIENT INSTRUCTIONS
Bilateral knee steroid injections administered in office today.  Patient advised on 3 months duration between injections.  Could consider viscosupplementation in the future, if needed.  Patient would require insurance prior authorization.  Can ice knees as needed for discomfort today.    Follow-up as needed.  Call with changes or concerns.

## 2023-01-17 ENCOUNTER — OFFICE VISIT (OUTPATIENT)
Dept: FAMILY MEDICINE CLINIC | Facility: CLINIC | Age: 68
End: 2023-01-17
Payer: COMMERCIAL

## 2023-01-17 VITALS
HEART RATE: 98 BPM | OXYGEN SATURATION: 98 % | SYSTOLIC BLOOD PRESSURE: 157 MMHG | DIASTOLIC BLOOD PRESSURE: 94 MMHG | HEIGHT: 59 IN | BODY MASS INDEX: 44.55 KG/M2 | WEIGHT: 221 LBS

## 2023-01-17 DIAGNOSIS — F32.A DEPRESSION, UNSPECIFIED DEPRESSION TYPE: ICD-10-CM

## 2023-01-17 DIAGNOSIS — Z13.6 ENCOUNTER FOR LIPID SCREENING FOR CARDIOVASCULAR DISEASE: ICD-10-CM

## 2023-01-17 DIAGNOSIS — Z13.220 ENCOUNTER FOR LIPID SCREENING FOR CARDIOVASCULAR DISEASE: ICD-10-CM

## 2023-01-17 DIAGNOSIS — Z13.29 SCREENING FOR THYROID DISORDER: ICD-10-CM

## 2023-01-17 DIAGNOSIS — Z11.59 ENCOUNTER FOR HEPATITIS C SCREENING TEST FOR LOW RISK PATIENT: ICD-10-CM

## 2023-01-17 DIAGNOSIS — J45.909 UNCOMPLICATED ASTHMA, UNSPECIFIED ASTHMA SEVERITY, UNSPECIFIED WHETHER PERSISTENT: ICD-10-CM

## 2023-01-17 DIAGNOSIS — I10 ESSENTIAL (PRIMARY) HYPERTENSION: ICD-10-CM

## 2023-01-17 DIAGNOSIS — I10 PRIMARY HYPERTENSION: Primary | ICD-10-CM

## 2023-01-17 DIAGNOSIS — K21.9 GASTROESOPHAGEAL REFLUX DISEASE, UNSPECIFIED WHETHER ESOPHAGITIS PRESENT: ICD-10-CM

## 2023-01-17 PROCEDURE — 99214 OFFICE O/P EST MOD 30 MIN: CPT

## 2023-01-17 RX ORDER — LISINOPRIL 40 MG/1
40 TABLET ORAL DAILY
Qty: 90 TABLET | Refills: 2 | Status: SHIPPED | OUTPATIENT
Start: 2023-01-17 | End: 2023-01-17

## 2023-01-17 RX ORDER — HYDROCHLOROTHIAZIDE 25 MG/1
25 TABLET ORAL DAILY
Qty: 30 TABLET | Refills: 6 | Status: SHIPPED | OUTPATIENT
Start: 2023-01-17 | End: 2023-08-15

## 2023-01-17 RX ORDER — BUSPIRONE HYDROCHLORIDE 15 MG/1
15 TABLET ORAL 2 TIMES DAILY
Qty: 180 TABLET | Refills: 2 | Status: SHIPPED | OUTPATIENT
Start: 2023-01-17 | End: 2023-01-17

## 2023-01-17 RX ORDER — HYDROCHLOROTHIAZIDE 25 MG/1
25 TABLET ORAL DAILY
Qty: 30 TABLET | Refills: 6 | Status: SHIPPED | OUTPATIENT
Start: 2023-01-17 | End: 2023-01-17

## 2023-01-17 RX ORDER — LOSARTAN POTASSIUM 50 MG/1
100 TABLET ORAL DAILY
Qty: 60 TABLET | Refills: 6 | Status: SHIPPED | OUTPATIENT
Start: 2023-01-17 | End: 2023-08-15

## 2023-01-17 RX ORDER — ALBUTEROL SULFATE 90 UG/1
2 AEROSOL, METERED RESPIRATORY (INHALATION) EVERY 4 HOURS PRN
Qty: 18 G | Refills: 2 | Status: SHIPPED | OUTPATIENT
Start: 2023-01-17

## 2023-01-17 RX ORDER — BUSPIRONE HYDROCHLORIDE 15 MG/1
15 TABLET ORAL 2 TIMES DAILY
Qty: 180 TABLET | Refills: 2 | Status: SHIPPED | OUTPATIENT
Start: 2023-01-17

## 2023-01-17 RX ORDER — CARVEDILOL 3.12 MG/1
TABLET ORAL
Qty: 270 TABLET | Refills: 2 | Status: SHIPPED | OUTPATIENT
Start: 2023-01-17 | End: 2023-01-17

## 2023-01-17 RX ORDER — LOSARTAN POTASSIUM 50 MG/1
50 TABLET ORAL DAILY
Qty: 30 TABLET | Refills: 6 | Status: SHIPPED | OUTPATIENT
Start: 2023-01-17 | End: 2023-01-17

## 2023-01-17 RX ORDER — CARVEDILOL 3.12 MG/1
TABLET ORAL
Qty: 270 TABLET | Refills: 2 | Status: SHIPPED | OUTPATIENT
Start: 2023-01-17

## 2023-01-17 RX ORDER — FLUTICASONE PROPIONATE 50 MCG
2 SPRAY, SUSPENSION (ML) NASAL DAILY
Qty: 16 G | Refills: 1 | Status: SHIPPED | OUTPATIENT
Start: 2023-01-17 | End: 2023-01-17

## 2023-01-17 RX ORDER — OMEPRAZOLE 20 MG/1
20 CAPSULE, DELAYED RELEASE ORAL DAILY
Qty: 90 CAPSULE | Refills: 2 | Status: SHIPPED | OUTPATIENT
Start: 2023-01-17

## 2023-01-17 RX ORDER — OMEPRAZOLE 20 MG/1
20 CAPSULE, DELAYED RELEASE ORAL DAILY
Qty: 90 CAPSULE | Refills: 2 | Status: SHIPPED | OUTPATIENT
Start: 2023-01-17 | End: 2023-01-17

## 2023-01-17 RX ORDER — HYDROCHLOROTHIAZIDE 25 MG/1
50 TABLET ORAL DAILY
Qty: 90 TABLET | Refills: 2 | Status: SHIPPED | OUTPATIENT
Start: 2023-01-17 | End: 2023-01-17

## 2023-01-17 NOTE — PROGRESS NOTES
Chief Complaint  Establish Care and Hypertension    SUBJECTIVE  Loan Nam presents to [facility]    History of Present Illness  67-year-old Loan Nam presents today to establish care.  Patient is a former patient of Kathie Andino.      Hypertension: Patient presents today stating that she has noticed her blood pressure has been elevated.  On today's visit is 157/94.  She is currently on lisinopril 40 mg, carvedilol 3.125 twice daily, and hydrochlorothiazide 25 mg.  Patient's last GFR was 44 on May 5, 2022. Patient's last potassium level was 3.3. We discussed increasing foods in diet that contained potassium.       Neck pain: Patient is also complaining about neck pain.  She has been using over-the-counter Tylenol and applying heat to the area.    Depression and anxiety: Patient is doing well on BuSpar 15 mg tablet.  No changes indicated at this time.      Patient states that she wants to wait until October to discuss DEXA scan.  She will also have blood work done in October and will schedule a Medicare annual wellness for October.    Past Medical History:   Diagnosis Date   • Acid reflux    • Allergic rhinitis    • Anemia     unspecified   • Arthritis     7/28/2016 left knee   • Asthma    • Bile salt-induced diarrhea    • Depression    • H/O bone density study     never   • History of pulmonary embolism    • Hypertension    • Limb swelling    • Moderate episode of recurrent major depressive disorder (HCC) 02/03/2020   • Muscle cramp, nocturnal 07/17/2018   • Pap smear for cervical cancer screening 2000    no longer hysterectomy   • Renal insufficiency 04/12/2021   • Seasonal allergies    • SOB (shortness of breath)    • Venous insufficiency of left leg 07/17/2018    she needs to elevated nad wear compression hose. 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   • Visit for screening mammogram 10/2019    normal      Family History   Problem Relation Age of Onset  "  • Hypertension Mother    • Cancer Mother    • Arthritis Mother    • Osteoporosis Mother    • Hypertension Father    • Cancer Father    • Other Father         blood diseases   • Hypertension Sister    • Cancer Sister    • Arthritis Sister       Past Surgical History:   Procedure Laterality Date   • APPENDECTOMY     •  SECTION     • CHOLECYSTECTOMY     • COLONOSCOPY  2014    normal   • GALLBLADDER SURGERY     • HYSTERECTOMY     • OTHER SURGICAL HISTORY      mass removal on neck   • PLANTAR FASCIA SURGERY Left    • TONSILLECTOMY          Current Outpatient Medications:   •  albuterol sulfate  (90 Base) MCG/ACT inhaler, Inhale 2 puffs Every 4 (Four) Hours As Needed for Wheezing., Disp: 18 g, Rfl: 2  •  busPIRone (BUSPAR) 15 MG tablet, Take 1 tablet by mouth 2 (Two) Times a Day., Disp: 180 tablet, Rfl: 2  •  carvedilol (COREG) 3.125 MG tablet, Take 2 tabs PO in the am and one tab pm, Disp: 270 tablet, Rfl: 2  •  hydroCHLOROthiazide (HYDRODIURIL) 25 MG tablet, Take 1 tablet by mouth Daily for 210 days., Disp: 30 tablet, Rfl: 6  •  losartan (Cozaar) 50 MG tablet, Take 2 tablets by mouth Daily for 210 days., Disp: 60 tablet, Rfl: 6  •  omeprazole (priLOSEC) 20 MG capsule, Take 1 capsule by mouth Daily., Disp: 90 capsule, Rfl: 2    OBJECTIVE  Vital Signs:   /94   Pulse 98   Ht 149.9 cm (59\")   Wt 100 kg (221 lb)   SpO2 98%   BMI 44.64 kg/m²    Estimated body mass index is 44.64 kg/m² as calculated from the following:    Height as of this encounter: 149.9 cm (59\").    Weight as of this encounter: 100 kg (221 lb).     Wt Readings from Last 3 Encounters:   23 100 kg (221 lb)   22 102 kg (225 lb)   22 102 kg (225 lb)     BP Readings from Last 3 Encounters:   23 157/94   22 (!) 172/110   22 151/74       Physical Exam  Vitals and nursing note reviewed.   Constitutional:       Appearance: Normal appearance.   HENT:      Head: Normocephalic and " atraumatic.   Eyes:      Conjunctiva/sclera: Conjunctivae normal.      Pupils: Pupils are equal, round, and reactive to light.   Cardiovascular:      Rate and Rhythm: Normal rate and regular rhythm.      Pulses: Normal pulses.      Heart sounds: Normal heart sounds.   Pulmonary:      Effort: Pulmonary effort is normal.      Breath sounds: Normal breath sounds.   Abdominal:      General: Abdomen is flat.      Palpations: Abdomen is soft.   Musculoskeletal:         General: Normal range of motion.      Cervical back: Normal range of motion and neck supple.      Comments: Patient does state that she has some neck pain on movement.  We discussed that there was over-the-counter and advised that she can try on the area.  She will also continue to heat and use Tylenol.   Skin:     General: Skin is warm and dry.   Neurological:      General: No focal deficit present.      Mental Status: She is alert and oriented to person, place, and time. Mental status is at baseline.   Psychiatric:         Mood and Affect: Mood normal.         Behavior: Behavior normal.         Thought Content: Thought content normal.         Judgment: Judgment normal.          Result Review    Common labs    Common Labs 3/26/22 5/5/22 5/5/22     1445 1445   Glucose   96   BUN   19   Creatinine   1.35 (A)   Sodium   143   Potassium   3.3 (A)   Chloride   105   Calcium   8.8   Albumin   4.40   Total Bilirubin   0.4   Alkaline Phosphatase   120 (A)   AST (SGOT)   17   ALT (SGPT)   11   Total Cholesterol 150     Triglycerides 125     HDL Cholesterol 33 (A)     LDL Cholesterol  94     Hemoglobin A1C  5.00    (A) Abnormal value            CMP    CMP 5/5/22   Glucose 96   BUN 19   Creatinine 1.35 (A)   eGFR 44.0 (A)   Sodium 143   Potassium 3.3 (A)   Chloride 105   Calcium 8.8   Total Protein 7.5   Albumin 4.40   Globulin 3.1   Total Bilirubin 0.4   Alkaline Phosphatase 120 (A)   AST (SGOT) 17   ALT (SGPT) 11   Albumin/Globulin Ratio 1.4   BUN/Creatinine Ratio  14.1   Anion Gap 14.4   (A) Abnormal value       Comments are available for some flowsheets but are not being displayed.                 Lipid Panel    Lipid Panel 3/26/22   Total Cholesterol 150   Triglycerides 125   HDL Cholesterol 33 (A)   VLDL Cholesterol 23   LDL Cholesterol  94   LDL/HDL Ratio 2.79   (A) Abnormal value            TSH    TSH 3/26/22   TSH 2.970           Electrolytes    Electrolytes 5/5/22   Sodium 143   Potassium 3.3 (A)   Chloride 105   Calcium 8.8   (A) Abnormal value            Renal Profile    Renal Profile 5/5/22   BUN 19   Creatinine 1.35 (A)   (A) Abnormal value            BMP    BMP 5/5/22   BUN 19   Creatinine 1.35 (A)   Sodium 143   Potassium 3.3 (A)   Chloride 105   CO2 23.6   Calcium 8.8   (A) Abnormal value            Most Recent A1C    HGBA1C Most Recent 5/5/22   Hemoglobin A1C 5.00             No Images in the past 120 days found..      The above data has been reviewed by ARIN Calvert 01/17/2023 15:14 EST.          Patient Care Team:  Kathie Andino APRN as PCP - General (Nurse Practitioner)        ASSESSMENT & PLAN    Diagnoses and all orders for this visit:    1. Primary hypertension (Primary)  Comments:  Have changed patient from lisinopril to losartan 100 mg. Informed her that her potassium had been low severeal times.   Orders:  -     Discontinue: carvedilol (COREG) 3.125 MG tablet; Take 2 tabs PO in the am and one tab pm  Dispense: 270 tablet; Refill: 2  -     Discontinue: hydroCHLOROthiazide (HYDRODIURIL) 25 MG tablet; Take 2 tablets by mouth Daily.  Dispense: 90 tablet; Refill: 2  -     Discontinue: lisinopril (PRINIVIL,ZESTRIL) 40 MG tablet; Take 1 tablet by mouth Daily.  Dispense: 90 tablet; Refill: 2  -     carvedilol (COREG) 3.125 MG tablet; Take 2 tabs PO in the am and one tab pm  Dispense: 270 tablet; Refill: 2  -     hydroCHLOROthiazide (HYDRODIURIL) 25 MG tablet; Take 1 tablet by mouth Daily for 210 days.  Dispense: 30 tablet; Refill: 6    2.  Essential (primary) hypertension  -     Comprehensive metabolic panel; Future  -     carvedilol (COREG) 3.125 MG tablet; Take 2 tabs PO in the am and one tab pm  Dispense: 270 tablet; Refill: 2  -     hydroCHLOROthiazide (HYDRODIURIL) 25 MG tablet; Take 1 tablet by mouth Daily for 210 days.  Dispense: 30 tablet; Refill: 6    3. Encounter for hepatitis C screening test for low risk patient  Comments:  Patient will have labs drawn in October with labs for any wellness.  Orders:  -     Hepatitis C antibody; Future    4. Screening for thyroid disorder  Comments:  Patient will have labs drawn in October with labs for annual wellness.  Orders:  -     TSH; Future    5. Encounter for lipid screening for cardiovascular disease  Comments:  Patient will have labs drawn in October with labs for annual wellness.  Orders:  -     Lipid panel; Future    6. Depression, unspecified depression type  Comments:  No changes in medicines at this time have reordered BuSpar.  Orders:  -     Discontinue: busPIRone (BUSPAR) 15 MG tablet; Take 1 tablet by mouth 2 (Two) Times a Day.  Dispense: 180 tablet; Refill: 2  -     busPIRone (BUSPAR) 15 MG tablet; Take 1 tablet by mouth 2 (Two) Times a Day.  Dispense: 180 tablet; Refill: 2    7. Gastroesophageal reflux disease, unspecified whether esophagitis present  Comments:  controlled as long as she takes her PPI  Orders:  -     Discontinue: omeprazole (priLOSEC) 20 MG capsule; Take 1 capsule by mouth Daily.  Dispense: 90 capsule; Refill: 2  -     omeprazole (priLOSEC) 20 MG capsule; Take 1 capsule by mouth Daily.  Dispense: 90 capsule; Refill: 2    8. Uncomplicated asthma, unspecified asthma severity, unspecified whether persistent  Comments:  have reordered her inhaler to be sent to pharmacy.   Orders:  -     albuterol sulfate  (90 Base) MCG/ACT inhaler; Inhale 2 puffs Every 4 (Four) Hours As Needed for Wheezing.  Dispense: 18 g; Refill: 2    Other orders  -     Discontinue: fluticasone  (FLONASE) 50 MCG/ACT nasal spray; 2 sprays into the nostril(s) as directed by provider Daily.  Dispense: 16 g; Refill: 1  -     Discontinue: hydroCHLOROthiazide (HYDRODIURIL) 25 MG tablet; Take 1 tablet by mouth Daily for 210 days.  Dispense: 30 tablet; Refill: 6  -     Discontinue: losartan (Cozaar) 50 MG tablet; Take 1 tablet by mouth Daily for 210 days.  Dispense: 30 tablet; Refill: 6  -     losartan (Cozaar) 50 MG tablet; Take 2 tablets by mouth Daily for 210 days.  Dispense: 60 tablet; Refill: 6         Tobacco Use: Low Risk    • Smoking Tobacco Use: Never   • Smokeless Tobacco Use: Never   • Passive Exposure: Never       Follow Up     Return in about 9 months (around 10/17/2023) for Annual physical, Medicare Wellness.      Patient was given instructions and counseling regarding her condition or for health maintenance advice. Please see specific information pulled into the AVS if appropriate.   I have reviewed information obtained and documented by others and I have confirmed the accuracy of this documented note.    ARIN Calvert

## 2023-04-03 PROCEDURE — 87086 URINE CULTURE/COLONY COUNT: CPT | Performed by: NURSE PRACTITIONER

## 2023-04-05 ENCOUNTER — TELEPHONE (OUTPATIENT)
Dept: URGENT CARE | Facility: CLINIC | Age: 68
End: 2023-04-05
Payer: COMMERCIAL

## 2023-04-05 NOTE — TELEPHONE ENCOUNTER
----- Message from John Calvin Cooksey, PA-C sent at 4/4/2023 10:43 PM EDT -----  Please call the patient regarding her urine culture, which showed no growth.  This means there is no bacterial cause to something such as urinary tract infection causing her symptoms.  She may follow-up with her primary care provider if her symptoms are persisting, or worsening.     Thank you,     -John Cooksey, PA-C

## 2023-04-06 ENCOUNTER — TELEPHONE (OUTPATIENT)
Dept: URGENT CARE | Facility: CLINIC | Age: 68
End: 2023-04-06
Payer: COMMERCIAL

## 2023-08-01 ENCOUNTER — TELEPHONE (OUTPATIENT)
Dept: FAMILY MEDICINE CLINIC | Facility: CLINIC | Age: 68
End: 2023-08-01

## 2023-08-01 ENCOUNTER — OFFICE VISIT (OUTPATIENT)
Dept: FAMILY MEDICINE CLINIC | Facility: CLINIC | Age: 68
End: 2023-08-01
Payer: COMMERCIAL

## 2023-08-01 VITALS
WEIGHT: 238 LBS | OXYGEN SATURATION: 100 % | BODY MASS INDEX: 35.25 KG/M2 | RESPIRATION RATE: 20 BRPM | SYSTOLIC BLOOD PRESSURE: 123 MMHG | HEIGHT: 69 IN | DIASTOLIC BLOOD PRESSURE: 77 MMHG | HEART RATE: 69 BPM

## 2023-08-01 DIAGNOSIS — M54.41 ACUTE BILATERAL LOW BACK PAIN WITH RIGHT-SIDED SCIATICA: ICD-10-CM

## 2023-08-01 DIAGNOSIS — F32.A DEPRESSION, UNSPECIFIED DEPRESSION TYPE: ICD-10-CM

## 2023-08-01 DIAGNOSIS — I10 PRIMARY HYPERTENSION: ICD-10-CM

## 2023-08-01 DIAGNOSIS — K21.9 GASTROESOPHAGEAL REFLUX DISEASE, UNSPECIFIED WHETHER ESOPHAGITIS PRESENT: ICD-10-CM

## 2023-08-01 PROBLEM — E66.01 CLASS 2 SEVERE OBESITY DUE TO EXCESS CALORIES WITH SERIOUS COMORBIDITY AND BODY MASS INDEX (BMI) OF 35.0 TO 35.9 IN ADULT: Status: ACTIVE | Noted: 2023-08-01

## 2023-08-01 PROBLEM — E66.812 CLASS 2 SEVERE OBESITY DUE TO EXCESS CALORIES WITH SERIOUS COMORBIDITY AND BODY MASS INDEX (BMI) OF 35.0 TO 35.9 IN ADULT: Status: ACTIVE | Noted: 2023-08-01

## 2023-08-01 PROCEDURE — 99214 OFFICE O/P EST MOD 30 MIN: CPT

## 2023-08-01 RX ORDER — BUSPIRONE HYDROCHLORIDE 15 MG/1
15 TABLET ORAL 2 TIMES DAILY
Qty: 180 TABLET | Refills: 2 | Status: SHIPPED | OUTPATIENT
Start: 2023-08-01 | End: 2023-08-01 | Stop reason: SDUPTHER

## 2023-08-01 RX ORDER — CARVEDILOL 3.12 MG/1
TABLET ORAL
Qty: 270 TABLET | Refills: 1 | Status: SHIPPED | OUTPATIENT
Start: 2023-08-01

## 2023-08-01 RX ORDER — CARVEDILOL 3.12 MG/1
TABLET ORAL
Qty: 270 TABLET | Refills: 2 | Status: SHIPPED | OUTPATIENT
Start: 2023-08-01 | End: 2023-08-01 | Stop reason: SDUPTHER

## 2023-08-01 RX ORDER — BUSPIRONE HYDROCHLORIDE 15 MG/1
15 TABLET ORAL 2 TIMES DAILY
Qty: 180 TABLET | Refills: 1 | Status: SHIPPED | OUTPATIENT
Start: 2023-08-01 | End: 2024-01-28

## 2023-08-01 RX ORDER — HYDROCHLOROTHIAZIDE 25 MG/1
25 TABLET ORAL DAILY
Qty: 90 TABLET | Refills: 1 | Status: SHIPPED | OUTPATIENT
Start: 2023-08-01 | End: 2024-01-28

## 2023-08-01 RX ORDER — SEMAGLUTIDE 0.25 MG/.5ML
0.25 INJECTION, SOLUTION SUBCUTANEOUS WEEKLY
Qty: 2 ML | Refills: 0 | Status: SHIPPED | OUTPATIENT
Start: 2023-08-01 | End: 2023-08-31

## 2023-08-01 RX ORDER — METHYLPREDNISOLONE 4 MG/1
TABLET ORAL
Qty: 21 TABLET | Refills: 0 | Status: SHIPPED | OUTPATIENT
Start: 2023-08-01 | End: 2023-08-01

## 2023-08-01 RX ORDER — LOSARTAN POTASSIUM 50 MG/1
100 TABLET ORAL DAILY
Qty: 60 TABLET | Refills: 6 | Status: SHIPPED | OUTPATIENT
Start: 2023-08-01 | End: 2023-08-01 | Stop reason: SDUPTHER

## 2023-08-01 RX ORDER — OMEPRAZOLE 20 MG/1
20 CAPSULE, DELAYED RELEASE ORAL DAILY
Qty: 90 CAPSULE | Refills: 1 | Status: SHIPPED | OUTPATIENT
Start: 2023-08-01 | End: 2024-01-28

## 2023-08-01 RX ORDER — OMEPRAZOLE 20 MG/1
20 CAPSULE, DELAYED RELEASE ORAL DAILY
Qty: 90 CAPSULE | Refills: 2 | Status: SHIPPED | OUTPATIENT
Start: 2023-08-01 | End: 2023-08-01 | Stop reason: SDUPTHER

## 2023-08-01 RX ORDER — LOSARTAN POTASSIUM 50 MG/1
100 TABLET ORAL DAILY
Qty: 180 TABLET | Refills: 1 | Status: SHIPPED | OUTPATIENT
Start: 2023-08-01 | End: 2024-01-28

## 2023-08-01 RX ORDER — HYDROCHLOROTHIAZIDE 25 MG/1
25 TABLET ORAL DAILY
Qty: 30 TABLET | Refills: 6 | Status: SHIPPED | OUTPATIENT
Start: 2023-08-01 | End: 2023-08-01 | Stop reason: SDUPTHER

## 2023-08-02 ENCOUNTER — TELEPHONE (OUTPATIENT)
Dept: FAMILY MEDICINE CLINIC | Facility: CLINIC | Age: 68
End: 2023-08-02
Payer: COMMERCIAL

## 2023-08-02 RX ORDER — SEMAGLUTIDE 0.25 MG/.5ML
0.25 INJECTION, SOLUTION SUBCUTANEOUS WEEKLY
Qty: 2 ML | Refills: 0 | OUTPATIENT
Start: 2023-08-02 | End: 2023-09-01

## 2023-08-03 ENCOUNTER — TELEPHONE (OUTPATIENT)
Dept: FAMILY MEDICINE CLINIC | Facility: CLINIC | Age: 68
End: 2023-08-03
Payer: COMMERCIAL

## 2023-08-17 ENCOUNTER — TELEPHONE (OUTPATIENT)
Dept: FAMILY MEDICINE CLINIC | Facility: CLINIC | Age: 68
End: 2023-08-17
Payer: COMMERCIAL

## 2023-08-17 ENCOUNTER — TELEPHONE (OUTPATIENT)
Dept: FAMILY MEDICINE CLINIC | Facility: CLINIC | Age: 68
End: 2023-08-17

## 2023-08-17 NOTE — TELEPHONE ENCOUNTER
Caller: Loan Nam    Relationship: Self    Best call back number: [phone]    What is the best time to reach you: ANY    Who are you requesting to speak with (clinical staff, provider,  specific staff member): CLINICAL    Do you know the name of the person who called: PATIENT    What was the call regarding: PATIENT CALLED TO LET YOU KNOW THAT HER INSURANCE COMPANY HAD DENIED THE WEIGHT LOSS MEDICATION THAT WAS PRESCRIBED. SHE SAID THAT HER INSURANCE COMPANY WAS FAXING OVER A LIST OF ALTERNATIVE MEDICATIONS THAT THEY WOULD COVER. FAXED WAS NOT RECEIVED AS FAR AS I COULD TELL. PATIENT WAS ON HER BREAK FROM WORK. SHE WANTED TO BE CONNECTED TO . COULD NOT DO THIS DUE TO YOUR PHONE ISSUES TODAY TOLD PATIENT THAT SHE WOULD BE CALLED RIGHT BACK. SHE STATED SHE IS AT WORK AND COULD NOT TAKE THE CALL. TOLD PATIENT I WOULD SEND NOTE BACK AND LET YOU KNOW. YOU MIGHT GET A FAX FROM HER INSURANCE COMPANY.     PA MAY BE APPROVED SOON. PLEASE WORK SOMETHING OUT WITH THE PATIENT. IF SHE CALLS BACK MAYBE IF HER PA GETS APPROVED, LEAVE A NOTE ON HER CHART THAT HUB CAN READ THAT WILL LET HER KNOW THAT MEDICATION IS APPROVED OR YOU RECEIVED FAX AND PRESCRIBED ANOTHER MEDICINE. SINCE SHE WILL MOST LIKELY HAVE TO CALL BACK.    MAYBE YOU CAN SEND HER A TEXT?    Is it okay if the provider responds through [employer]: N/A

## 2023-09-07 ENCOUNTER — OFFICE VISIT (OUTPATIENT)
Dept: FAMILY MEDICINE CLINIC | Facility: CLINIC | Age: 68
End: 2023-09-07
Payer: COMMERCIAL

## 2023-09-07 ENCOUNTER — TELEPHONE (OUTPATIENT)
Dept: FAMILY MEDICINE CLINIC | Facility: CLINIC | Age: 68
End: 2023-09-07

## 2023-09-07 VITALS
DIASTOLIC BLOOD PRESSURE: 98 MMHG | HEIGHT: 69 IN | HEART RATE: 100 BPM | SYSTOLIC BLOOD PRESSURE: 150 MMHG | OXYGEN SATURATION: 99 % | BODY MASS INDEX: 35.1 KG/M2 | WEIGHT: 237 LBS

## 2023-09-07 DIAGNOSIS — R35.0 URINARY FREQUENCY: Primary | ICD-10-CM

## 2023-09-07 LAB
BILIRUB BLD-MCNC: NEGATIVE MG/DL
CLARITY, POC: CLEAR
COLOR UR: YELLOW
EXPIRATION DATE: ABNORMAL
GLUCOSE UR STRIP-MCNC: NEGATIVE MG/DL
KETONES UR QL: NEGATIVE
LEUKOCYTE EST, POC: NEGATIVE
Lab: ABNORMAL
NITRITE UR-MCNC: NEGATIVE MG/ML
PH UR: 5.5 [PH] (ref 5–8)
PROT UR STRIP-MCNC: NEGATIVE MG/DL
RBC # UR STRIP: NEGATIVE /UL
SP GR UR: 1.02
UROBILINOGEN UR QL: ABNORMAL

## 2023-09-07 PROCEDURE — 87086 URINE CULTURE/COLONY COUNT: CPT

## 2023-09-07 PROCEDURE — 99213 OFFICE O/P EST LOW 20 MIN: CPT

## 2023-09-07 RX ORDER — CIPROFLOXACIN HCL 100 MG
100 TABLET ORAL 2 TIMES DAILY
Qty: 8 TABLET | Refills: 0 | Status: SHIPPED | OUTPATIENT
Start: 2023-09-07 | End: 2023-09-11

## 2023-09-07 RX ORDER — CIPROFLOXACIN HCL 100 MG
100 TABLET ORAL 2 TIMES DAILY
Qty: 8 TABLET | Refills: 0 | Status: SHIPPED | OUTPATIENT
Start: 2023-09-07 | End: 2023-09-07 | Stop reason: SDUPTHER

## 2023-09-07 NOTE — PROGRESS NOTES
Chief Complaint  Urinary Tract Infection and Back Pain  PT PRESENTS TODAY STATES WENT TO URGENT CARE X 1 WEEK AGO FOR UTI WAS GIVEN MEDICATION FOR UTI HOWEVER ONLY FOR PT STATES STARTED HAVING ALLERGIC  REACTIONS SO THEY SWITCHED MEDS TO CIPRO FOR 3 DAYS. PT STATES IS STILL HAVING UTI SYMPTOMS AND BACK PAIN STILL.     SUBJECTIVE  Loan Nam presents to [facility]    History of Present Illness  67-year-old Loan Nam presents today with complaints of lower back pain and urinary frequency.  Patient states that on 2023 she went to the urgent care and was given Cipro for 3 days.  Patient states that she feels that if she is not had complete course of antibiotics at this time.  She states that she had an allergic reaction to the Macrobid.   Past Medical History:   Diagnosis Date    Acid reflux     Allergic rhinitis     Anemia     unspecified    Arthritis     2016 left knee    Asthma     Bile salt-induced diarrhea     Depression     H/O bone density study     never    History of pulmonary embolism     Hypertension     Limb swelling     Moderate episode of recurrent major depressive disorder 2020    Muscle cramp, nocturnal 2018    Pap smear for cervical cancer screening     no longer hysterectomy    Renal insufficiency 2021    Seasonal allergies     SOB (shortness of breath)     Venous insufficiency of left leg 2018    she needs to elevated nad wear compression hose. 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    Visit for screening mammogram 10/2019    normal      Family History   Problem Relation Age of Onset    Hypertension Mother     Cancer Mother     Arthritis Mother     Osteoporosis Mother     Hypertension Father     Cancer Father     Other Father         blood diseases    Hypertension Sister     Cancer Sister     Arthritis Sister       Past Surgical History:   Procedure Laterality Date    APPENDECTOMY        "SECTION  1977    CHOLECYSTECTOMY  1977    COLONOSCOPY  12/2014    normal    GALLBLADDER SURGERY      HYSTERECTOMY  2004    OTHER SURGICAL HISTORY      mass removal on neck    PLANTAR FASCIA SURGERY Left     TONSILLECTOMY          Current Outpatient Medications:     ciprofloxacin (CIPRO) 100 MG tablet, Take 1 tablet by mouth 2 (Two) Times a Day for 4 days., Disp: 8 tablet, Rfl: 0    albuterol sulfate  (90 Base) MCG/ACT inhaler, Inhale 2 puffs Every 4 (Four) Hours As Needed for Wheezing., Disp: 18 g, Rfl: 2    busPIRone (BUSPAR) 15 MG tablet, Take 1 tablet by mouth 2 (Two) Times a Day for 180 days., Disp: 180 tablet, Rfl: 1    carvedilol (COREG) 3.125 MG tablet, Take 2 tabs PO in the am and one tab pm, Disp: 270 tablet, Rfl: 1    hydroCHLOROthiazide (HYDRODIURIL) 25 MG tablet, Take 1 tablet by mouth Daily for 180 days., Disp: 90 tablet, Rfl: 1    losartan (Cozaar) 50 MG tablet, Take 2 tablets by mouth Daily for 180 days., Disp: 180 tablet, Rfl: 1    methocarbamol (ROBAXIN) 500 MG tablet, Take 1 tablet by mouth 2 (Two) Times a Day As Needed for Muscle Spasms. (Patient not taking: Reported on 8/1/2023), Disp: 10 tablet, Rfl: 0    omeprazole (priLOSEC) 20 MG capsule, Take 1 capsule by mouth Daily for 180 days., Disp: 90 capsule, Rfl: 1    OBJECTIVE  Vital Signs:   /98   Pulse 100   Ht 175.3 cm (69\")   Wt 108 kg (237 lb)   SpO2 99%   BMI 35.00 kg/m²    Estimated body mass index is 35 kg/m² as calculated from the following:    Height as of this encounter: 175.3 cm (69\").    Weight as of this encounter: 108 kg (237 lb).     Wt Readings from Last 3 Encounters:   09/07/23 108 kg (237 lb)   08/01/23 108 kg (238 lb)   07/10/23 104 kg (230 lb)     BP Readings from Last 3 Encounters:   09/07/23 150/98   08/01/23 123/77   04/03/23 165/98       Physical Exam  Vitals and nursing note reviewed.   Constitutional:       Appearance: Normal appearance.   HENT:      Head: Normocephalic and atraumatic.      Nose: Nose " normal.      Mouth/Throat:      Mouth: Mucous membranes are moist.   Eyes:      Conjunctiva/sclera: Conjunctivae normal.      Pupils: Pupils are equal, round, and reactive to light.   Cardiovascular:      Rate and Rhythm: Normal rate and regular rhythm.      Pulses: Normal pulses.      Heart sounds: Normal heart sounds.   Pulmonary:      Effort: Pulmonary effort is normal.      Breath sounds: Normal breath sounds.   Abdominal:      General: Abdomen is flat.      Palpations: Abdomen is soft.   Musculoskeletal:         General: Normal range of motion.      Cervical back: Normal range of motion and neck supple.   Skin:     General: Skin is warm and dry.   Neurological:      General: No focal deficit present.      Mental Status: She is alert and oriented to person, place, and time. Mental status is at baseline.   Psychiatric:         Mood and Affect: Mood normal.         Behavior: Behavior normal.         Thought Content: Thought content normal.         Judgment: Judgment normal.        Result Review        No Images in the past 120 days found..      The above data has been reviewed by ARIN Calvert 09/07/2023 14:58 EDT.          Patient Care Team:  Dotty Nathan APRN as PCP - General (Emergency Medicine)            ASSESSMENT & PLAN    Diagnoses and all orders for this visit:    1. Urinary frequency (Primary)  -     Urine Culture - Urine, Urine, Clean Catch; Future    Other orders  -     Discontinue: ciprofloxacin (CIPRO) 100 MG tablet; Take 1 tablet by mouth 2 (Two) Times a Day for 4 days.  Dispense: 8 tablet; Refill: 0  -     ciprofloxacin (CIPRO) 100 MG tablet; Take 1 tablet by mouth 2 (Two) Times a Day for 4 days.  Dispense: 8 tablet; Refill: 0         Tobacco Use: Low Risk     Smoking Tobacco Use: Never    Smokeless Tobacco Use: Never    Passive Exposure: Never       Follow Up     No follow-ups on file.      Patient was given instructions and counseling regarding her condition or for health maintenance  advice. Please see specific information pulled into the AVS if appropriate.   I have reviewed information obtained and documented by others and I have confirmed the accuracy of this documented note.    ARIN Calvert

## 2023-09-09 LAB — BACTERIA SPEC AEROBE CULT: NORMAL

## 2023-09-14 ENCOUNTER — TELEPHONE (OUTPATIENT)
Dept: ORTHOPEDIC SURGERY | Facility: CLINIC | Age: 68
End: 2023-09-14
Payer: COMMERCIAL

## 2023-09-14 NOTE — TELEPHONE ENCOUNTER
Caller: Loan Nam    Relationship to patient: Self    Best call back number: [phone]    Chief complaint: BILATERAL KNEE PAIN    Type of visit: INJECTIONS    Requested date: WEEK OF OCT 9-13TH     Additional notes: OKAY TO YANEM

## 2023-10-02 ENCOUNTER — OFFICE VISIT (OUTPATIENT)
Dept: FAMILY MEDICINE CLINIC | Facility: CLINIC | Age: 68
End: 2023-10-02
Payer: COMMERCIAL

## 2023-10-02 VITALS
HEART RATE: 57 BPM | HEIGHT: 69 IN | OXYGEN SATURATION: 100 % | SYSTOLIC BLOOD PRESSURE: 154 MMHG | BODY MASS INDEX: 34.51 KG/M2 | WEIGHT: 233 LBS | DIASTOLIC BLOOD PRESSURE: 70 MMHG

## 2023-10-02 DIAGNOSIS — G44.83 PRIMARY COUGH HEADACHE: ICD-10-CM

## 2023-10-02 DIAGNOSIS — J10.1 INFLUENZA A: Primary | ICD-10-CM

## 2023-10-02 DIAGNOSIS — R11.0 NAUSEA: ICD-10-CM

## 2023-10-02 LAB
EXPIRATION DATE: ABNORMAL
FLUAV AG UPPER RESP QL IA.RAPID: DETECTED
FLUBV AG UPPER RESP QL IA.RAPID: NOT DETECTED
INTERNAL CONTROL: ABNORMAL
Lab: ABNORMAL
SARS-COV-2 AG UPPER RESP QL IA.RAPID: NOT DETECTED

## 2023-10-02 PROCEDURE — 99213 OFFICE O/P EST LOW 20 MIN: CPT

## 2023-10-02 PROCEDURE — 87428 SARSCOV & INF VIR A&B AG IA: CPT

## 2023-10-02 RX ORDER — OSELTAMIVIR PHOSPHATE 75 MG/1
75 CAPSULE ORAL 2 TIMES DAILY
Qty: 10 CAPSULE | Refills: 0 | Status: SHIPPED | OUTPATIENT
Start: 2023-10-02 | End: 2023-10-07

## 2023-10-02 RX ORDER — ONDANSETRON 4 MG/1
4 TABLET, ORALLY DISINTEGRATING ORAL EVERY 8 HOURS PRN
Qty: 21 TABLET | Refills: 0 | Status: SHIPPED | OUTPATIENT
Start: 2023-10-02 | End: 2023-10-09

## 2023-10-02 NOTE — PROGRESS NOTES
Chief Complaint  Headache, Nausea, and CERVICAL NECK PAIN PT PRESENTS TODAY STATES WOKE UP WITH A  HEADACHE AND CERVICAL NECK PAIN.PT STATES SHE HAD A COUGH WITH NAUSEA FOR ABOUT 3 DAYS.      SUBJECTIVE  Loan Nam presents to [facility]    History of Present Illness  67-year-old Loan Nam presents today with complaints of headache and cervical neck pain that has been ongoing for approximately 2 to 3 days she also has cough with nausea that has been going on for about 2 to 3 days.  She is not taking anything over-the-counter at this time.  Past Medical History:   Diagnosis Date    Acid reflux     Allergic rhinitis     Anemia     unspecified    Arthritis     2016 left knee    Asthma     Bile salt-induced diarrhea     Depression     H/O bone density study     never    History of pulmonary embolism     Hypertension     Limb swelling     Moderate episode of recurrent major depressive disorder 2020    Muscle cramp, nocturnal 2018    Pap smear for cervical cancer screening     no longer hysterectomy    Renal insufficiency 2021    Seasonal allergies     SOB (shortness of breath)     Venous insufficiency of left leg 2018    she needs to elevated nad wear compression hose. 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    Visit for screening mammogram 10/2019    normal      Family History   Problem Relation Age of Onset    Hypertension Mother     Cancer Mother     Arthritis Mother     Osteoporosis Mother     Hypertension Father     Cancer Father     Other Father         blood diseases    Hypertension Sister     Cancer Sister     Arthritis Sister       Past Surgical History:   Procedure Laterality Date    APPENDECTOMY       SECTION      CHOLECYSTECTOMY  1977    COLONOSCOPY  2014    normal    GALLBLADDER SURGERY      HYSTERECTOMY      OTHER SURGICAL HISTORY      mass removal on neck    PLANTAR FASCIA SURGERY Left      "TONSILLECTOMY          Current Outpatient Medications:     albuterol sulfate  (90 Base) MCG/ACT inhaler, Inhale 2 puffs Every 4 (Four) Hours As Needed for Wheezing., Disp: 18 g, Rfl: 2    busPIRone (BUSPAR) 15 MG tablet, Take 1 tablet by mouth 2 (Two) Times a Day for 180 days., Disp: 180 tablet, Rfl: 1    carvedilol (COREG) 3.125 MG tablet, Take 2 tabs PO in the am and one tab pm, Disp: 270 tablet, Rfl: 1    hydroCHLOROthiazide (HYDRODIURIL) 25 MG tablet, Take 1 tablet by mouth Daily for 180 days., Disp: 90 tablet, Rfl: 1    losartan (Cozaar) 50 MG tablet, Take 2 tablets by mouth Daily for 180 days., Disp: 180 tablet, Rfl: 1    methocarbamol (ROBAXIN) 500 MG tablet, Take 1 tablet by mouth 2 (Two) Times a Day As Needed for Muscle Spasms. (Patient not taking: Reported on 8/1/2023), Disp: 10 tablet, Rfl: 0    omeprazole (priLOSEC) 20 MG capsule, Take 1 capsule by mouth Daily for 180 days., Disp: 90 capsule, Rfl: 1    ondansetron ODT (ZOFRAN-ODT) 4 MG disintegrating tablet, Place 1 tablet on the tongue Every 8 (Eight) Hours As Needed for Nausea or Vomiting for up to 7 days., Disp: 21 tablet, Rfl: 0    oseltamivir (Tamiflu) 75 MG capsule, Take 1 capsule by mouth 2 (Two) Times a Day for 5 days., Disp: 10 capsule, Rfl: 0    OBJECTIVE  Vital Signs:   /70   Pulse 57   Ht 175.3 cm (69\")   Wt 106 kg (233 lb)   SpO2 100%   BMI 34.41 kg/m²    Estimated body mass index is 34.41 kg/m² as calculated from the following:    Height as of this encounter: 175.3 cm (69\").    Weight as of this encounter: 106 kg (233 lb).     Wt Readings from Last 3 Encounters:   10/02/23 106 kg (233 lb)   09/07/23 108 kg (237 lb)   08/01/23 108 kg (238 lb)     BP Readings from Last 3 Encounters:   10/02/23 154/70   09/07/23 150/98   08/01/23 123/77       Physical Exam  Vitals and nursing note reviewed.   Constitutional:       Appearance: Normal appearance.   HENT:      Head: Normocephalic and atraumatic.      Nose: Congestion present.    " Event Note   Mouth/Throat:      Mouth: Mucous membranes are moist.   Eyes:      Conjunctiva/sclera: Conjunctivae normal.      Pupils: Pupils are equal, round, and reactive to light.   Cardiovascular:      Rate and Rhythm: Normal rate and regular rhythm.      Pulses: Normal pulses.      Heart sounds: Normal heart sounds.   Pulmonary:      Effort: Pulmonary effort is normal.      Breath sounds: Normal breath sounds.   Abdominal:      General: Abdomen is flat.      Palpations: Abdomen is soft.   Musculoskeletal:         General: Normal range of motion.      Cervical back: Normal range of motion and neck supple.   Skin:     General: Skin is warm and dry.   Neurological:      General: No focal deficit present.      Mental Status: She is alert and oriented to person, place, and time. Mental status is at baseline.   Psychiatric:         Mood and Affect: Mood normal.         Behavior: Behavior normal.         Thought Content: Thought content normal.         Judgment: Judgment normal.        Result Review        No Images in the past 120 days found..      The above data has been reviewed by ARIN Calvert 10/02/2023 09:55 EDT.          Patient Care Team:  Dotty Nathan APRN as PCP - General (Emergency Medicine)            ASSESSMENT & PLAN    Diagnoses and all orders for this visit:    1. Influenza A (Primary)  Comments:  Have sent in Tamiflu for patient.  She will take over-the-counter cough medicines to help with symptoms.    2. Primary cough headache  Comments:  Patient will get over-the-counter medicine that is good for high blood pressure such as Coricidin HBP  Orders:  -     POCT SARS-CoV-2 Antigen DANIELA    3. Nausea  Comments:  Prescribed patient Zofran for symptoms.  Orders:  -     POCT SARS-CoV-2 Antigen DANIELA    Other orders  -     oseltamivir (Tamiflu) 75 MG capsule; Take 1 capsule by mouth 2 (Two) Times a Day for 5 days.  Dispense: 10 capsule; Refill: 0  -     ondansetron ODT (ZOFRAN-ODT) 4 MG disintegrating tablet;  Place 1 tablet on the tongue Every 8 (Eight) Hours As Needed for Nausea or Vomiting for up to 7 days.  Dispense: 21 tablet; Refill: 0         Tobacco Use: Low Risk     Smoking Tobacco Use: Never    Smokeless Tobacco Use: Never    Passive Exposure: Never       Follow Up     No follow-ups on file.      Patient was given instructions and counseling regarding her condition or for health maintenance advice. Please see specific information pulled into the AVS if appropriate.   I have reviewed information obtained and documented by others and I have confirmed the accuracy of this documented note.    ARIN Calvert

## 2023-10-11 ENCOUNTER — OFFICE VISIT (OUTPATIENT)
Dept: ORTHOPEDIC SURGERY | Facility: CLINIC | Age: 68
End: 2023-10-11
Payer: COMMERCIAL

## 2023-10-11 VITALS — BODY MASS INDEX: 34.51 KG/M2 | HEIGHT: 69 IN | WEIGHT: 233 LBS

## 2023-10-11 DIAGNOSIS — M17.0 BILATERAL PRIMARY OSTEOARTHRITIS OF KNEE: Primary | ICD-10-CM

## 2023-10-11 RX ORDER — TRIAMCINOLONE ACETONIDE 40 MG/ML
40 INJECTION, SUSPENSION INTRA-ARTICULAR; INTRAMUSCULAR
Status: COMPLETED | OUTPATIENT
Start: 2023-10-11 | End: 2023-10-11

## 2023-10-11 RX ORDER — LIDOCAINE HYDROCHLORIDE 10 MG/ML
5 INJECTION, SOLUTION EPIDURAL; INFILTRATION; INTRACAUDAL; PERINEURAL
Status: COMPLETED | OUTPATIENT
Start: 2023-10-11 | End: 2023-10-11

## 2023-10-11 RX ADMIN — TRIAMCINOLONE ACETONIDE 40 MG: 40 INJECTION, SUSPENSION INTRA-ARTICULAR; INTRAMUSCULAR at 09:54

## 2023-10-11 RX ADMIN — LIDOCAINE HYDROCHLORIDE 5 ML: 10 INJECTION, SOLUTION EPIDURAL; INFILTRATION; INTRACAUDAL; PERINEURAL at 09:54

## 2023-10-11 NOTE — PROGRESS NOTES
"Chief Complaint  Pain and Follow-up of the Left Knee and Follow-up and Pain of the Right Knee    Subjective      Loan Nam presents to [facility] for follow-up of bilateral knee pain and osteoarthritis.  Patient has been managing this conservatively with intermittent injections in the past.  She previously received bilateral knee steroid injections in office on 7/10/2023.  She presents independently ambulatory without use of assistive device.  Reports that her knees are \"hurting again\".  She reports increasing pain over the last 2 to 3 weeks.  She is interested in viscosupplementation in the future.    Objective   Allergies   Allergen Reactions    Contrast Dye (Echo Or Unknown Ct/Mr) Hives    Iodinated Contrast Media Hives    Norvasc [Amlodipine] Shortness Of Breath    Penicillins Swelling    Sulfa Antibiotics Rash       Vital Signs:   Ht 175.3 cm (69\")   Wt 106 kg (233 lb)   BMI 34.41 kg/mý       Physical Exam    Constitutional: Awake, alert. Well nourished appearance.    Integumentary: Warm, dry, intact. No obvious rashes.    HENT: Atraumatic, normocephalic.   Respiratory: Non labored respirations .   Cardiovascular: Intact peripheral pulses.    Psychiatric: Normal mood and affect. A&O X3    Ortho Exam  Bilateral knees: Skin is warm, dry, and intact.  Mild to moderate edema bilaterally.  Multiple bilateral lower extremity varicosities present.  Tenderness to palpation of medial and lateral joint lines.  -3 to -5 degrees of extension and flexion to 115-120 degrees.  Full plantarflexion and dorsiflexion of the ankle.  Sensation intact light touch.  Distal neurovascular intact.  Smooth sit to stand transition.  Patient fully weightbearing with antalgic gait.    Imaging Results (Most Recent)       None             Large Joint Arthrocentesis: R knee  Date/Time: 10/11/2023 9:54 AM  Consent given by: patient  Site marked: site marked  Timeout: Immediately prior to procedure a time out " was called to verify the correct patient, procedure, equipment, support staff and site/side marked as required   Supporting Documentation  Indications: pain   Procedure Details  Location: knee - R knee  Needle gauge: 21g.  Medications administered: 5 mL lidocaine PF 1% 1 %; 40 mg triamcinolone acetonide 40 MG/ML  Patient tolerance: patient tolerated the procedure well with no immediate complications      Large Joint Arthrocentesis: L knee  Date/Time: 10/11/2023 9:54 AM  Consent given by: patient  Site marked: site marked  Timeout: Immediately prior to procedure a time out was called to verify the correct patient, procedure, equipment, support staff and site/side marked as required   Supporting Documentation  Indications: pain   Procedure Details  Location: knee - L knee  Needle gauge: 21g.  Medications administered: 5 mL lidocaine PF 1% 1 %; 40 mg triamcinolone acetonide 40 MG/ML  Patient tolerance: patient tolerated the procedure well with no immediate complications              Assessment and Plan   Problem List Items Addressed This Visit    None  Visit Diagnoses       Bilateral primary osteoarthritis of knee    -  Primary            Follow Up   Return for Recheck.    Tobacco Use: Low Risk  (10/11/2023)    Patient History     Smoking Tobacco Use: Never     Smokeless Tobacco Use: Never     Passive Exposure: Never     Patient is a non-smoker.  Did not discuss tobacco cessation options.    Patient Instructions   Bilateral knee steroid injections administered in office today.  Patient advised on 3 months duration between injections.  We will seek insurance authorization for viscosupplementation.  Follow-up upon Visco approval.  Patient was given instructions and counseling regarding her condition or for health maintenance advice. Please see specific information pulled into the AVS if appropriate.

## 2023-10-11 NOTE — PATIENT INSTRUCTIONS
Bilateral knee steroid injections administered in office today.  Patient advised on 3 months duration between injections.  We will seek insurance authorization for viscosupplementation.  Follow-up upon Visco approval.

## 2023-10-23 ENCOUNTER — TELEPHONE (OUTPATIENT)
Dept: ORTHOPEDIC SURGERY | Facility: CLINIC | Age: 68
End: 2023-10-23
Payer: COMMERCIAL

## 2023-10-23 NOTE — TELEPHONE ENCOUNTER
----- Message from Sariah Fishman sent at 10/17/2023 11:09 AM EDT -----  Patient's insurance does not cover any Visco injections as they deem it not medically necessary.  Okay for patient to follow to discuss other options if she chooses to do so.

## 2023-11-01 ENCOUNTER — OFFICE VISIT (OUTPATIENT)
Dept: FAMILY MEDICINE CLINIC | Facility: CLINIC | Age: 68
End: 2023-11-01
Payer: COMMERCIAL

## 2023-11-01 VITALS
SYSTOLIC BLOOD PRESSURE: 142 MMHG | DIASTOLIC BLOOD PRESSURE: 72 MMHG | BODY MASS INDEX: 35.01 KG/M2 | OXYGEN SATURATION: 99 % | HEART RATE: 105 BPM | HEIGHT: 69 IN | WEIGHT: 236.4 LBS

## 2023-11-01 DIAGNOSIS — Z78.0 POST-MENOPAUSAL: ICD-10-CM

## 2023-11-01 DIAGNOSIS — Z13.6 ENCOUNTER FOR LIPID SCREENING FOR CARDIOVASCULAR DISEASE: ICD-10-CM

## 2023-11-01 DIAGNOSIS — I10 PRIMARY HYPERTENSION: ICD-10-CM

## 2023-11-01 DIAGNOSIS — F41.9 ANXIETY: ICD-10-CM

## 2023-11-01 DIAGNOSIS — Z11.59 ENCOUNTER FOR HEPATITIS C SCREENING TEST FOR LOW RISK PATIENT: ICD-10-CM

## 2023-11-01 DIAGNOSIS — Z13.820 SCREENING FOR OSTEOPOROSIS: ICD-10-CM

## 2023-11-01 DIAGNOSIS — Z00.00 ANNUAL PHYSICAL EXAM: Primary | ICD-10-CM

## 2023-11-01 DIAGNOSIS — J45.20 MILD INTERMITTENT ASTHMA WITHOUT COMPLICATION: ICD-10-CM

## 2023-11-01 DIAGNOSIS — Z13.220 ENCOUNTER FOR LIPID SCREENING FOR CARDIOVASCULAR DISEASE: ICD-10-CM

## 2023-11-01 DIAGNOSIS — K21.9 GASTROESOPHAGEAL REFLUX DISEASE, UNSPECIFIED WHETHER ESOPHAGITIS PRESENT: ICD-10-CM

## 2023-11-01 DIAGNOSIS — Z13.29 SCREENING FOR THYROID DISORDER: ICD-10-CM

## 2023-11-01 NOTE — PROGRESS NOTES
Chief Complaint  Establish Care, HTN, GERD, anxiety, asthma    SUBJECTIVE  Loan Nam presents to [facility]    History of Present Illness  Patient is a 68-year-old female presents today to establish care.  Previous PCP was ARIN Calvert.  Patient is current condition management of hypertension,  GERD, anxiety, asthma.    Hypertension-patient blood pressure is elevated in office at 142/72.  Upon review of prior vital signs 10/2/2023 154/70, 9/7/2023 150/98.  Patient is currently on losartan 50 mg, HCTZ 25 mg, carvedilol 3.125 mg.  Patient does not check her blood pressure at home.  Patient last lab work was May 2022 indicated chronic kidney disease.  Patient want to hold off on medications at this time.  Lab orders have been placed to recheck this before changing medication.  He has not established with nephrology.    GERD-patient is currently on omeprazole 20 mg.  Patient reports this controls her reflux well.  No issues at this time.    Anxiety-patient currently BuSpar 50 mg twice a day.  Patient reports this controls her anxiety well.  Denies any adverse effects medication.    Asthma-patient currently has albuterol rescue Hailer use at home.  Patient reports she only has to use it regularly.  Patient reports she barely uses 1 inhaler in a year.    Patient had mammogram orders placed she has not yet scheduled.  Patient reports she will get this scheduled.    Patient is due DEXA scan.  Orders to be placed at today's visit.  Patient is going to try to get that scheduled when she has her mammogram scheduled.    Patient is due labs. Orders placed at today's visit. Discussed with patient that these are fasting labs.     Patient is up-to-date on vaccines.    No other concerns or complaints at this time.      Past Medical History:   Diagnosis Date    Acid reflux     Allergic rhinitis     Anemia     unspecified    Arthritis     7/28/2016 left knee    Asthma     Bile  salt-induced diarrhea     Depression     H/O bone density study     never    History of pulmonary embolism     Hypertension     Limb swelling     Moderate episode of recurrent major depressive disorder 2020    Muscle cramp, nocturnal 2018    Pap smear for cervical cancer screening 2000    no longer hysterectomy    Renal insufficiency 2021    Seasonal allergies     SOB (shortness of breath)     Venous insufficiency of left leg 2018    she needs to elevated nad wear compression hose. 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    Visit for screening mammogram 10/2019    normal      Family History   Problem Relation Age of Onset    Hypertension Mother     Cancer Mother     Arthritis Mother     Osteoporosis Mother     Hypertension Father     Cancer Father     Other Father         blood diseases    Hypertension Sister     Cancer Sister     Arthritis Sister       Past Surgical History:   Procedure Laterality Date    APPENDECTOMY       SECTION      CHOLECYSTECTOMY      COLONOSCOPY  2014    normal    GALLBLADDER SURGERY      HYSTERECTOMY      OTHER SURGICAL HISTORY      mass removal on neck    PLANTAR FASCIA SURGERY Left     TONSILLECTOMY          Current Outpatient Medications:     albuterol sulfate  (90 Base) MCG/ACT inhaler, Inhale 2 puffs Every 4 (Four) Hours As Needed for Wheezing., Disp: 18 g, Rfl: 2    busPIRone (BUSPAR) 15 MG tablet, Take 1 tablet by mouth 2 (Two) Times a Day for 180 days., Disp: 180 tablet, Rfl: 1    carvedilol (COREG) 3.125 MG tablet, Take 2 tabs PO in the am and one tab pm, Disp: 270 tablet, Rfl: 1    hydroCHLOROthiazide (HYDRODIURIL) 25 MG tablet, Take 1 tablet by mouth Daily for 180 days., Disp: 90 tablet, Rfl: 1    losartan (Cozaar) 50 MG tablet, Take 2 tablets by mouth Daily for 180 days., Disp: 180 tablet, Rfl: 1    omeprazole (priLOSEC) 20 MG capsule, Take 1 capsule by mouth Daily for 180 days., Disp: 90 capsule, Rfl: 1    " methocarbamol (ROBAXIN) 500 MG tablet, Take 1 tablet by mouth 2 (Two) Times a Day As Needed for Muscle Spasms. (Patient not taking: Reported on 8/1/2023), Disp: 10 tablet, Rfl: 0    OBJECTIVE  Vital Signs:   /72 (BP Location: Left arm, Patient Position: Sitting, Cuff Size: Large Adult)   Pulse 105   Ht 175.3 cm (69\")   Wt 107 kg (236 lb 6.4 oz)   SpO2 99%   BMI 34.91 kg/m²    Estimated body mass index is 34.91 kg/m² as calculated from the following:    Height as of this encounter: 175.3 cm (69\").    Weight as of this encounter: 107 kg (236 lb 6.4 oz).     Wt Readings from Last 3 Encounters:   11/01/23 107 kg (236 lb 6.4 oz)   10/11/23 106 kg (233 lb)   10/02/23 106 kg (233 lb)     BP Readings from Last 3 Encounters:   11/01/23 142/72   10/02/23 154/70   09/07/23 150/98       Physical Exam  Vitals reviewed.   Constitutional:       General: She is awake. She is not in acute distress.     Appearance: She is obese. She is not ill-appearing.   HENT:      Head: Normocephalic and atraumatic.      Right Ear: Tympanic membrane, ear canal and external ear normal.      Left Ear: Tympanic membrane, ear canal and external ear normal.      Nose: Nose normal.      Mouth/Throat:      Mouth: Mucous membranes are moist.      Pharynx: Oropharynx is clear. No oropharyngeal exudate or posterior oropharyngeal erythema.   Eyes:      Extraocular Movements: Extraocular movements intact.      Conjunctiva/sclera: Conjunctivae normal.      Pupils: Pupils are equal, round, and reactive to light.   Cardiovascular:      Rate and Rhythm: Normal rate and regular rhythm.      Heart sounds: Normal heart sounds. No murmur heard.  Pulmonary:      Effort: Pulmonary effort is normal. No respiratory distress.      Breath sounds: Normal breath sounds.   Chest:      Chest wall: No tenderness.   Abdominal:      General: Abdomen is flat. Bowel sounds are normal. There is no distension.      Palpations: Abdomen is soft. There is no mass.      " Tenderness: There is no abdominal tenderness. There is no guarding.   Musculoskeletal:         General: No swelling or tenderness. Normal range of motion.      Cervical back: Normal range of motion and neck supple.   Skin:     General: Skin is warm and dry.      Findings: No rash.   Neurological:      General: No focal deficit present.      Mental Status: She is alert and oriented to person, place, and time. Mental status is at baseline.      Gait: Gait normal.   Psychiatric:         Mood and Affect: Mood normal.         Behavior: Behavior normal.         Thought Content: Thought content normal.         Judgment: Judgment normal.          Result Review        No Images in the past 120 days found..      The above data has been reviewed by ARIN Goldberg 11/01/2023 14:49 EDT.          Patient Care Team:  Cyndie Bess APRN as PCP - General (Nurse Practitioner)            ASSESSMENT & PLAN    Diagnoses and all orders for this visit:    1. Annual physical exam (Primary)  Comments:  Preventative counseling  Healthy diet  Daily exercise  Adequate sleep  Orders:  -     Comprehensive Metabolic Panel; Future  -     CBC & Differential; Future    2. Primary hypertension  Comments:  uncontrolled, hold off on medication changes untill we can assess kidney function, labs ordered  Orders:  -     Comprehensive Metabolic Panel; Future  -     CBC & Differential; Future  -     Lipid Panel; Future    3. Screening for thyroid disorder  -     TSH+Free T4; Future    4. Encounter for hepatitis C screening test for low risk patient  -     Hepatitis C antibody; Future    5. Encounter for lipid screening for cardiovascular disease  -     Lipid Panel; Future    6. Post-menopausal  -     DEXA Bone Density Axial    7. Screening for osteoporosis  -     DEXA Bone Density Axial    8. Anxiety  Comments:  Stable on BuSpar 15 mg twice daily, continue    9. Mild intermittent asthma without complication  Comments:  Stable albuterol as needed,  continue    10. Gastroesophageal reflux disease, unspecified whether esophagitis present  Comments:  Stable on Prilosec 20 mg, continue         Tobacco Use: Low Risk  (11/1/2023)    Patient History     Smoking Tobacco Use: Never     Smokeless Tobacco Use: Never     Passive Exposure: Never       Follow Up     Return in about 6 months (around 5/1/2024) for Next scheduled follow up.      Patient was given instructions and counseling regarding her condition or for health maintenance advice. Please see specific information pulled into the AVS if appropriate.   I have reviewed information obtained and documented by others and I have confirmed the accuracy of this documented note.    Cyndie Bess, ARIN

## 2023-12-14 ENCOUNTER — OFFICE VISIT (OUTPATIENT)
Dept: FAMILY MEDICINE CLINIC | Facility: CLINIC | Age: 68
End: 2023-12-14
Payer: COMMERCIAL

## 2023-12-14 VITALS
WEIGHT: 229 LBS | OXYGEN SATURATION: 100 % | DIASTOLIC BLOOD PRESSURE: 78 MMHG | BODY MASS INDEX: 48.07 KG/M2 | SYSTOLIC BLOOD PRESSURE: 143 MMHG | HEIGHT: 58 IN | HEART RATE: 80 BPM

## 2023-12-14 DIAGNOSIS — M54.41 ACUTE RIGHT-SIDED LOW BACK PAIN WITH RIGHT-SIDED SCIATICA: ICD-10-CM

## 2023-12-14 RX ORDER — TRIAMCINOLONE ACETONIDE 40 MG/ML
60 INJECTION, SUSPENSION INTRA-ARTICULAR; INTRAMUSCULAR ONCE
Status: COMPLETED | OUTPATIENT
Start: 2023-12-14 | End: 2023-12-14

## 2023-12-14 RX ORDER — METHOCARBAMOL 500 MG/1
500 TABLET, FILM COATED ORAL 2 TIMES DAILY PRN
Qty: 10 TABLET | Refills: 0 | Status: SHIPPED | OUTPATIENT
Start: 2023-12-14

## 2023-12-14 RX ADMIN — TRIAMCINOLONE ACETONIDE 60 MG: 40 INJECTION, SUSPENSION INTRA-ARTICULAR; INTRAMUSCULAR at 14:08

## 2023-12-14 NOTE — PROGRESS NOTES
Chief Complaint  Back Pain (1 wk )    SUBJECTIVE  Loan Nam presents to [facility]    History of Present Illness  Patient is a 68-year-old female who c/o right low back pain that radiates down her right leg. She has tried pain patches, heat, tylenol. Pt states that she had a sciatic flare up about a year to year and half ago.  Denies any known injury.  Patient reports it is worse when she is walking or moving from standing to sitting.    Pt states that she knows that she is due labs and preventative screening imaging.  No other questions or concerns today.    Past Medical History:   Diagnosis Date    Acid reflux     Allergic rhinitis     Anemia     unspecified    Arthritis     2016 left knee    Asthma     Bile salt-induced diarrhea     Depression     H/O bone density study     never    History of pulmonary embolism     Hypertension     Limb swelling     Moderate episode of recurrent major depressive disorder 2020    Muscle cramp, nocturnal 2018    Pap smear for cervical cancer screening     no longer hysterectomy    Renal insufficiency 2021    Seasonal allergies     SOB (shortness of breath)     Venous insufficiency of left leg 2018    she needs to elevated nad wear compression hose. 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    Visit for screening mammogram 10/2019    normal      Family History   Problem Relation Age of Onset    Hypertension Mother     Cancer Mother     Arthritis Mother     Osteoporosis Mother     Hypertension Father     Cancer Father     Other Father         blood diseases    Hypertension Sister     Cancer Sister     Arthritis Sister       Past Surgical History:   Procedure Laterality Date    APPENDECTOMY       SECTION      CHOLECYSTECTOMY      COLONOSCOPY  2014    normal    GALLBLADDER SURGERY      HYSTERECTOMY      OTHER SURGICAL HISTORY      mass removal on neck    PLANTAR FASCIA  "SURGERY Left     TONSILLECTOMY          Current Outpatient Medications:     albuterol sulfate  (90 Base) MCG/ACT inhaler, Inhale 2 puffs Every 4 (Four) Hours As Needed for Wheezing., Disp: 18 g, Rfl: 2    busPIRone (BUSPAR) 15 MG tablet, Take 1 tablet by mouth 2 (Two) Times a Day for 180 days., Disp: 180 tablet, Rfl: 1    carvedilol (COREG) 3.125 MG tablet, Take 2 tabs PO in the am and one tab pm, Disp: 270 tablet, Rfl: 1    hydroCHLOROthiazide (HYDRODIURIL) 25 MG tablet, Take 1 tablet by mouth Daily for 180 days., Disp: 90 tablet, Rfl: 1    losartan (Cozaar) 50 MG tablet, Take 2 tablets by mouth Daily for 180 days., Disp: 180 tablet, Rfl: 1    methocarbamol (ROBAXIN) 500 MG tablet, Take 1 tablet by mouth 2 (Two) Times a Day As Needed for Muscle Spasms., Disp: 10 tablet, Rfl: 0    omeprazole (priLOSEC) 20 MG capsule, Take 1 capsule by mouth Daily for 180 days., Disp: 90 capsule, Rfl: 1  No current facility-administered medications for this visit.    OBJECTIVE  Vital Signs:   /78 (BP Location: Left arm, Patient Position: Sitting, Cuff Size: Large Adult)   Pulse 80   Ht 147.6 cm (58.1\")   Wt 104 kg (229 lb)   SpO2 100%   BMI 47.70 kg/m²    Estimated body mass index is 47.7 kg/m² as calculated from the following:    Height as of this encounter: 147.6 cm (58.1\").    Weight as of this encounter: 104 kg (229 lb).     Wt Readings from Last 3 Encounters:   12/14/23 104 kg (229 lb)   11/01/23 107 kg (236 lb 6.4 oz)   10/11/23 106 kg (233 lb)     BP Readings from Last 3 Encounters:   12/14/23 143/78   11/01/23 142/72   10/02/23 154/70       Physical Exam  Vitals reviewed.   Constitutional:       General: She is not in acute distress.  HENT:      Head: Normocephalic and atraumatic.   Eyes:      Conjunctiva/sclera: Conjunctivae normal.   Cardiovascular:      Rate and Rhythm: Normal rate and regular rhythm.      Heart sounds: Normal heart sounds.   Pulmonary:      Effort: Pulmonary effort is normal.      Breath " sounds: Normal breath sounds.   Musculoskeletal:      Cervical back: Normal range of motion.      Lumbar back: No swelling, deformity, spasms or tenderness.        Back:    Skin:     General: Skin is warm and dry.   Neurological:      Mental Status: She is alert and oriented to person, place, and time.   Psychiatric:         Mood and Affect: Mood normal.         Behavior: Behavior normal.         Thought Content: Thought content normal.         Judgment: Judgment normal.          Result Review        No Images in the past 120 days found..      The above data has been reviewed by ARIN Goldberg 12/14/2023 13:42 EST.          Patient Care Team:  Cyndie Bess APRN as PCP - General (Nurse Practitioner)            ASSESSMENT & PLAN    Diagnoses and all orders for this visit:    1. Acute right-sided low back pain with right-sided sciatica  Comments:  Kenalog injection given in office today, refill for Robaxin sent, recommended stretching and heat, stay off for tomorrow, may return Monday  Orders:  -     triamcinolone acetonide (KENALOG-40) injection 60 mg  -     methocarbamol (ROBAXIN) 500 MG tablet; Take 1 tablet by mouth 2 (Two) Times a Day As Needed for Muscle Spasms.  Dispense: 10 tablet; Refill: 0         Tobacco Use: Low Risk  (12/14/2023)    Patient History     Smoking Tobacco Use: Never     Smokeless Tobacco Use: Never     Passive Exposure: Never       Follow Up     Return if symptoms worsen or fail to improve.      Patient was given instructions and counseling regarding her condition or for health maintenance advice. Please see specific information pulled into the AVS if appropriate.   I have reviewed information obtained and documented by others and I have confirmed the accuracy of this documented note.    ARIN Goldberg

## 2023-12-15 ENCOUNTER — TELEPHONE (OUTPATIENT)
Dept: FAMILY MEDICINE CLINIC | Facility: CLINIC | Age: 68
End: 2023-12-15
Payer: COMMERCIAL

## 2023-12-21 ENCOUNTER — TELEPHONE (OUTPATIENT)
Dept: FAMILY MEDICINE CLINIC | Facility: CLINIC | Age: 68
End: 2023-12-21
Payer: COMMERCIAL

## 2023-12-21 NOTE — TELEPHONE ENCOUNTER
Made patient aware of overdue mammogram, will call back for centralized scheduling number to get that scheduled.

## 2023-12-21 NOTE — TELEPHONE ENCOUNTER
Tried calling patient regarding over due mammogram that is still pending.Lvm reminding that your provider ordered a  mammogram for you to get done.  You may get this done at the [facility] or at [facility] ([facility] in [facility].    Please call [phone], [phone] to get this scheduled if you haven't already.    Thank you!

## 2023-12-28 ENCOUNTER — LAB (OUTPATIENT)
Dept: LAB | Facility: HOSPITAL | Age: 68
End: 2023-12-28
Payer: COMMERCIAL

## 2023-12-28 DIAGNOSIS — Z13.220 ENCOUNTER FOR LIPID SCREENING FOR CARDIOVASCULAR DISEASE: ICD-10-CM

## 2023-12-28 DIAGNOSIS — R74.8 ELEVATED ALKALINE PHOSPHATASE LEVEL: ICD-10-CM

## 2023-12-28 DIAGNOSIS — I10 PRIMARY HYPERTENSION: ICD-10-CM

## 2023-12-28 DIAGNOSIS — Z11.59 ENCOUNTER FOR HEPATITIS C SCREENING TEST FOR LOW RISK PATIENT: ICD-10-CM

## 2023-12-28 DIAGNOSIS — Z00.00 ANNUAL PHYSICAL EXAM: ICD-10-CM

## 2023-12-28 DIAGNOSIS — Z13.6 ENCOUNTER FOR LIPID SCREENING FOR CARDIOVASCULAR DISEASE: ICD-10-CM

## 2023-12-28 DIAGNOSIS — Z13.29 SCREENING FOR THYROID DISORDER: ICD-10-CM

## 2023-12-28 LAB
ALBUMIN SERPL-MCNC: 4.1 G/DL (ref 3.5–5.2)
ALBUMIN/GLOB SERPL: 1.3 G/DL
ALP SERPL-CCNC: 141 U/L (ref 39–117)
ALT SERPL W P-5'-P-CCNC: 12 U/L (ref 1–33)
ANION GAP SERPL CALCULATED.3IONS-SCNC: 12.1 MMOL/L (ref 5–15)
AST SERPL-CCNC: 11 U/L (ref 1–32)
BASOPHILS # BLD AUTO: 0.06 10*3/MM3 (ref 0–0.2)
BASOPHILS NFR BLD AUTO: 0.6 % (ref 0–1.5)
BILIRUB SERPL-MCNC: 0.5 MG/DL (ref 0–1.2)
BUN SERPL-MCNC: 17 MG/DL (ref 8–23)
BUN/CREAT SERPL: 16.8 (ref 7–25)
CALCIUM SPEC-SCNC: 8.3 MG/DL (ref 8.6–10.5)
CHLORIDE SERPL-SCNC: 105 MMOL/L (ref 98–107)
CHOLEST SERPL-MCNC: 144 MG/DL (ref 0–200)
CO2 SERPL-SCNC: 25.9 MMOL/L (ref 22–29)
CREAT SERPL-MCNC: 1.01 MG/DL (ref 0.57–1)
DEPRECATED RDW RBC AUTO: 43.1 FL (ref 37–54)
EGFRCR SERPLBLD CKD-EPI 2021: 61.5 ML/MIN/1.73
EOSINOPHIL # BLD AUTO: 0.09 10*3/MM3 (ref 0–0.4)
EOSINOPHIL NFR BLD AUTO: 0.8 % (ref 0.3–6.2)
ERYTHROCYTE [DISTWIDTH] IN BLOOD BY AUTOMATED COUNT: 13.3 % (ref 12.3–15.4)
GLOBULIN UR ELPH-MCNC: 3.2 GM/DL
GLUCOSE SERPL-MCNC: 96 MG/DL (ref 65–99)
HCT VFR BLD AUTO: 36.7 % (ref 34–46.6)
HCV AB SER DONR QL: NORMAL
HDLC SERPL-MCNC: 34 MG/DL (ref 40–60)
HGB BLD-MCNC: 12.5 G/DL (ref 12–15.9)
IMM GRANULOCYTES # BLD AUTO: 0.05 10*3/MM3 (ref 0–0.05)
IMM GRANULOCYTES NFR BLD AUTO: 0.5 % (ref 0–0.5)
LDLC SERPL CALC-MCNC: 93 MG/DL (ref 0–100)
LDLC/HDLC SERPL: 2.7 {RATIO}
LYMPHOCYTES # BLD AUTO: 1.63 10*3/MM3 (ref 0.7–3.1)
LYMPHOCYTES NFR BLD AUTO: 15.2 % (ref 19.6–45.3)
MCH RBC QN AUTO: 30.3 PG (ref 26.6–33)
MCHC RBC AUTO-ENTMCNC: 34.1 G/DL (ref 31.5–35.7)
MCV RBC AUTO: 88.9 FL (ref 79–97)
MONOCYTES # BLD AUTO: 0.58 10*3/MM3 (ref 0.1–0.9)
MONOCYTES NFR BLD AUTO: 5.4 % (ref 5–12)
NEUTROPHILS NFR BLD AUTO: 77.5 % (ref 42.7–76)
NEUTROPHILS NFR BLD AUTO: 8.3 10*3/MM3 (ref 1.7–7)
NRBC BLD AUTO-RTO: 0 /100 WBC (ref 0–0.2)
PLATELET # BLD AUTO: 289 10*3/MM3 (ref 140–450)
PMV BLD AUTO: 10.6 FL (ref 6–12)
POTASSIUM SERPL-SCNC: 3.6 MMOL/L (ref 3.5–5.2)
PROT SERPL-MCNC: 7.3 G/DL (ref 6–8.5)
RBC # BLD AUTO: 4.13 10*6/MM3 (ref 3.77–5.28)
SODIUM SERPL-SCNC: 143 MMOL/L (ref 136–145)
T4 FREE SERPL-MCNC: 1.18 NG/DL (ref 0.93–1.7)
TRIGL SERPL-MCNC: 91 MG/DL (ref 0–150)
TSH SERPL DL<=0.05 MIU/L-ACNC: 3.26 UIU/ML (ref 0.27–4.2)
VLDLC SERPL-MCNC: 17 MG/DL (ref 5–40)
WBC NRBC COR # BLD AUTO: 10.71 10*3/MM3 (ref 3.4–10.8)

## 2023-12-28 PROCEDURE — 86803 HEPATITIS C AB TEST: CPT

## 2023-12-28 PROCEDURE — 82977 ASSAY OF GGT: CPT

## 2023-12-28 PROCEDURE — 80050 GENERAL HEALTH PANEL: CPT

## 2023-12-28 PROCEDURE — 80061 LIPID PANEL: CPT

## 2023-12-28 PROCEDURE — 36415 COLL VENOUS BLD VENIPUNCTURE: CPT

## 2023-12-28 PROCEDURE — 84439 ASSAY OF FREE THYROXINE: CPT

## 2023-12-29 ENCOUNTER — TELEPHONE (OUTPATIENT)
Dept: FAMILY MEDICINE CLINIC | Facility: CLINIC | Age: 68
End: 2023-12-29
Payer: COMMERCIAL

## 2023-12-29 DIAGNOSIS — E83.51 LOW CALCIUM LEVELS: Primary | ICD-10-CM

## 2023-12-29 DIAGNOSIS — R74.8 ELEVATED ALKALINE PHOSPHATASE LEVEL: Primary | ICD-10-CM

## 2023-12-29 LAB — GGT SERPL-CCNC: 12 U/L (ref 5–36)

## 2024-01-05 ENCOUNTER — TELEPHONE (OUTPATIENT)
Dept: FAMILY MEDICINE CLINIC | Facility: CLINIC | Age: 69
End: 2024-01-05
Payer: COMMERCIAL

## 2024-01-05 NOTE — TELEPHONE ENCOUNTER
Pharmacy Name: [facility] [id number] - [place], [place] - [address] [facility] [address] - [phone]  - [phone]      Pharmacy representative name: KATERIN     Pharmacy representative phone number: [phone]    What medication are you calling in regards to: Calcium Citrate-Vitamin D 250-2.5 MG-MCG per tablet     What question does the pharmacy have: DOSAGE IS NOT AVAILABLE. WANTING TO KNOW IF IT CAN BE ANY DOSAGE OR THAT ONE SPECIFICALLY.     Who is the provider that prescribed the medication: MARY

## 2024-02-09 ENCOUNTER — OFFICE VISIT (OUTPATIENT)
Dept: ORTHOPEDIC SURGERY | Facility: CLINIC | Age: 69
End: 2024-02-09
Payer: COMMERCIAL

## 2024-02-09 VITALS — BODY MASS INDEX: 48.07 KG/M2 | HEIGHT: 58 IN | WEIGHT: 229 LBS

## 2024-02-09 DIAGNOSIS — M17.0 BILATERAL PRIMARY OSTEOARTHRITIS OF KNEE: Primary | ICD-10-CM

## 2024-02-09 PROCEDURE — 20610 DRAIN/INJ JOINT/BURSA W/O US: CPT | Performed by: PHYSICIAN ASSISTANT

## 2024-02-09 RX ADMIN — TRIAMCINOLONE ACETONIDE 40 MG: 40 INJECTION, SUSPENSION INTRA-ARTICULAR; INTRAMUSCULAR at 16:23

## 2024-02-09 RX ADMIN — LIDOCAINE HYDROCHLORIDE 5 ML: 10 INJECTION, SOLUTION INFILTRATION; PERINEURAL at 16:23

## 2024-02-12 RX ORDER — LIDOCAINE HYDROCHLORIDE 10 MG/ML
5 INJECTION, SOLUTION INFILTRATION; PERINEURAL
Status: COMPLETED | OUTPATIENT
Start: 2024-02-09 | End: 2024-02-09

## 2024-02-12 RX ORDER — TRIAMCINOLONE ACETONIDE 40 MG/ML
40 INJECTION, SUSPENSION INTRA-ARTICULAR; INTRAMUSCULAR
Status: COMPLETED | OUTPATIENT
Start: 2024-02-09 | End: 2024-02-09

## 2024-02-22 ENCOUNTER — TELEPHONE (OUTPATIENT)
Dept: FAMILY MEDICINE CLINIC | Facility: CLINIC | Age: 69
End: 2024-02-22
Payer: COMMERCIAL

## 2024-02-22 NOTE — TELEPHONE ENCOUNTER
OK FOR HUB TO RELAY:    Lvm reminding patient about overdue labs that are still pending from 12/29/2023    Just a friendly reminder that you have an active lab order pending from your last office visit. This order is in your chart. Simply go to any [facility] lab at your convenience to have this completed - no appointment is needed.    Thank you!

## 2024-03-22 ENCOUNTER — OFFICE VISIT (OUTPATIENT)
Dept: FAMILY MEDICINE CLINIC | Facility: CLINIC | Age: 69
End: 2024-03-22
Payer: COMMERCIAL

## 2024-03-22 VITALS
SYSTOLIC BLOOD PRESSURE: 142 MMHG | OXYGEN SATURATION: 100 % | DIASTOLIC BLOOD PRESSURE: 69 MMHG | HEIGHT: 58 IN | BODY MASS INDEX: 48.91 KG/M2 | WEIGHT: 233 LBS | HEART RATE: 64 BPM

## 2024-03-22 DIAGNOSIS — Z91.09 ENVIRONMENTAL ALLERGIES: ICD-10-CM

## 2024-03-22 DIAGNOSIS — R05.8 ALLERGIC COUGH: ICD-10-CM

## 2024-03-22 DIAGNOSIS — J02.9 SORE THROAT: Primary | ICD-10-CM

## 2024-03-22 LAB
EXPIRATION DATE: NORMAL
INTERNAL CONTROL: NORMAL
Lab: 8725
S PYO AG THROAT QL: NEGATIVE

## 2024-03-22 PROCEDURE — 87880 STREP A ASSAY W/OPTIC: CPT

## 2024-03-22 PROCEDURE — 99213 OFFICE O/P EST LOW 20 MIN: CPT

## 2024-03-22 RX ORDER — BENZONATATE 100 MG/1
100 CAPSULE ORAL 3 TIMES DAILY PRN
Qty: 30 CAPSULE | Refills: 0 | Status: SHIPPED | OUTPATIENT
Start: 2024-03-22

## 2024-03-22 RX ORDER — CETIRIZINE HYDROCHLORIDE 10 MG/1
10 TABLET ORAL DAILY
Qty: 90 TABLET | Refills: 1 | Status: SHIPPED | OUTPATIENT
Start: 2024-03-22

## 2024-03-22 NOTE — PROGRESS NOTES
Chief Complaint  Sore Throat and Cough    SUBJECTIVE  Loan Nam presents to [facility]    History of Present Illness  Patient is a 68-year-old female who presents today with complaints of sore throat and a dry cough that started this morning.  Patient not taking any over-the-counter medications.  Patient denies any fever chills body aches.    Patient reports that she was seen by orthopedics and given steroid injections in both of her knees but she is still having pain.  Advise she can follow-up with them.    Patient still has pending mammogram and [facility] reports that she is able to schedule them and will do so.     Past Medical History:   Diagnosis Date    Acid reflux     Allergic rhinitis     Anemia     unspecified    Arthritis     2016 left knee    Asthma     Bile salt-induced diarrhea     Depression     H/O bone density study     never    History of pulmonary embolism     Hypertension     Limb swelling     Moderate episode of recurrent major depressive disorder 2020    Muscle cramp, nocturnal 2018    Pap smear for cervical cancer screening     no longer hysterectomy    Renal insufficiency 2021    Seasonal allergies     SOB (shortness of breath)     Venous insufficiency of left leg 2018    she needs to elevated nad wear compression hose. 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    Visit for screening mammogram 10/2019    normal      Family History   Problem Relation Age of Onset    Hypertension Mother     Cancer Mother     Arthritis Mother     Osteoporosis Mother     Hypertension Father     Cancer Father     Other Father         blood diseases    Hypertension Sister     Cancer Sister     Arthritis Sister       Past Surgical History:   Procedure Laterality Date    APPENDECTOMY       SECTION      CHOLECYSTECTOMY  1977    COLONOSCOPY  2014    normal    GALLBLADDER SURGERY      HYSTERECTOMY  2004    OTHER SURGICAL  "HISTORY      mass removal on neck    PLANTAR FASCIA SURGERY Left     TONSILLECTOMY          Current Outpatient Medications:     albuterol sulfate  (90 Base) MCG/ACT inhaler, Inhale 2 puffs Every 4 (Four) Hours As Needed for Wheezing., Disp: 18 g, Rfl: 2    busPIRone (BUSPAR) 15 MG tablet, Take 1 tablet by mouth 2 (Two) Times a Day for 180 days., Disp: 180 tablet, Rfl: 1    Calcium Citrate-Vitamin D 250-2.5 MG-MCG per tablet, Take 1 tablet by mouth 2 (Two) Times a Day., Disp: 180 tablet, Rfl: 1    carvedilol (COREG) 3.125 MG tablet, Take 2 tabs PO in the am and one tab pm, Disp: 270 tablet, Rfl: 1    hydroCHLOROthiazide (HYDRODIURIL) 25 MG tablet, Take 1 tablet by mouth Daily for 180 days., Disp: 90 tablet, Rfl: 1    losartan (Cozaar) 50 MG tablet, Take 2 tablets by mouth Daily for 180 days., Disp: 180 tablet, Rfl: 1    benzonatate (Tessalon Perles) 100 MG capsule, Take 1 capsule by mouth 3 (Three) Times a Day As Needed for Cough., Disp: 30 capsule, Rfl: 0    cetirizine (zyrTEC) 10 MG tablet, Take 1 tablet by mouth Daily., Disp: 90 tablet, Rfl: 1    OBJECTIVE  Vital Signs:   /69   Pulse 64   Ht 147.3 cm (58\")   Wt 106 kg (233 lb)   SpO2 100%   BMI 48.70 kg/m²    Estimated body mass index is 48.7 kg/m² as calculated from the following:    Height as of this encounter: 147.3 cm (58\").    Weight as of this encounter: 106 kg (233 lb).     Wt Readings from Last 3 Encounters:   03/22/24 106 kg (233 lb)   02/29/24 104 kg (228 lb 3.2 oz)   02/09/24 104 kg (229 lb)     BP Readings from Last 3 Encounters:   03/22/24 142/69   02/29/24 (!) 196/88   12/14/23 143/78       Physical Exam  Vitals reviewed.   Constitutional:       General: She is not in acute distress.     Appearance: She is not ill-appearing.   HENT:      Head: Normocephalic and atraumatic.      Right Ear: Tympanic membrane, ear canal and external ear normal.      Left Ear: Tympanic membrane, ear canal and external ear normal.      Nose: Rhinorrhea " present.      Mouth/Throat:      Mouth: Mucous membranes are moist.      Pharynx: Oropharynx is clear. No oropharyngeal exudate or posterior oropharyngeal erythema.   Eyes:      Conjunctiva/sclera: Conjunctivae normal.   Cardiovascular:      Rate and Rhythm: Normal rate and regular rhythm.      Heart sounds: Normal heart sounds.   Pulmonary:      Effort: Pulmonary effort is normal.      Breath sounds: Normal breath sounds.   Musculoskeletal:      Cervical back: Normal range of motion.   Skin:     General: Skin is warm and dry.   Neurological:      Mental Status: She is alert and oriented to person, place, and time.   Psychiatric:         Mood and Affect: Mood normal.         Behavior: Behavior normal.         Thought Content: Thought content normal.         Judgment: Judgment normal.          Result Review    Common labs          12/28/2023    07:14   Common Labs   Glucose 96    BUN 17    Creatinine 1.01    Sodium 143    Potassium 3.6    Chloride 105    Calcium 8.3    Albumin 4.1    Total Bilirubin 0.5    Alkaline Phosphatase 141    AST (SGOT) 11    ALT (SGPT) 12    WBC 10.71    Hemoglobin 12.5    Hematocrit 36.7    Platelets 289    Total Cholesterol 144    Triglycerides 91    HDL Cholesterol 34    LDL Cholesterol  93        No Images in the past 120 days found..      The above data has been reviewed by ARIN Goldberg 03/22/2024 13:04 EDT.          Patient Care Team:  Cyndie Bess APRN as PCP - General (Nurse Practitioner)            ASSESSMENT & PLAN    Diagnoses and all orders for this visit:    1. Sore throat (Primary)  -     POCT rapid strep A    2. Environmental allergies  -     cetirizine (zyrTEC) 10 MG tablet; Take 1 tablet by mouth Daily.  Dispense: 90 tablet; Refill: 1    3. Allergic cough  -     benzonatate (Tessalon Perles) 100 MG capsule; Take 1 capsule by mouth 3 (Three) Times a Day As Needed for Cough.  Dispense: 30 capsule; Refill: 0         Tobacco Use: Low Risk  (3/22/2024)    Patient  History     Smoking Tobacco Use: Never     Smokeless Tobacco Use: Never     Passive Exposure: Never       Follow Up     Return if symptoms worsen or fail to improve.      Patient was given instructions and counseling regarding her condition or for health maintenance advice. Please see specific information pulled into the AVS if appropriate.   I have reviewed information obtained and documented by others and I have confirmed the accuracy of this documented note.    ARIN Goldberg

## 2024-04-25 ENCOUNTER — OFFICE VISIT (OUTPATIENT)
Dept: FAMILY MEDICINE CLINIC | Facility: CLINIC | Age: 69
End: 2024-04-25
Payer: COMMERCIAL

## 2024-04-25 VITALS
SYSTOLIC BLOOD PRESSURE: 154 MMHG | DIASTOLIC BLOOD PRESSURE: 85 MMHG | HEART RATE: 75 BPM | BODY MASS INDEX: 48.66 KG/M2 | WEIGHT: 231.8 LBS | HEIGHT: 58 IN

## 2024-04-25 DIAGNOSIS — N28.9 RENAL INSUFFICIENCY: ICD-10-CM

## 2024-04-25 DIAGNOSIS — M17.0 OSTEOARTHRITIS OF BOTH KNEES, UNSPECIFIED OSTEOARTHRITIS TYPE: ICD-10-CM

## 2024-04-25 DIAGNOSIS — M17.0 OSTEOARTHRITIS OF BOTH KNEES, UNSPECIFIED OSTEOARTHRITIS TYPE: Primary | ICD-10-CM

## 2024-04-25 PROCEDURE — 99214 OFFICE O/P EST MOD 30 MIN: CPT

## 2024-04-25 RX ORDER — LIDOCAINE 50 MG/G
2 PATCH TOPICAL EVERY 24 HOURS
Qty: 30 EACH | Refills: 3 | Status: SHIPPED | OUTPATIENT
Start: 2024-04-25

## 2024-04-25 RX ORDER — LIDOCAINE 50 MG/G
2 PATCH TOPICAL EVERY 24 HOURS
Qty: 30 EACH | Refills: 3 | OUTPATIENT
Start: 2024-04-25

## 2024-04-25 NOTE — PROGRESS NOTES
Chief Complaint  Knee Pain (Right /)    SUBJECTIVE  Loan Nam presents to [facility]    History of Present Illness  Pt c/o of right knee pain and swelling for 2-3 weeks.  Patient reports pain yesterday at work was excruciating.  Reports right knee is worse than the left.  She presents today with a knee brace on her right knee.  She reports the pain is worse with ambulation.  Pt has an appt w ortho 5/10/23. She says that she has been taking tylenol arthritis and has been icing it without relief.  This is a chronic issue for her. She had to miss work today because of it. We have avoided NSAIDS in the past due to hx of renal insufficiency but last Cmp was normal.    Past Medical History:   Diagnosis Date    Acid reflux     Allergic rhinitis     Anemia     unspecified    Arthritis     2016 left knee    Asthma     Bile salt-induced diarrhea     Depression     H/O bone density study     never    History of pulmonary embolism     Hypertension     Limb swelling     Moderate episode of recurrent major depressive disorder 2020    Muscle cramp, nocturnal 2018    Pap smear for cervical cancer screening     no longer hysterectomy    Renal insufficiency 2021    Seasonal allergies     SOB (shortness of breath)     Venous insufficiency of left leg 2018    she needs to elevated nad wear compression hose. 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    Visit for screening mammogram 10/2019    normal      Family History   Problem Relation Age of Onset    Hypertension Mother     Cancer Mother     Arthritis Mother     Osteoporosis Mother     Hypertension Father     Cancer Father     Other Father         blood diseases    Hypertension Sister     Cancer Sister     Arthritis Sister       Past Surgical History:   Procedure Laterality Date    APPENDECTOMY       SECTION      CHOLECYSTECTOMY      COLONOSCOPY  2014    normal    GALLBLADDER  "SURGERY      HYSTERECTOMY  2004    OTHER SURGICAL HISTORY      mass removal on neck    PLANTAR FASCIA SURGERY Left     TONSILLECTOMY          Current Outpatient Medications:     albuterol sulfate  (90 Base) MCG/ACT inhaler, Inhale 2 puffs Every 4 (Four) Hours As Needed for Wheezing., Disp: 18 g, Rfl: 2    busPIRone (BUSPAR) 15 MG tablet, Take 1 tablet by mouth 2 (Two) Times a Day for 180 days., Disp: 180 tablet, Rfl: 1    carvedilol (COREG) 3.125 MG tablet, Take 2 tabs PO in the am and one tab pm, Disp: 270 tablet, Rfl: 1    cetirizine (zyrTEC) 10 MG tablet, Take 1 tablet by mouth Daily. (Patient taking differently: Take 1 tablet by mouth Daily. Prn), Disp: 90 tablet, Rfl: 1    hydroCHLOROthiazide (HYDRODIURIL) 25 MG tablet, Take 1 tablet by mouth Daily for 180 days., Disp: 90 tablet, Rfl: 1    losartan (Cozaar) 50 MG tablet, Take 2 tablets by mouth Daily for 180 days., Disp: 180 tablet, Rfl: 1    Calcium Citrate-Vitamin D 250-2.5 MG-MCG per tablet, Take 1 tablet by mouth 2 (Two) Times a Day. (Patient not taking: Reported on 4/25/2024), Disp: 180 tablet, Rfl: 1    diclofenac (VOLTAREN) 50 MG EC tablet, Take 1 tablet by mouth Daily As Needed (knee pain)., Disp: 30 tablet, Rfl: 0    lidocaine (LIDODERM) 5 %, Place 2 patches on the skin as directed by provider Daily. Remove & Discard patch within 12 hours or as directed by MD, Disp: 30 each, Rfl: 3    OBJECTIVE  Vital Signs:   /85   Pulse 75   Ht 147.3 cm (58\")   Wt 105 kg (231 lb 12.8 oz)   BMI 48.45 kg/m²    Estimated body mass index is 48.45 kg/m² as calculated from the following:    Height as of this encounter: 147.3 cm (58\").    Weight as of this encounter: 105 kg (231 lb 12.8 oz).     Wt Readings from Last 3 Encounters:   04/25/24 105 kg (231 lb 12.8 oz)   03/22/24 106 kg (233 lb)   02/29/24 104 kg (228 lb 3.2 oz)     BP Readings from Last 3 Encounters:   04/25/24 154/85   03/22/24 142/69   02/29/24 (!) 196/88       Physical Exam  Vitals " reviewed.   Constitutional:       General: She is not in acute distress.     Appearance: She is not ill-appearing.   HENT:      Head: Normocephalic and atraumatic.   Eyes:      Conjunctiva/sclera: Conjunctivae normal.   Pulmonary:      Effort: Pulmonary effort is normal.   Musculoskeletal:      Right knee: Swelling present. Tenderness present.      Left knee: Swelling present. Tenderness present.   Skin:     General: Skin is warm and dry.   Neurological:      Mental Status: She is alert.          Result Review    Common labs          12/28/2023    07:14   Common Labs   Glucose 96    BUN 17    Creatinine 1.01    Sodium 143    Potassium 3.6    Chloride 105    Calcium 8.3    Albumin 4.1    Total Bilirubin 0.5    Alkaline Phosphatase 141    AST (SGOT) 11    ALT (SGPT) 12    WBC 10.71    Hemoglobin 12.5    Hematocrit 36.7    Platelets 289    Total Cholesterol 144    Triglycerides 91    HDL Cholesterol 34    LDL Cholesterol  93        No Images in the past 120 days found..      The above data has been reviewed by ARIN Goldberg 04/25/2024 09:45 EDT.          Patient Care Team:  Cyndie Bess APRN as PCP - General (Nurse Practitioner)            ASSESSMENT & PLAN    Diagnoses and all orders for this visit:    1. Osteoarthritis of both knees, unspecified osteoarthritis type (Primary)  Comments:  we will try decreased dose short term voltaren with close monitoring of renal function. also try lidocaine patches.  Orders:  -     lidocaine (LIDODERM) 5 %; Place 2 patches on the skin as directed by provider Daily. Remove & Discard patch within 12 hours or as directed by MD  Dispense: 30 each; Refill: 3  -     diclofenac (VOLTAREN) 50 MG EC tablet; Take 1 tablet by mouth Daily As Needed (knee pain).  Dispense: 30 tablet; Refill: 0    2. Renal insufficiency  Comments:  renal panel in 1 month  Orders:  -     Renal Function Panel; Future         Tobacco Use: Low Risk  (4/25/2024)    Patient History     Smoking Tobacco Use:  Never     Smokeless Tobacco Use: Never     Passive Exposure: Never       Follow Up     Return if symptoms worsen or fail to improve.      Patient was given instructions and counseling regarding her condition or for health maintenance advice. Please see specific information pulled into the AVS if appropriate.   I have reviewed information obtained and documented by others and I have confirmed the accuracy of this documented note.    ARIN Goldberg

## 2024-04-25 NOTE — TELEPHONE ENCOUNTER
Caller: Loan Nam    Relationship: Self    Best call back number: [phone]     Requested Prescriptions:   Requested Prescriptions     Pending Prescriptions Disp Refills    lidocaine (LIDODERM) 5 % 30 each 3     Sig: Place 2 patches on the skin as directed by provider Daily. Remove & Discard patch within 12 hours or as directed by MD    diclofenac (VOLTAREN) 50 MG EC tablet 30 tablet 0     Sig: Take 1 tablet by mouth Daily As Needed (knee pain).        [facility] - [phone] [facility] [phone] FX     Last office visit with prescribing clinician: 4/25/2024   Last telemedicine visit with prescribing clinician: Visit date not found   Next office visit with prescribing clinician: Visit date not found     Does the patient have less than a 3 day supply:  [x] Yes  [] No    Would you like a call back once the refill request has been completed: [] Yes [] No    If the office needs to give you a call back, can they leave a voicemail: [] Yes [] No    Devora Cooper Rep   04/25/24 12:04 EDT

## 2024-05-02 DIAGNOSIS — J45.909 UNCOMPLICATED ASTHMA, UNSPECIFIED ASTHMA SEVERITY, UNSPECIFIED WHETHER PERSISTENT: ICD-10-CM

## 2024-05-07 RX ORDER — ALBUTEROL SULFATE 90 UG/1
2 AEROSOL, METERED RESPIRATORY (INHALATION) EVERY 4 HOURS PRN
Qty: 18 G | Refills: 2 | Status: SHIPPED | OUTPATIENT
Start: 2024-05-07

## 2024-05-10 ENCOUNTER — OFFICE VISIT (OUTPATIENT)
Dept: ORTHOPEDIC SURGERY | Facility: CLINIC | Age: 69
End: 2024-05-10
Payer: COMMERCIAL

## 2024-05-10 VITALS
DIASTOLIC BLOOD PRESSURE: 92 MMHG | WEIGHT: 231 LBS | BODY MASS INDEX: 48.49 KG/M2 | OXYGEN SATURATION: 97 % | HEIGHT: 58 IN | HEART RATE: 101 BPM | SYSTOLIC BLOOD PRESSURE: 157 MMHG

## 2024-05-10 DIAGNOSIS — M17.0 BILATERAL PRIMARY OSTEOARTHRITIS OF KNEE: Primary | ICD-10-CM

## 2024-05-10 RX ORDER — TRIAMCINOLONE ACETONIDE 40 MG/ML
40 INJECTION, SUSPENSION INTRA-ARTICULAR; INTRAMUSCULAR
Status: COMPLETED | OUTPATIENT
Start: 2024-05-10 | End: 2024-05-10

## 2024-05-10 RX ORDER — LIDOCAINE HYDROCHLORIDE 10 MG/ML
5 INJECTION, SOLUTION INFILTRATION; PERINEURAL
Status: COMPLETED | OUTPATIENT
Start: 2024-05-10 | End: 2024-05-10

## 2024-05-10 RX ADMIN — LIDOCAINE HYDROCHLORIDE 5 ML: 10 INJECTION, SOLUTION INFILTRATION; PERINEURAL at 14:51

## 2024-05-10 RX ADMIN — TRIAMCINOLONE ACETONIDE 40 MG: 40 INJECTION, SUSPENSION INTRA-ARTICULAR; INTRAMUSCULAR at 14:51

## 2024-05-10 RX ADMIN — TRIAMCINOLONE ACETONIDE 40 MG: 40 INJECTION, SUSPENSION INTRA-ARTICULAR; INTRAMUSCULAR at 14:50

## 2024-05-10 RX ADMIN — LIDOCAINE HYDROCHLORIDE 5 ML: 10 INJECTION, SOLUTION INFILTRATION; PERINEURAL at 14:50

## 2024-06-26 ENCOUNTER — TELEPHONE (OUTPATIENT)
Dept: FAMILY MEDICINE CLINIC | Facility: CLINIC | Age: 69
End: 2024-06-26
Payer: COMMERCIAL

## 2024-08-19 NOTE — TELEPHONE ENCOUNTER
Caller: Loan Nam    Relationship: Self    Best call back number: [phone]     What medication are you requesting: LOSARTAN       If a prescription is needed, what is your preferred pharmacy and phone number: [facility] [id number] - [place], [place] - [address] [facility] [address] [phone] [facility] [phone] FX     Additional notes:PATIENT MADE AN APPOINTMENT FOR 8.22.24 BUT WILL RUN OUT OF THIS BEFORE THEN AND ASKS FOR A REFILL.

## 2024-08-20 RX ORDER — LOSARTAN POTASSIUM 50 MG/1
100 TABLET ORAL DAILY
Qty: 30 TABLET | Refills: 0 | Status: SHIPPED | OUTPATIENT
Start: 2024-08-20 | End: 2024-08-22 | Stop reason: SDUPTHER

## 2024-08-22 ENCOUNTER — OFFICE VISIT (OUTPATIENT)
Dept: FAMILY MEDICINE CLINIC | Facility: CLINIC | Age: 69
End: 2024-08-22
Payer: COMMERCIAL

## 2024-08-22 VITALS
HEIGHT: 58 IN | SYSTOLIC BLOOD PRESSURE: 143 MMHG | WEIGHT: 233 LBS | BODY MASS INDEX: 48.91 KG/M2 | HEART RATE: 92 BPM | DIASTOLIC BLOOD PRESSURE: 68 MMHG

## 2024-08-22 DIAGNOSIS — K21.9 GASTROESOPHAGEAL REFLUX DISEASE, UNSPECIFIED WHETHER ESOPHAGITIS PRESENT: ICD-10-CM

## 2024-08-22 DIAGNOSIS — J45.909 UNCOMPLICATED ASTHMA, UNSPECIFIED ASTHMA SEVERITY, UNSPECIFIED WHETHER PERSISTENT: ICD-10-CM

## 2024-08-22 DIAGNOSIS — Z12.11 SCREENING FOR COLON CANCER: ICD-10-CM

## 2024-08-22 DIAGNOSIS — Z13.220 SCREENING FOR LIPID DISORDERS: ICD-10-CM

## 2024-08-22 DIAGNOSIS — I10 PRIMARY HYPERTENSION: Primary | ICD-10-CM

## 2024-08-22 DIAGNOSIS — F41.9 ANXIETY: ICD-10-CM

## 2024-08-22 DIAGNOSIS — Z13.29 SCREENING FOR THYROID DISORDER: ICD-10-CM

## 2024-08-22 PROCEDURE — 99214 OFFICE O/P EST MOD 30 MIN: CPT

## 2024-08-22 RX ORDER — CARVEDILOL 3.12 MG/1
TABLET ORAL
Qty: 270 TABLET | Refills: 1 | Status: SHIPPED | OUTPATIENT
Start: 2024-08-22

## 2024-08-22 RX ORDER — BUSPIRONE HYDROCHLORIDE 15 MG/1
15 TABLET ORAL 2 TIMES DAILY
Qty: 180 TABLET | Refills: 1 | Status: SHIPPED | OUTPATIENT
Start: 2024-08-22 | End: 2025-02-18

## 2024-08-22 RX ORDER — ALBUTEROL SULFATE 90 UG/1
2 AEROSOL, METERED RESPIRATORY (INHALATION) EVERY 4 HOURS PRN
Qty: 18 G | Refills: 2 | Status: SHIPPED | OUTPATIENT
Start: 2024-08-22

## 2024-08-22 RX ORDER — OMEPRAZOLE 40 MG/1
40 CAPSULE, DELAYED RELEASE ORAL DAILY
Qty: 90 CAPSULE | Refills: 1 | Status: SHIPPED | OUTPATIENT
Start: 2024-08-22

## 2024-08-22 RX ORDER — LOSARTAN POTASSIUM 50 MG/1
100 TABLET ORAL DAILY
Qty: 180 TABLET | Refills: 1 | Status: SHIPPED | OUTPATIENT
Start: 2024-08-22

## 2024-08-22 RX ORDER — OMEPRAZOLE 40 MG/1
40 CAPSULE, DELAYED RELEASE ORAL DAILY
COMMUNITY
End: 2024-08-22 | Stop reason: SDUPTHER

## 2024-08-22 NOTE — PROGRESS NOTES
Chief Complaint  Asthma, Anxiety, Hypertension, Arthritis, and Heartburn    SUBJECTIVE  Loan Nam presents to [facility]    History of Present Illness  Pt is a f/u for asthma, anxiety, HTN, GERD, and arthritis. No issues or concerns at this time.     Asthma-patient states that she was unable to get a refill of albuterol from the pharmacy.  Will resend today.    Anxiety-patient currently on BuSpar and feel stable on current dose.    Hypertension-patient is currently on Coreg 3.125 mg, HCTZ 25 mg, losartan 50 mg.  Patient blood pressure controlled at 143/68.  Denies any chest pain headache blurry vision.    GERD-patient is currently on Prilosec 40 mg.  Patient reports controls her reflux well.    Arthritis-patient is established with Dr. Owens with Ortho.  She is due for steroid injection and has an appointment coming up soon.    Pt is due shingles, tdap, vaccines. Pt declines and understands the risks of not having.     Mammogram scheduled 10/14/24    Pt is due dexa. Pt declines and understands the risks of not having.     Pt is due colon screening 12/16/24. Pt requests Dr. Crabtree.     Past Medical History:   Diagnosis Date    Acid reflux     Allergic rhinitis     Anemia     unspecified    Arthritis     7/28/2016 left knee    Asthma     Bile salt-induced diarrhea     Depression     H/O bone density study     never    History of pulmonary embolism     Hypertension     Limb swelling     Moderate episode of recurrent major depressive disorder 02/03/2020    Muscle cramp, nocturnal 07/17/2018    Pap smear for cervical cancer screening 2000    no longer hysterectomy    Renal insufficiency 04/12/2021    Seasonal allergies     SOB (shortness of breath)     Venous insufficiency of left leg 07/17/2018    she needs to elevated nad wear compression hose. 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    Visit for screening mammogram 10/2019    normal      Family History   Problem  "Relation Age of Onset    Hypertension Mother     Cancer Mother     Arthritis Mother     Osteoporosis Mother     Hypertension Father     Cancer Father     Other Father         blood diseases    Hypertension Sister     Cancer Sister     Arthritis Sister       Past Surgical History:   Procedure Laterality Date    APPENDECTOMY       SECTION      CHOLECYSTECTOMY      COLONOSCOPY  2014    normal    GALLBLADDER SURGERY      HYSTERECTOMY      OTHER SURGICAL HISTORY      mass removal on neck    PLANTAR FASCIA SURGERY Left     TONSILLECTOMY          Current Outpatient Medications:     albuterol sulfate  (90 Base) MCG/ACT inhaler, Inhale 2 puffs Every 4 (Four) Hours As Needed for Wheezing., Disp: 18 g, Rfl: 2    busPIRone (BUSPAR) 15 MG tablet, Take 1 tablet by mouth 2 (Two) Times a Day for 180 days., Disp: 180 tablet, Rfl: 1    carvedilol (COREG) 3.125 MG tablet, Take 2 tabs PO in the am and one tab pm, Disp: 270 tablet, Rfl: 1    hydroCHLOROthiazide (HYDRODIURIL) 25 MG tablet, Take 1 tablet by mouth Daily for 180 days., Disp: 90 tablet, Rfl: 1    losartan (Cozaar) 50 MG tablet, Take 2 tablets by mouth Daily., Disp: 180 tablet, Rfl: 1    omeprazole (priLOSEC) 40 MG capsule, Take 1 capsule by mouth Daily., Disp: 90 capsule, Rfl: 1    OBJECTIVE  Vital Signs:   /68   Pulse 92   Ht 147.3 cm (58\")   Wt 106 kg (233 lb)   BMI 48.70 kg/m²    Estimated body mass index is 48.7 kg/m² as calculated from the following:    Height as of this encounter: 147.3 cm (58\").    Weight as of this encounter: 106 kg (233 lb).     Wt Readings from Last 3 Encounters:   24 106 kg (233 lb)   05/10/24 105 kg (231 lb)   24 105 kg (231 lb 12.8 oz)     BP Readings from Last 3 Encounters:   24 143/68   05/10/24 157/92   24 154/85       Physical Exam  Vitals reviewed.   Constitutional:       General: She is not in acute distress.     Appearance: She is morbidly obese. She is not ill-appearing. "   HENT:      Head: Normocephalic and atraumatic.   Eyes:      Conjunctiva/sclera: Conjunctivae normal.   Cardiovascular:      Rate and Rhythm: Normal rate and regular rhythm.      Heart sounds: Normal heart sounds.   Pulmonary:      Effort: Pulmonary effort is normal.      Breath sounds: Normal breath sounds.   Skin:     General: Skin is warm and dry.   Neurological:      Mental Status: She is alert and oriented to person, place, and time.   Psychiatric:         Mood and Affect: Mood normal.         Behavior: Behavior normal.         Thought Content: Thought content normal.         Judgment: Judgment normal.                Result Review    Common labs          12/28/2023    07:14   Common Labs   Glucose 96    BUN 17    Creatinine 1.01    Sodium 143    Potassium 3.6    Chloride 105    Calcium 8.3    Albumin 4.1    Total Bilirubin 0.5    Alkaline Phosphatase 141    AST (SGOT) 11    ALT (SGPT) 12    WBC 10.71    Hemoglobin 12.5    Hematocrit 36.7    Platelets 289    Total Cholesterol 144    Triglycerides 91    HDL Cholesterol 34    LDL Cholesterol  93        No Images in the past 120 days found..      The above data has been reviewed by ARIN Goldberg 08/22/2024 15:23 EDT.          Patient Care Team:  Cyndie Bess APRN as PCP - General (Nurse Practitioner)           ASSESSMENT & PLAN    Diagnoses and all orders for this visit:    1. Primary hypertension (Primary)  Comments:  Patient is currently on Cozaar 50 mg, Coreg 3.125 mg twice daily and hydrochlorothiazide 25 mg. stable on these medicines at this time.  Orders:  -     losartan (Cozaar) 50 MG tablet; Take 2 tablets by mouth Daily.  Dispense: 180 tablet; Refill: 1  -     carvedilol (COREG) 3.125 MG tablet; Take 2 tabs PO in the am and one tab pm  Dispense: 270 tablet; Refill: 1  -     Comprehensive Metabolic Panel; Future  -     CBC & Differential; Future  -     Lipid Panel; Future    2. Anxiety  Comments:  No changes in medicines at this time have  reordered BuSpar.  Orders:  -     busPIRone (BUSPAR) 15 MG tablet; Take 1 tablet by mouth 2 (Two) Times a Day for 180 days.  Dispense: 180 tablet; Refill: 1    3. Uncomplicated asthma, unspecified asthma severity, unspecified whether persistent  Comments:  have reordered her inhaler to be sent to pharmacy.   Orders:  -     albuterol sulfate  (90 Base) MCG/ACT inhaler; Inhale 2 puffs Every 4 (Four) Hours As Needed for Wheezing.  Dispense: 18 g; Refill: 2    4. Gastroesophageal reflux disease, unspecified whether esophagitis present  Comments:  stable on prilosec 40 mg, continue  Orders:  -     omeprazole (priLOSEC) 40 MG capsule; Take 1 capsule by mouth Daily.  Dispense: 90 capsule; Refill: 1    5. Screening for colon cancer  -     Ambulatory Referral to General Surgery    6. Screening for thyroid disorder  -     TSH+Free T4; Future    7. Screening for lipid disorders  -     Lipid Panel; Future         Tobacco Use: Low Risk  (8/22/2024)    Patient History     Smoking Tobacco Use: Never     Smokeless Tobacco Use: Never     Passive Exposure: Never       Follow Up     Return in about 6 months (around 2/22/2025) for Next scheduled follow up.      Patient was given instructions and counseling regarding her condition or for health maintenance advice. Please see specific information pulled into the AVS if appropriate.   I have reviewed information obtained and documented by others and I have confirmed the accuracy of this documented note.    ARIN Goldberg

## 2024-09-06 ENCOUNTER — OFFICE VISIT (OUTPATIENT)
Dept: ORTHOPEDIC SURGERY | Facility: CLINIC | Age: 69
End: 2024-09-06
Payer: COMMERCIAL

## 2024-09-06 ENCOUNTER — OFFICE VISIT (OUTPATIENT)
Dept: FAMILY MEDICINE CLINIC | Facility: CLINIC | Age: 69
End: 2024-09-06
Payer: COMMERCIAL

## 2024-09-06 VITALS
HEIGHT: 58 IN | BODY MASS INDEX: 49.33 KG/M2 | OXYGEN SATURATION: 96 % | HEART RATE: 78 BPM | WEIGHT: 235 LBS | DIASTOLIC BLOOD PRESSURE: 81 MMHG | SYSTOLIC BLOOD PRESSURE: 152 MMHG

## 2024-09-06 VITALS
OXYGEN SATURATION: 98 % | HEART RATE: 68 BPM | DIASTOLIC BLOOD PRESSURE: 65 MMHG | BODY MASS INDEX: 49.16 KG/M2 | WEIGHT: 235.2 LBS | SYSTOLIC BLOOD PRESSURE: 137 MMHG

## 2024-09-06 DIAGNOSIS — M17.0 BILATERAL PRIMARY OSTEOARTHRITIS OF KNEE: Primary | ICD-10-CM

## 2024-09-06 DIAGNOSIS — N30.01 ACUTE CYSTITIS WITH HEMATURIA: ICD-10-CM

## 2024-09-06 DIAGNOSIS — R10.9 FLANK PAIN: Primary | ICD-10-CM

## 2024-09-06 LAB
BILIRUB BLD-MCNC: ABNORMAL MG/DL
CLARITY, POC: ABNORMAL
COLOR UR: YELLOW
EXPIRATION DATE: ABNORMAL
GLUCOSE UR STRIP-MCNC: NEGATIVE MG/DL
KETONES UR QL: NEGATIVE
LEUKOCYTE EST, POC: ABNORMAL
Lab: ABNORMAL
NITRITE UR-MCNC: NEGATIVE MG/ML
PH UR: 5.5 [PH] (ref 5–8)
PROT UR STRIP-MCNC: ABNORMAL MG/DL
RBC # UR STRIP: ABNORMAL /UL
SP GR UR: 1.02 (ref 1–1.03)
UROBILINOGEN UR QL: ABNORMAL

## 2024-09-06 PROCEDURE — 99213 OFFICE O/P EST LOW 20 MIN: CPT

## 2024-09-06 PROCEDURE — 87086 URINE CULTURE/COLONY COUNT: CPT

## 2024-09-06 PROCEDURE — 81003 URINALYSIS AUTO W/O SCOPE: CPT

## 2024-09-06 RX ORDER — CIPROFLOXACIN 500 MG/1
500 TABLET, FILM COATED ORAL 2 TIMES DAILY
Qty: 10 TABLET | Refills: 0 | Status: SHIPPED | OUTPATIENT
Start: 2024-09-06

## 2024-09-06 RX ORDER — TRIAMCINOLONE ACETONIDE 40 MG/ML
40 INJECTION, SUSPENSION INTRA-ARTICULAR; INTRAMUSCULAR
Status: COMPLETED | OUTPATIENT
Start: 2024-09-06 | End: 2024-09-06

## 2024-09-06 RX ORDER — LIDOCAINE HYDROCHLORIDE 10 MG/ML
5 INJECTION, SOLUTION INFILTRATION; PERINEURAL
Status: COMPLETED | OUTPATIENT
Start: 2024-09-06 | End: 2024-09-06

## 2024-09-06 RX ADMIN — LIDOCAINE HYDROCHLORIDE 5 ML: 10 INJECTION, SOLUTION INFILTRATION; PERINEURAL at 15:22

## 2024-09-06 RX ADMIN — TRIAMCINOLONE ACETONIDE 40 MG: 40 INJECTION, SUSPENSION INTRA-ARTICULAR; INTRAMUSCULAR at 15:22

## 2024-09-06 NOTE — PROGRESS NOTES
"Chief Complaint  Follow-up of the Left Knee and Follow-up of the Right Knee     Subjective      Loan Nam presents to [facility] for follow up of bilateral knees.   She has managed this conservatively with intermittent injections in the past. She previously received bilateral knee steroid injections in office on 5/10/24. She has had increase pain the last few weeks and increase pain with prolonged standing, ambulation and stairs.     Allergies   Allergen Reactions    Contrast Dye (Echo Or Unknown Ct/Mr) Hives    Iodinated Contrast Media Hives    Norvasc [Amlodipine] Shortness Of Breath    Penicillins Swelling    Sulfa Antibiotics Rash        Social History     Socioeconomic History    Marital status:    Tobacco Use    Smoking status: Never     Passive exposure: Never    Smokeless tobacco: Never   Vaping Use    Vaping status: Never Used   Substance and Sexual Activity    Alcohol use: Not Currently    Drug use: Never    Sexual activity: Defer        I reviewed the patient's chief complaint, history of present illness, review of systems, past medical history, surgical history, family history, social history, medications, and allergy list.     Review of Systems     Constitutional: Denies fevers, chills, weight loss  Cardiovascular: Denies chest pain, shortness of breath  Skin: Denies rashes, acute skin changes  Neurologic: Denies headache, loss of consciousness      Vital Signs:   /81   Pulse 78   Ht 147.3 cm (58\")   Wt 107 kg (235 lb)   SpO2 96%   BMI 49.12 kg/m²          Physical Exam  General: Alert. No acute distress    Ortho Exam        Bilateral knees: Skin is warm, dry, and intact. Mild to moderate edema. Multiple bilateral lower extremity varicosities present. Tenderness to palpation of joint lines. -3 to -5 degrees of extension and flexion 115-120 degrees. Full plantarflexion and dorsiflexion of the ankles. Sensation and distal neurovascular intact. Smooth " sit to stand transition. Patient fully weightbearing with nonantalgic gait.       Large Joint Arthrocentesis: R knee  Date/Time: 9/6/2024 3:22 PM  Consent given by: patient  Site marked: site marked  Timeout: Immediately prior to procedure a time out was called to verify the correct patient, procedure, equipment, support staff and site/side marked as required   Supporting Documentation  Indications: pain   Procedure Details  Location: knee - R knee  Preparation: Patient was prepped and draped in the usual sterile fashion  Needle gauge: 21 G.  Medications administered: 5 mL lidocaine 1 %; 40 mg triamcinolone acetonide 40 MG/ML  Patient tolerance: patient tolerated the procedure well with no immediate complications      Large Joint Arthrocentesis: L knee  Date/Time: 9/6/2024 3:22 PM  Consent given by: patient  Site marked: site marked  Timeout: Immediately prior to procedure a time out was called to verify the correct patient, procedure, equipment, support staff and site/side marked as required   Supporting Documentation  Indications: pain   Procedure Details  Location: knee - L knee  Preparation: Patient was prepped and draped in the usual sterile fashion  Needle gauge: 21 G.  Medications administered: 5 mL lidocaine 1 %; 40 mg triamcinolone acetonide 40 MG/ML  Patient tolerance: patient tolerated the procedure well with no immediate complications    This injection documentation was Scribed for Mike Mckinney MD by Sariah Fishman.  09/06/24   15:22 EDT        Imaging Results (Most Recent)       None             Result Review :             Assessment and Plan     Diagnoses and all orders for this visit:    1. Bilateral primary osteoarthritis of knee (Primary)        Discussed the treatment plan with the patient.     Discussed the risks and benefits of conservative measures. The patient expressed understanding and wished to proceed with bilateral knee steroid injections. She tolerated the injections well.          Call or return if worsening symptoms.    Follow Up     PRN      Patient was given instructions and counseling regarding her condition or for health maintenance advice. Please see specific information pulled into the AVS if appropriate.     Scribed for Mike Mckinney MD by Ebony Benito MA.  09/06/24   15:04 EDT    I have personally performed the services described in this document as scribed by the above individual and it is both accurate and complete. Mike Mckinney MD 09/06/24

## 2024-09-06 NOTE — LETTER
September 6, 2024     Patient: Loan Nam   YOB: 1955   Date of Visit: 9/6/2024       To Whom It May Concern:    Loan Nam was seen in office today 9/6/2024. It is my medical opinion that Loan Nam may return to work on 9/9/24.          Sincerely,        ARIN Hollis    CC: No Recipients

## 2024-09-06 NOTE — PROGRESS NOTES
Chief Complaint   Patient presents with    Abdominal Pain    Back Pain    Vaginal Itching    Urinary Tract Infection        Linda Nam  has a past medical history of Acid reflux, Allergic rhinitis, Anemia, Arthritis, Asthma, Bile salt-induced diarrhea, Depression, H/O bone density study, History of pulmonary embolism, Hypertension, Limb swelling, Moderate episode of recurrent major depressive disorder (02/03/2020), Muscle cramp, nocturnal (07/17/2018), Pap smear for cervical cancer screening (2000), Renal insufficiency (04/12/2021), Seasonal allergies, SOB (shortness of breath), Venous insufficiency of left leg (07/17/2018), and Visit for screening mammogram (10/2019).    AMADEO Cates is a 68-year-old female who is a patient of my colleague Cyndie Bess but today I am seeing her for an acute visit.    Today she is reporting suprapubic pain, back pain, vaginal itching, pressure when going to the bathroom, and possible UTI/kidney infection for 2-3 days. States that this is how it normally starts. Will do a UA in office and will culture if necessary.  No issues with BM's.    Allergies   Allergen Reactions    Contrast Dye (Echo Or Unknown Ct/Mr) Hives    Iodinated Contrast Media Hives    Norvasc [Amlodipine] Shortness Of Breath    Penicillins Swelling    Sulfa Antibiotics Rash         Current Outpatient Medications:     albuterol sulfate  (90 Base) MCG/ACT inhaler, Inhale 2 puffs Every 4 (Four) Hours As Needed for Wheezing., Disp: 18 g, Rfl: 2    busPIRone (BUSPAR) 15 MG tablet, Take 1 tablet by mouth 2 (Two) Times a Day for 180 days., Disp: 180 tablet, Rfl: 1    carvedilol (COREG) 3.125 MG tablet, Take 2 tabs PO in the am and one tab pm, Disp: 270 tablet, Rfl: 1    hydroCHLOROthiazide (HYDRODIURIL) 25 MG tablet, Take 1 tablet by mouth Daily for 180 days., Disp: 90 tablet, Rfl: 1    losartan (Cozaar) 50 MG tablet, Take 2 tablets by mouth Daily., Disp: 180 tablet, Rfl: 1    omeprazole  (priLOSEC) 40 MG capsule, Take 1 capsule by mouth Daily., Disp: 90 capsule, Rfl: 1    ciprofloxacin (Cipro) 500 MG tablet, Take 1 tablet by mouth 2 (Two) Times a Day., Disp: 10 tablet, Rfl: 0    Patient Active Problem List   Diagnosis    Abnormal bone density screening    Pap smear for cervical cancer screening    Allergic rhinitis    Anemia    Arthritis    Asthma    Depression    Dyspeptic diarrhea    Esophageal reflux    History of pulmonary embolism    Hypertension    Venous insufficiency of left leg    Limb swelling    Moderate episode of recurrent major depressive disorder    Muscle cramp, nocturnal    Renal insufficiency    Shortness of breath    Osteoarthritis of both knees    Class 2 severe obesity due to excess calories with serious comorbidity and body mass index (BMI) of 35.0 to 35.9 in adult        Past Surgical History:   Procedure Laterality Date    APPENDECTOMY       SECTION      CHOLECYSTECTOMY      COLONOSCOPY  2014    normal    GALLBLADDER SURGERY      HYSTERECTOMY      OTHER SURGICAL HISTORY      mass removal on neck    PLANTAR FASCIA SURGERY Left     TONSILLECTOMY         Social History     Socioeconomic History    Marital status:    Tobacco Use    Smoking status: Never     Passive exposure: Never    Smokeless tobacco: Never   Vaping Use    Vaping status: Never Used   Substance and Sexual Activity    Alcohol use: Not Currently    Drug use: Never    Sexual activity: Defer       Family History   Problem Relation Age of Onset    Hypertension Mother     Cancer Mother     Arthritis Mother     Osteoporosis Mother     Hypertension Father     Cancer Father     Other Father         blood diseases    Hypertension Sister     Cancer Sister     Arthritis Sister        Family history, surgical history, past medical history, Allergies and med's reviewed with patient today and updated in [employer] EMR.     ROS:  Review of Systems   Gastrointestinal:  Positive for abdominal pain.    Genitourinary:  Negative for difficulty urinating, dysuria, frequency, urgency and vaginal discharge.        Vaginal itching    Musculoskeletal:  Positive for back pain.       OBJECTIVE:  Vitals:    09/06/24 0957   BP: 137/65   Pulse: 68   SpO2: 98%   Weight: 107 kg (235 lb 3.2 oz)     Body mass index is 49.16 kg/m².  No LMP recorded. Patient has had a hysterectomy.    Physical Exam  Cardiovascular:      Rate and Rhythm: Normal rate and regular rhythm.      Pulses: Normal pulses.      Heart sounds: Normal heart sounds.   Pulmonary:      Effort: Pulmonary effort is normal.      Breath sounds: Normal breath sounds.   Abdominal:      Tenderness: There is abdominal tenderness in the suprapubic area. There is no right CVA tenderness or left CVA tenderness.                Health Maintenance Due   Topic Date Due    DXA SCAN  Never done    ZOSTER VACCINE (1 of 2) Never done    MAMMOGRAM  05/03/2023    COVID-19 Vaccine (5 - 2023-24 season) 09/01/2024    INFLUENZA VACCINE  08/01/2024    COLORECTAL CANCER SCREENING  12/15/2024        Office Visit on 09/06/2024   Component Date Value Ref Range Status    Color 09/06/2024 Yellow  Yellow, Straw, Dark Yellow, Tabatha Final    Clarity, UA 09/06/2024 Slightly Cloudy (A)  Clear Final    Specific Gravity  09/06/2024 1.025  1.005 - 1.030 Final    pH, Urine 09/06/2024 5.5  5.0 - 8.0 Final    Leukocytes 09/06/2024 Small (1+) (A)  Negative Final    Nitrite, UA 09/06/2024 Negative  Negative Final    Protein, POC 09/06/2024 Trace (A)  Negative mg/dL Final    Glucose, UA 09/06/2024 Negative  Negative mg/dL Final    Ketones, UA 09/06/2024 Negative  Negative Final    Urobilinogen, UA 09/06/2024 0.2 E.U./dL  Normal, 0.2 E.U./dL Final    Bilirubin 09/06/2024 Small (1+) (A)  Negative Final    Blood, UA 09/06/2024 Trace (A)  Negative Final    Lot Number 09/06/2024 [id number]   Final    Expiration Date 09/06/2024 2/2,025   Final     Encouraged increased hydration and avoidance of carbonated beverages.   Expect urinary symptoms improved within 1 to 2 days.  Discussed that we will call patient with culture results when available and we will change antibiotics if needed at that time.  Patient to call us or return to clinic if fever, excessive nausea/vomiting, inability to hold down fluids, dehydration, significant back pain, or any other symptoms arise.  Take antibiotics as ordered.    ASSESSMENT/ PLAN:    Diagnoses and all orders for this visit:    1. Flank pain (Primary)  -     POCT urinalysis dipstick, automated    2. Acute cystitis with hematuria  -     ciprofloxacin (Cipro) 500 MG tablet; Take 1 tablet by mouth 2 (Two) Times a Day.  Dispense: 10 tablet; Refill: 0        Orders Placed Today:     New Medications Ordered This Visit   Medications    ciprofloxacin (Cipro) 500 MG tablet     Sig: Take 1 tablet by mouth 2 (Two) Times a Day.     Dispense:  10 tablet     Refill:  0        Management Plan:     An After Visit Summary was printed and given to the patient at discharge.    Follow-up: Return if symptoms worsen or fail to improve.    ARIN Hollis 9/6/2024 10:21 EDT  This note was electronically signed.

## 2024-09-07 DIAGNOSIS — I10 PRIMARY HYPERTENSION: ICD-10-CM

## 2024-09-07 LAB — BACTERIA SPEC AEROBE CULT: NORMAL

## 2024-09-09 ENCOUNTER — TELEPHONE (OUTPATIENT)
Dept: FAMILY MEDICINE CLINIC | Facility: CLINIC | Age: 69
End: 2024-09-09
Payer: COMMERCIAL

## 2024-09-09 DIAGNOSIS — N89.8 VAGINAL ITCHING: Primary | ICD-10-CM

## 2024-09-09 RX ORDER — LOSARTAN POTASSIUM 50 MG/1
100 TABLET ORAL DAILY
Qty: 180 TABLET | Refills: 1 | Status: SHIPPED | OUTPATIENT
Start: 2024-09-09

## 2024-09-09 RX ORDER — FLUCONAZOLE 150 MG/1
150 TABLET ORAL
Qty: 2 TABLET | Refills: 0 | Status: SHIPPED | OUTPATIENT
Start: 2024-09-09

## 2024-09-09 NOTE — TELEPHONE ENCOUNTER
Caller: Loan Nam    Relationship: Self    Best call back number:     [phone]       What medication are you requesting: TO TREAT SYMPTOMS    What are your current symptoms: YEAST INFECTION    How long have you been experiencing symptoms: 1 DAY    Have you had these symptoms before:    [x] Yes  [] No    Have you been treated for these symptoms before:   [x] Yes  [] No    If a prescription is needed, what is your preferred pharmacy and phone number: [facility] [id number]  [place] - [address] AT [facility] [address] - [phone] [facility] [phone] FX     Additional notes: HAS BEEN ON ANTIBIOTIC    PLEASE CALL AND ADVISE

## 2024-10-09 ENCOUNTER — OFFICE VISIT (OUTPATIENT)
Dept: FAMILY MEDICINE CLINIC | Facility: CLINIC | Age: 69
End: 2024-10-09
Payer: COMMERCIAL

## 2024-10-09 VITALS
HEART RATE: 75 BPM | DIASTOLIC BLOOD PRESSURE: 85 MMHG | TEMPERATURE: 97.5 F | WEIGHT: 233 LBS | HEIGHT: 58 IN | OXYGEN SATURATION: 98 % | BODY MASS INDEX: 48.91 KG/M2 | SYSTOLIC BLOOD PRESSURE: 178 MMHG

## 2024-10-09 DIAGNOSIS — R09.81 NASAL CONGESTION: Primary | ICD-10-CM

## 2024-10-09 DIAGNOSIS — J06.9 VIRAL URI: ICD-10-CM

## 2024-10-09 DIAGNOSIS — H69.93 ETD (EUSTACHIAN TUBE DYSFUNCTION), BILATERAL: ICD-10-CM

## 2024-10-09 LAB
EXPIRATION DATE: NORMAL
FLUAV AG UPPER RESP QL IA.RAPID: NOT DETECTED
FLUBV AG UPPER RESP QL IA.RAPID: NOT DETECTED
INTERNAL CONTROL: NORMAL
Lab: NORMAL
SARS-COV-2 AG UPPER RESP QL IA.RAPID: NOT DETECTED

## 2024-10-09 PROCEDURE — 87428 SARSCOV & INF VIR A&B AG IA: CPT

## 2024-10-09 PROCEDURE — 99213 OFFICE O/P EST LOW 20 MIN: CPT

## 2024-10-09 RX ORDER — PSEUDOEPHEDRINE HCL 120 MG/1
120 TABLET, FILM COATED, EXTENDED RELEASE ORAL DAILY
Qty: 5 TABLET | Refills: 0 | Status: SHIPPED | OUTPATIENT
Start: 2024-10-09

## 2024-10-09 RX ORDER — FEXOFENADINE HCL 180 MG/1
180 TABLET ORAL DAILY
Qty: 10 TABLET | Refills: 0 | Status: SHIPPED | OUTPATIENT
Start: 2024-10-09

## 2024-10-09 RX ORDER — FLUTICASONE PROPIONATE 50 UG/1
2 SPRAY, METERED NASAL DAILY
Qty: 15.8 ML | Refills: 0 | Status: SHIPPED | OUTPATIENT
Start: 2024-10-09

## 2024-10-09 NOTE — PROGRESS NOTES
"Chief Complaint  Cough, Nasal Congestion, and Headache    Linda Nam presents to [facility]  History of Present Illness  Pt has c/o cough, nasal congestion and H/A for the past 3 days. Pt states the mucus is clear in color. Pt has tried taking benadryl at night with little relief. Pt has not tried any other OTC medications for this. Pt would like to be tested for covid and flu.       Objective   Vital Signs:  /85   Pulse 75   Temp 97.5 °F (36.4 °C)   Ht 147.3 cm (58\")   Wt 106 kg (233 lb)   SpO2 98%   BMI 48.70 kg/m²   Estimated body mass index is 48.7 kg/m² as calculated from the following:    Height as of this encounter: 147.3 cm (58\").    Weight as of this encounter: 106 kg (233 lb).            Physical Exam  Vitals reviewed.   Constitutional:       General: She is not in acute distress.     Appearance: She is not ill-appearing.   HENT:      Head: Normocephalic and atraumatic.      Right Ear: A middle ear effusion is present.      Left Ear: A middle ear effusion is present.      Nose: Congestion present.      Mouth/Throat:      Mouth: Mucous membranes are moist.      Pharynx: Oropharynx is clear. No oropharyngeal exudate or posterior oropharyngeal erythema.   Eyes:      Conjunctiva/sclera: Conjunctivae normal.   Cardiovascular:      Rate and Rhythm: Normal rate and regular rhythm.      Heart sounds: Normal heart sounds.   Pulmonary:      Effort: Pulmonary effort is normal.      Breath sounds: Normal breath sounds.   Skin:     General: Skin is warm and dry.   Neurological:      Mental Status: She is alert and oriented to person, place, and time.   Psychiatric:         Mood and Affect: Mood normal.         Behavior: Behavior normal.         Thought Content: Thought content normal.         Judgment: Judgment normal.        Result Review :  The following data was reviewed by: ARIN Goldberg on 10/09/2024:  Common labs          12/28/2023    " 07:14   Common Labs   Glucose 96    BUN 17    Creatinine 1.01    Sodium 143    Potassium 3.6    Chloride 105    Calcium 8.3    Albumin 4.1    Total Bilirubin 0.5    Alkaline Phosphatase 141    AST (SGOT) 11    ALT (SGPT) 12    WBC 10.71    Hemoglobin 12.5    Hematocrit 36.7    Platelets 289    Total Cholesterol 144    Triglycerides 91    HDL Cholesterol 34    LDL Cholesterol  93                Assessment and Plan   Diagnoses and all orders for this visit:    1. Nasal congestion (Primary)  Comments:  start allegra and flonase, may take 1 sudafed a day if needed, monitor BP if elevated then DC  Orders:  -     POCT SARS-CoV-2 Antigen DANIELA + Flu  -     fluticasone (FLONASE) 50 MCG/ACT nasal spray; Administer 2 sprays into the nostril(s) as directed by provider Daily.  Dispense: 15.8 mL; Refill: 0  -     fexofenadine (Allegra Allergy) 180 MG tablet; Take 1 tablet by mouth Daily.  Dispense: 10 tablet; Refill: 0  -     pseudoephedrine (SUDAFED) 120 MG 12 hr tablet; Take 1 tablet by mouth Daily.  Dispense: 5 tablet; Refill: 0    2. Viral URI  Comments:  viral,if no better or worsens in 7-10 days call for ABX.  Orders:  -     POCT SARS-CoV-2 Antigen DANIELA + Flu  -     fluticasone (FLONASE) 50 MCG/ACT nasal spray; Administer 2 sprays into the nostril(s) as directed by provider Daily.  Dispense: 15.8 mL; Refill: 0  -     fexofenadine (Allegra Allergy) 180 MG tablet; Take 1 tablet by mouth Daily.  Dispense: 10 tablet; Refill: 0  -     pseudoephedrine (SUDAFED) 120 MG 12 hr tablet; Take 1 tablet by mouth Daily.  Dispense: 5 tablet; Refill: 0    3. ETD (Eustachian tube dysfunction), bilateral  -     POCT SARS-CoV-2 Antigen DANIELA + Flu  -     fluticasone (FLONASE) 50 MCG/ACT nasal spray; Administer 2 sprays into the nostril(s) as directed by provider Daily.  Dispense: 15.8 mL; Refill: 0  -     fexofenadine (Allegra Allergy) 180 MG tablet; Take 1 tablet by mouth Daily.  Dispense: 10 tablet; Refill: 0  -     pseudoephedrine (SUDAFED) 120  MG 12 hr tablet; Take 1 tablet by mouth Daily.  Dispense: 5 tablet; Refill: 0             Follow Up   Return in about 1 month (around 11/9/2024) for Next scheduled follow up, Annual physical.  Patient was given instructions and counseling regarding her condition or for health maintenance advice. Please see specific information pulled into the AVS if appropriate.              Dr. Reyes

## 2024-10-14 ENCOUNTER — TELEPHONE (OUTPATIENT)
Dept: FAMILY MEDICINE CLINIC | Facility: CLINIC | Age: 69
End: 2024-10-14
Payer: COMMERCIAL

## 2024-10-14 DIAGNOSIS — J01.40 ACUTE NON-RECURRENT PANSINUSITIS: Primary | ICD-10-CM

## 2024-10-14 NOTE — TELEPHONE ENCOUNTER
Caller: Patient       Best call back [phone]      Requested Prescriptions:  Pharmacy where request should be sent: [facility] in [facility]  Additional details provided by patient:Patient stated that Maria Alejandra told her if she wasn't feeling any better than to call the office and she would prescribe another prescription in for her.       Would you like a call back once the refill request has been completed: [ X] Yes [ ] No      If the office needs to give you a call back, can they leave a voicemail: [X ] Yes [ ] N

## 2024-10-15 RX ORDER — AZITHROMYCIN 250 MG/1
TABLET, FILM COATED ORAL
Qty: 6 TABLET | Refills: 0 | Status: SHIPPED | OUTPATIENT
Start: 2024-10-15

## 2024-10-28 ENCOUNTER — OFFICE VISIT (OUTPATIENT)
Dept: FAMILY MEDICINE CLINIC | Facility: CLINIC | Age: 69
End: 2024-10-28
Payer: COMMERCIAL

## 2024-10-28 VITALS
BODY MASS INDEX: 49.08 KG/M2 | HEART RATE: 67 BPM | HEIGHT: 58 IN | SYSTOLIC BLOOD PRESSURE: 171 MMHG | WEIGHT: 233.8 LBS | DIASTOLIC BLOOD PRESSURE: 70 MMHG | OXYGEN SATURATION: 98 %

## 2024-10-28 DIAGNOSIS — M54.32 SCIATICA OF LEFT SIDE: ICD-10-CM

## 2024-10-28 DIAGNOSIS — R05.1 ACUTE COUGH: ICD-10-CM

## 2024-10-28 DIAGNOSIS — J01.40 ACUTE NON-RECURRENT PANSINUSITIS: Primary | ICD-10-CM

## 2024-10-28 PROCEDURE — 99213 OFFICE O/P EST LOW 20 MIN: CPT

## 2024-10-28 PROCEDURE — 96372 THER/PROPH/DIAG INJ SC/IM: CPT

## 2024-10-28 RX ORDER — DOXYCYCLINE 100 MG/1
100 CAPSULE ORAL 2 TIMES DAILY
Qty: 14 CAPSULE | Refills: 0 | Status: SHIPPED | OUTPATIENT
Start: 2024-10-28 | End: 2024-11-04

## 2024-10-28 RX ORDER — METHOCARBAMOL 500 MG/1
500 TABLET, FILM COATED ORAL 3 TIMES DAILY PRN
Qty: 90 TABLET | Refills: 1 | Status: SHIPPED | OUTPATIENT
Start: 2024-10-28

## 2024-10-28 RX ORDER — TRIAMCINOLONE ACETONIDE 40 MG/ML
60 INJECTION, SUSPENSION INTRA-ARTICULAR; INTRAMUSCULAR ONCE
Status: COMPLETED | OUTPATIENT
Start: 2024-10-28 | End: 2024-10-28

## 2024-10-28 RX ADMIN — TRIAMCINOLONE ACETONIDE 60 MG: 40 INJECTION, SUSPENSION INTRA-ARTICULAR; INTRAMUSCULAR at 09:48

## 2024-10-28 NOTE — PROGRESS NOTES
Chief Complaint   Patient presents with    Sciatica    Sinusitis        Linda Nam  has a past medical history of Acid reflux, Allergic rhinitis, Anemia, Arthritis, Asthma, Bile salt-induced diarrhea, Depression, H/O bone density study, History of pulmonary embolism, Hypertension, Limb swelling, Moderate episode of recurrent major depressive disorder (02/03/2020), Muscle cramp, nocturnal (07/17/2018), Pap smear for cervical cancer screening (2000), Renal insufficiency (04/12/2021), Seasonal allergies, SOB (shortness of breath), Venous insufficiency of left leg (07/17/2018), and Visit for screening mammogram (10/2019).    AMADEO Cates is a 69-year-old female patient of my colleague Cyndie Bess.  Today I am seeing her for an acute visit.    Presents today with headache, nasal congestion, and lower back pain that radiates down her leg.  She was previously seen on 10/9/2024 for cough, nasal congestion, and headache she was started on Flonase, Allegra, and Sudafed.  Followed up on 10/14/2024 stating that she was not feeling better so a Z-Andrew was sent in at that time. Yellow color drainage, excessive amount, bad taste, drainage going into her chest, and slight cough now.  Will discontinue Z-Andrew and send in doxycycline due to penicillin allergy.    Complaints of sciatica down the left side.  Start middle of the back and radiates down left leg.  Suffered with sciatic nerve pain in December of last year on the right side where she got a Kenalog injection and was given muscle relaxers with relief.  Today we will give her 60 mg of Kenalog and give some Robaxin as needed.  If no improvement recommend physical therapy.    Allergies   Allergen Reactions    Contrast Dye (Echo Or Unknown Ct/Mr) Hives    Iodinated Contrast Media Hives    Norvasc [Amlodipine] Shortness Of Breath    Penicillins Swelling    Sulfa Antibiotics Rash         Current Outpatient Medications:     albuterol sulfate  (90 Base)  MCG/ACT inhaler, Inhale 2 puffs Every 4 (Four) Hours As Needed for Wheezing., Disp: 18 g, Rfl: 2    busPIRone (BUSPAR) 15 MG tablet, Take 1 tablet by mouth 2 (Two) Times a Day for 180 days., Disp: 180 tablet, Rfl: 1    carvedilol (COREG) 3.125 MG tablet, Take 2 tabs PO in the am and one tab pm, Disp: 270 tablet, Rfl: 1    fexofenadine (Allegra Allergy) 180 MG tablet, Take 1 tablet by mouth Daily., Disp: 10 tablet, Rfl: 0    fluticasone (FLONASE) 50 MCG/ACT nasal spray, Administer 2 sprays into the nostril(s) as directed by provider Daily., Disp: 15.8 mL, Rfl: 0    hydroCHLOROthiazide (HYDRODIURIL) 25 MG tablet, Take 1 tablet by mouth Daily for 180 days., Disp: 90 tablet, Rfl: 1    losartan (COZAAR) 50 MG tablet, TAKE 2 TABLETS BY MOUTH DAILY, Disp: 180 tablet, Rfl: 1    omeprazole (priLOSEC) 40 MG capsule, Take 1 capsule by mouth Daily., Disp: 90 capsule, Rfl: 1    azithromycin (Zithromax Z-Andrew) 250 MG tablet, Take 2 tablets by mouth on day 1, then 1 tablet daily on days 2-5 (Patient not taking: Reported on 10/28/2024), Disp: 6 tablet, Rfl: 0    doxycycline (VIBRAMYCIN) 100 MG capsule, Take 1 capsule by mouth 2 (Two) Times a Day for 7 days., Disp: 14 capsule, Rfl: 0    methocarbamol (ROBAXIN) 500 MG tablet, Take 1 tablet by mouth 3 (Three) Times a Day As Needed for Muscle Spasms., Disp: 90 tablet, Rfl: 1    Current Facility-Administered Medications:     triamcinolone acetonide (KENALOG-40) injection 60 mg, 60 mg, Intramuscular, Once, Haley Jimenez APRN    Patient Active Problem List   Diagnosis    Abnormal bone density screening    Pap smear for cervical cancer screening    Allergic rhinitis    Anemia    Arthritis    Asthma    Depression    Dyspeptic diarrhea    Esophageal reflux    History of pulmonary embolism    Hypertension    Venous insufficiency of left leg    Limb swelling    Moderate episode of recurrent major depressive disorder    Muscle cramp, nocturnal    Renal insufficiency    Shortness of breath     "Osteoarthritis of both knees    Class 2 severe obesity due to excess calories with serious comorbidity and body mass index (BMI) of 35.0 to 35.9 in adult        Past Surgical History:   Procedure Laterality Date    APPENDECTOMY       SECTION      CHOLECYSTECTOMY      COLONOSCOPY  2014    normal    GALLBLADDER SURGERY      HYSTERECTOMY      OTHER SURGICAL HISTORY      mass removal on neck    PLANTAR FASCIA SURGERY Left     TONSILLECTOMY         Social History     Socioeconomic History    Marital status:    Tobacco Use    Smoking status: Never     Passive exposure: Never    Smokeless tobacco: Never   Vaping Use    Vaping status: Never Used   Substance and Sexual Activity    Alcohol use: Not Currently    Drug use: Never    Sexual activity: Defer       Family History   Problem Relation Age of Onset    Hypertension Mother     Cancer Mother     Arthritis Mother     Osteoporosis Mother     Hypertension Father     Cancer Father     Other Father         blood diseases    Hypertension Sister     Cancer Sister     Arthritis Sister        Family history, surgical history, past medical history, Allergies and med's reviewed with patient today and updated in [employer] EMR.     ROS:  Review of Systems   HENT:  Positive for congestion and postnasal drip.    Respiratory:  Positive for cough.    Cardiovascular: Negative.    Gastrointestinal: Negative.    Endocrine: Negative.    Genitourinary: Negative.    Musculoskeletal:  Positive for back pain.   Skin: Negative.    Neurological:  Positive for headache.   Hematological: Negative.    Psychiatric/Behavioral: Negative.         OBJECTIVE:  Vitals:    10/28/24 0914   BP: 171/70   Pulse: 67   SpO2: 98%   Weight: 106 kg (233 lb 12.8 oz)   Height: 147.3 cm (58\")     Body mass index is 48.86 kg/m².  No LMP recorded. Patient has had a hysterectomy.    Physical Exam  HENT:      Nose:      Right Sinus: Maxillary sinus tenderness and frontal sinus tenderness present. "      Left Sinus: Maxillary sinus tenderness and frontal sinus tenderness present.   Cardiovascular:      Rate and Rhythm: Normal rate and regular rhythm.      Pulses: Normal pulses.      Heart sounds: Normal heart sounds.   Pulmonary:      Effort: Pulmonary effort is normal.      Breath sounds: Normal breath sounds.                Health Maintenance Due   Topic Date Due    DXA SCAN  Never done    MAMMOGRAM  05/03/2023    COLORECTAL CANCER SCREENING  12/15/2024        Office Visit on 10/09/2024   Component Date Value Ref Range Status    SARS Antigen 10/09/2024 Not Detected  Not Detected, Presumptive Negative Final    Influenza A Antigen DANIELA 10/09/2024 Not Detected  Not Detected Final    Influenza B Antigen DANIELA 10/09/2024 Not Detected  Not Detected Final    Internal Control 10/09/2024 Passed  Passed Final    Lot Number 10/09/2024 [id number]   Final    Expiration Date 10/09/2024 10/23/25   Final       ASSESSMENT/ PLAN:    Diagnoses and all orders for this visit:    1. Acute non-recurrent pansinusitis (Primary)  -     doxycycline (VIBRAMYCIN) 100 MG capsule; Take 1 capsule by mouth 2 (Two) Times a Day for 7 days.  Dispense: 14 capsule; Refill: 0  -     triamcinolone acetonide (KENALOG-40) injection 60 mg    2. Acute cough  -     triamcinolone acetonide (KENALOG-40) injection 60 mg    3. Sciatica of left side  -     methocarbamol (ROBAXIN) 500 MG tablet; Take 1 tablet by mouth 3 (Three) Times a Day As Needed for Muscle Spasms.  Dispense: 90 tablet; Refill: 1        Orders Placed Today:     New Medications Ordered This Visit   Medications    doxycycline (VIBRAMYCIN) 100 MG capsule     Sig: Take 1 capsule by mouth 2 (Two) Times a Day for 7 days.     Dispense:  14 capsule     Refill:  0    methocarbamol (ROBAXIN) 500 MG tablet     Sig: Take 1 tablet by mouth 3 (Three) Times a Day As Needed for Muscle Spasms.     Dispense:  90 tablet     Refill:  1    triamcinolone acetonide (KENALOG-40) injection 60 mg        Management  Plan:     An After Visit Summary was printed and given to the patient at discharge.    Follow-up: Return if symptoms worsen or fail to improve.    ARIN Hollis 10/28/2024 09:46 EDT  This note was electronically signed.

## 2024-10-28 NOTE — LETTER
October 28, 2024     Patient: Loan Nam   YOB: 1955   Date of Visit: 10/28/2024       To Whom It May Concern:    It is my medical opinion that Loan Nam may return to work on 11/4/24 or sooner if her pain is relieved. She was seen in my office on 10/28/24.          Sincerely,        ARIN Hollis    CC: No Recipients

## 2024-11-22 ENCOUNTER — LAB (OUTPATIENT)
Dept: LAB | Facility: HOSPITAL | Age: 69
End: 2024-11-22
Payer: COMMERCIAL

## 2024-11-22 ENCOUNTER — OFFICE VISIT (OUTPATIENT)
Dept: FAMILY MEDICINE CLINIC | Facility: CLINIC | Age: 69
End: 2024-11-22
Payer: COMMERCIAL

## 2024-11-22 VITALS
HEART RATE: 90 BPM | BODY MASS INDEX: 48.66 KG/M2 | HEIGHT: 58 IN | SYSTOLIC BLOOD PRESSURE: 175 MMHG | DIASTOLIC BLOOD PRESSURE: 89 MMHG | WEIGHT: 231.8 LBS | OXYGEN SATURATION: 100 %

## 2024-11-22 DIAGNOSIS — R25.2 MUSCLE CRAMPS: ICD-10-CM

## 2024-11-22 DIAGNOSIS — Z12.31 ENCOUNTER FOR SCREENING MAMMOGRAM FOR MALIGNANT NEOPLASM OF BREAST: ICD-10-CM

## 2024-11-22 DIAGNOSIS — Z23 NEED FOR INFLUENZA VACCINATION: Primary | ICD-10-CM

## 2024-11-22 LAB
25(OH)D3 SERPL-MCNC: 7.2 NG/ML (ref 30–100)
ALBUMIN SERPL-MCNC: 4.1 G/DL (ref 3.5–5.2)
ALBUMIN/GLOB SERPL: 1.2 G/DL
ALP SERPL-CCNC: 141 U/L (ref 39–117)
ALT SERPL W P-5'-P-CCNC: 13 U/L (ref 1–33)
ANION GAP SERPL CALCULATED.3IONS-SCNC: 16.3 MMOL/L (ref 5–15)
AST SERPL-CCNC: 18 U/L (ref 1–32)
BASOPHILS # BLD AUTO: 0.05 10*3/MM3 (ref 0–0.2)
BASOPHILS NFR BLD AUTO: 0.5 % (ref 0–1.5)
BILIRUB SERPL-MCNC: 0.4 MG/DL (ref 0–1.2)
BUN SERPL-MCNC: 27 MG/DL (ref 8–23)
BUN/CREAT SERPL: 16.6 (ref 7–25)
CA-I SERPL ISE-MCNC: 1.09 MMOL/L (ref 1.15–1.35)
CALCIUM SPEC-SCNC: 8.2 MG/DL (ref 8.6–10.5)
CHLORIDE SERPL-SCNC: 103 MMOL/L (ref 98–107)
CHOLEST SERPL-MCNC: 144 MG/DL (ref 0–200)
CO2 SERPL-SCNC: 20.7 MMOL/L (ref 22–29)
CREAT SERPL-MCNC: 1.63 MG/DL (ref 0.57–1)
DEPRECATED RDW RBC AUTO: 45 FL (ref 37–54)
EGFRCR SERPLBLD CKD-EPI 2021: 34.4 ML/MIN/1.73
EOSINOPHIL # BLD AUTO: 0.12 10*3/MM3 (ref 0–0.4)
EOSINOPHIL NFR BLD AUTO: 1.2 % (ref 0.3–6.2)
ERYTHROCYTE [DISTWIDTH] IN BLOOD BY AUTOMATED COUNT: 13.8 % (ref 12.3–15.4)
GLOBULIN UR ELPH-MCNC: 3.5 GM/DL
GLUCOSE SERPL-MCNC: 79 MG/DL (ref 65–99)
HCT VFR BLD AUTO: 38.3 % (ref 34–46.6)
HDLC SERPL-MCNC: 34 MG/DL (ref 40–60)
HGB BLD-MCNC: 12.7 G/DL (ref 12–15.9)
IMM GRANULOCYTES # BLD AUTO: 0.05 10*3/MM3 (ref 0–0.05)
IMM GRANULOCYTES NFR BLD AUTO: 0.5 % (ref 0–0.5)
LDLC SERPL CALC-MCNC: 88 MG/DL (ref 0–100)
LDLC/HDLC SERPL: 2.54 {RATIO}
LYMPHOCYTES # BLD AUTO: 1.8 10*3/MM3 (ref 0.7–3.1)
LYMPHOCYTES NFR BLD AUTO: 18 % (ref 19.6–45.3)
MAGNESIUM SERPL-MCNC: 1.1 MG/DL (ref 1.6–2.4)
MCH RBC QN AUTO: 29.9 PG (ref 26.6–33)
MCHC RBC AUTO-ENTMCNC: 33.2 G/DL (ref 31.5–35.7)
MCV RBC AUTO: 90.1 FL (ref 79–97)
MONOCYTES # BLD AUTO: 0.66 10*3/MM3 (ref 0.1–0.9)
MONOCYTES NFR BLD AUTO: 6.6 % (ref 5–12)
NEUTROPHILS NFR BLD AUTO: 7.32 10*3/MM3 (ref 1.7–7)
NEUTROPHILS NFR BLD AUTO: 73.2 % (ref 42.7–76)
NRBC BLD AUTO-RTO: 0 /100 WBC (ref 0–0.2)
PLATELET # BLD AUTO: 328 10*3/MM3 (ref 140–450)
PMV BLD AUTO: 11 FL (ref 6–12)
POTASSIUM SERPL-SCNC: 3.7 MMOL/L (ref 3.5–5.2)
PROT SERPL-MCNC: 7.6 G/DL (ref 6–8.5)
RBC # BLD AUTO: 4.25 10*6/MM3 (ref 3.77–5.28)
SODIUM SERPL-SCNC: 140 MMOL/L (ref 136–145)
TRIGL SERPL-MCNC: 119 MG/DL (ref 0–150)
TSH SERPL DL<=0.05 MIU/L-ACNC: 2.2 UIU/ML (ref 0.27–4.2)
VIT B12 BLD-MCNC: 366 PG/ML (ref 211–946)
VLDLC SERPL-MCNC: 22 MG/DL (ref 5–40)
WBC NRBC COR # BLD AUTO: 10 10*3/MM3 (ref 3.4–10.8)

## 2024-11-22 PROCEDURE — 80050 GENERAL HEALTH PANEL: CPT

## 2024-11-22 PROCEDURE — 84207 ASSAY OF VITAMIN B-6: CPT

## 2024-11-22 PROCEDURE — 36415 COLL VENOUS BLD VENIPUNCTURE: CPT

## 2024-11-22 PROCEDURE — 82607 VITAMIN B-12: CPT

## 2024-11-22 PROCEDURE — 82330 ASSAY OF CALCIUM: CPT

## 2024-11-22 PROCEDURE — 80061 LIPID PANEL: CPT

## 2024-11-22 PROCEDURE — 83735 ASSAY OF MAGNESIUM: CPT

## 2024-11-22 PROCEDURE — 82306 VITAMIN D 25 HYDROXY: CPT

## 2024-11-22 PROCEDURE — 84591 ASSAY OF NOS VITAMIN: CPT

## 2024-11-22 NOTE — PROGRESS NOTES
Chief Complaint   Patient presents with    Leg Pain     Pt c/o of both leg pain for about 2 days.     Hand Pain               Linda Nam  has a past medical history of Acid reflux, Allergic rhinitis, Anemia, Arthritis, Asthma, Bile salt-induced diarrhea, Depression, H/O bone density study, History of pulmonary embolism, Hypertension, Limb swelling, Moderate episode of recurrent major depressive disorder (02/03/2020), Muscle cramp, nocturnal (07/17/2018), Pap smear for cervical cancer screening (2000), Renal insufficiency (04/12/2021), Seasonal allergies, SOB (shortness of breath), Venous insufficiency of left leg (07/17/2018), and Visit for screening mammogram (10/2019).    HPI    History of Present Illness  The patient is a 69-year-old female who presents today for cramps in both legs and deformities in bilateral fingers.    She reports experiencing leg cramps, a symptom she has had previously. She suspects her leg cramps may be due to overexertion from cooking Thanksgiving meals. She also mentions a history of low potassium levels. She believes she may have a vitamin D deficiency, as she was prescribed vitamin D but did not take it.    Her diet today included a roast beef sandwich and a Coke. She took her blood pressure medication just before this visit, although she usually takes it at 5:00 am.    She has arthritis but is not currently on any medication for it. She expresses a fear of narcotics and potential side effects from arthritis medication. She uses Tylenol Arthritis for pain relief, which she finds effective. 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 She is seeking advice on managing her hand pain during the day, as it interferes with her work. She is cautious about using creams while handling food.    She had to cancel a mammogram appointment due to illness but plans to reschedule. She is not interested in a DEXA scan. She requests an  influenza vaccine during this visit.    She enjoys her work and does not wish to stop. She has difficulty walking fast due to knee issues but is quick with her hands. She plans to continue using her hands even after [facility]PC, with activities such as crocheting and making blankets.    BP elevated in office today, took her BP medication right before her appointment when she usually takes it at 5am. Instructed to keep an eye on BP at home and report back if it persistently stays high for medication adjustment.     FAMILY HISTORY  Her sister had brain surgery and takes muscle relaxers and hydrocodone every day.      Allergies   Allergen Reactions    Contrast Dye (Echo Or Unknown Ct/Mr) Hives    Iodinated Contrast Media Hives    Norvasc [Amlodipine] Shortness Of Breath    Penicillins Swelling    Sulfa Antibiotics Rash         Current Outpatient Medications:     albuterol sulfate  (90 Base) MCG/ACT inhaler, Inhale 2 puffs Every 4 (Four) Hours As Needed for Wheezing., Disp: 18 g, Rfl: 2    busPIRone (BUSPAR) 15 MG tablet, Take 1 tablet by mouth 2 (Two) Times a Day for 180 days., Disp: 180 tablet, Rfl: 1    carvedilol (COREG) 3.125 MG tablet, Take 2 tabs PO in the am and one tab pm, Disp: 270 tablet, Rfl: 1    fexofenadine (Allegra Allergy) 180 MG tablet, Take 1 tablet by mouth Daily., Disp: 10 tablet, Rfl: 0    fluticasone (FLONASE) 50 MCG/ACT nasal spray, Administer 2 sprays into the nostril(s) as directed by provider Daily., Disp: 15.8 mL, Rfl: 0    hydroCHLOROthiazide (HYDRODIURIL) 25 MG tablet, Take 1 tablet by mouth Daily for 180 days., Disp: 90 tablet, Rfl: 1    losartan (COZAAR) 50 MG tablet, TAKE 2 TABLETS BY MOUTH DAILY, Disp: 180 tablet, Rfl: 1    methocarbamol (ROBAXIN) 500 MG tablet, Take 1 tablet by mouth 3 (Three) Times a Day As Needed for Muscle Spasms., Disp: 90 tablet, Rfl: 1    omeprazole (priLOSEC) 40 MG capsule, Take 1 capsule by mouth Daily., Disp: 90 capsule, Rfl: 1    Patient Active Problem  "List   Diagnosis    Abnormal bone density screening    Pap smear for cervical cancer screening    Allergic rhinitis    Anemia    Arthritis    Asthma    Depression    Dyspeptic diarrhea    Esophageal reflux    History of pulmonary embolism    Hypertension    Venous insufficiency of left leg    Limb swelling    Moderate episode of recurrent major depressive disorder    Muscle cramp, nocturnal    Renal insufficiency    Shortness of breath    Osteoarthritis of both knees    Class 2 severe obesity due to excess calories with serious comorbidity and body mass index (BMI) of 35.0 to 35.9 in adult        Past Surgical History:   Procedure Laterality Date    APPENDECTOMY       SECTION      CHOLECYSTECTOMY      COLONOSCOPY  2014    normal    GALLBLADDER SURGERY      HYSTERECTOMY      OTHER SURGICAL HISTORY      mass removal on neck    PLANTAR FASCIA SURGERY Left     TONSILLECTOMY         Social History     Socioeconomic History    Marital status:    Tobacco Use    Smoking status: Never     Passive exposure: Never    Smokeless tobacco: Never   Vaping Use    Vaping status: Never Used   Substance and Sexual Activity    Alcohol use: Not Currently    Drug use: Never    Sexual activity: Defer       Family History   Problem Relation Age of Onset    Hypertension Mother     Cancer Mother     Arthritis Mother     Osteoporosis Mother     Hypertension Father     Cancer Father     Other Father         blood diseases    Hypertension Sister     Cancer Sister     Arthritis Sister        Family history, surgical history, past medical history, Allergies and med's reviewed with patient today and updated in [employer] EMR.     ROS:  Review of Systems   Respiratory: Negative.     Cardiovascular: Negative.    Musculoskeletal:  Positive for myalgias.       OBJECTIVE:  Vitals:    24 0846   BP: 175/89   Pulse: 90   SpO2: 100%   Weight: 105 kg (231 lb 12.8 oz)   Height: 147.3 cm (58\")     Body mass index is 48.45 " kg/m².  No LMP recorded. Patient has had a hysterectomy.    Physical Exam  Cardiovascular:      Rate and Rhythm: Normal rate and regular rhythm.      Pulses: Normal pulses.      Heart sounds: Normal heart sounds.   Pulmonary:      Effort: Pulmonary effort is normal.      Breath sounds: Normal breath sounds.         Physical Exam  Clear lung sounds.  Normal heart sounds.    Procedures            Health Maintenance Due   Topic Date Due    MAMMOGRAM  05/03/2023    ANNUAL PHYSICAL  11/01/2024    COLORECTAL CANCER SCREENING  12/15/2024        No visits with results within 30 Day(s) from this visit.   Latest known visit with results is:   Office Visit on 10/09/2024   Component Date Value Ref Range Status    SARS Antigen 10/09/2024 Not Detected  Not Detected, Presumptive Negative Final    Influenza A Antigen DANIELA 10/09/2024 Not Detected  Not Detected Final    Influenza B Antigen DANIELA 10/09/2024 Not Detected  Not Detected Final    Internal Control 10/09/2024 Passed  Passed Final    Lot Number 10/09/2024 [id number]   Final    Expiration Date 10/09/2024 10/23/25   Final       Results        ASSESSMENT/ PLAN:    Diagnoses and all orders for this visit:    1. Need for influenza vaccination (Primary)  -     Fluzone High-Dose 65+yrs ([id number])    2. Muscle cramps  -     Magnesium; Future  -     Calcium, Ionized; Future  -     Vitamin D 25 hydroxy; Future  -     Vitamin B12; Future  -     Vitamin B1, Whole Blood; Future  -     Vitamin B7 (Biotin); Future  -     Vitamin B6; Future  -     Comprehensive metabolic panel; Future  -     TSH Rfx On Abnormal To Free T4  -     Lipid panel; Future  -     CBC w AUTO Differential; Future    3. Encounter for screening mammogram for malignant neoplasm of breast  -     Mammo Screening Digital Tomosynthesis Bilateral With CAD; Future        Assessment & Plan  1. Bilateral leg cramps.  The muscle cramps are likely due to an electrolyte imbalance. Comprehensive blood work will be conducted, including  tests for magnesium, calcium, potassium, and vitamin D levels. If any deficiencies are identified in the blood work, appropriate treatment will be initiated.    2. Finger deformities.  The patient reports deformities in bilateral fingers and associated pain, likely due to arthritis. She currently manages the pain with Tylenol Arthritis, which she finds effective. She is advised to continue using Tylenol Arthritis as needed. If the pain persists or worsens, further evaluation and treatment options will be considered.    3. Elevated blood pressure.  Her blood pressure is slightly elevated today, possibly due to taking her medication later than usual. She is advised to monitor her blood pressure regularly and ensure timely medication intake.    4. Health Maintenance.  An influenza vaccine will be administered today. She will reschedule her mammogram appointment, which was previously canceled due to illness.      Orders Placed Today:     No orders of the defined types were placed in this encounter.       Management Plan:     An After Visit Summary was printed and given to the patient at discharge.    Follow-up: Return if symptoms worsen or fail to improve.    Patient or patient representative verbalized consent for the use of Ambient Listening during the visit with  ARIN Hollis for chart documentation. 11/22/2024  09:47 EST    ARIN Hollis 11/22/2024 09:47 EST  This note was electronically signed.

## 2024-11-25 ENCOUNTER — TELEPHONE (OUTPATIENT)
Dept: FAMILY MEDICINE CLINIC | Facility: CLINIC | Age: 69
End: 2024-11-25

## 2024-11-27 DIAGNOSIS — E55.9 VITAMIN D DEFICIENCY: ICD-10-CM

## 2024-11-27 DIAGNOSIS — E83.42 HYPOMAGNESEMIA: ICD-10-CM

## 2024-11-27 DIAGNOSIS — E83.51 HYPOCALCEMIA: Primary | ICD-10-CM

## 2024-11-27 DIAGNOSIS — E53.8 VITAMIN B 12 DEFICIENCY: ICD-10-CM

## 2024-11-27 RX ORDER — CALCIUM CARBONATE 500(1250)
500 TABLET ORAL DAILY
Qty: 30 TABLET | Refills: 1 | Status: SHIPPED | OUTPATIENT
Start: 2024-11-27

## 2024-11-27 RX ORDER — MAGNESIUM OXIDE 400 MG/1
400 TABLET ORAL DAILY
Qty: 30 TABLET | Refills: 1 | Status: SHIPPED | OUTPATIENT
Start: 2024-11-27

## 2024-11-27 RX ORDER — MAGNESIUM 200 MG
1000 TABLET ORAL DAILY
Qty: 30 EACH | Refills: 3 | Status: SHIPPED | OUTPATIENT
Start: 2024-11-27

## 2024-11-27 RX ORDER — ERGOCALCIFEROL 1.25 MG/1
50000 CAPSULE, LIQUID FILLED ORAL WEEKLY
Qty: 6 CAPSULE | Refills: 0 | Status: SHIPPED | OUTPATIENT
Start: 2024-11-27

## 2024-11-28 LAB — PYRIDOXAL PHOS SERPL-MCNC: 3 UG/L (ref 3.4–65.2)

## 2024-12-01 LAB — BIOTIN SERPL-MCNC: <0.05 NG/ML (ref 0.05–0.83)

## 2024-12-11 DIAGNOSIS — I10 PRIMARY HYPERTENSION: ICD-10-CM

## 2024-12-11 RX ORDER — HYDROCHLOROTHIAZIDE 25 MG/1
25 TABLET ORAL DAILY
Qty: 90 TABLET | Refills: 1 | Status: SHIPPED | OUTPATIENT
Start: 2024-12-11 | End: 2025-06-09

## 2024-12-11 NOTE — TELEPHONE ENCOUNTER
Caller: Loan Nam    Relationship: Self    Best call back number: [phone]    Requested Prescriptions:   Requested Prescriptions     Pending Prescriptions Disp Refills    hydroCHLOROthiazide 25 MG tablet 90 tablet 1     Sig: Take 1 tablet by mouth Daily for 180 days.        Pharmacy where request should be sent: [facility] [id number] [place], [place] - [address] [facility] [address] [phone] [phone] FX     Last office visit with prescribing clinician: 11/22/2024   Last telemedicine visit with prescribing clinician: Visit date not found   Next office visit with prescribing clinician: 2/26/2025     Additional details provided by patient: PATIENT GETS 90 DAY SUPPLY    Does the patient have less than a 3 day supply:  [x] Yes  [] No        Devora Harvey Rep   12/11/24 10:13 EST

## 2024-12-12 ENCOUNTER — OFFICE VISIT (OUTPATIENT)
Dept: FAMILY MEDICINE CLINIC | Facility: CLINIC | Age: 69
End: 2024-12-12
Payer: COMMERCIAL

## 2024-12-12 VITALS
BODY MASS INDEX: 48.78 KG/M2 | HEART RATE: 87 BPM | OXYGEN SATURATION: 98 % | DIASTOLIC BLOOD PRESSURE: 83 MMHG | HEIGHT: 58 IN | TEMPERATURE: 98.4 F | WEIGHT: 232.4 LBS | SYSTOLIC BLOOD PRESSURE: 149 MMHG

## 2024-12-12 DIAGNOSIS — U07.1 COVID-19: Primary | ICD-10-CM

## 2024-12-12 DIAGNOSIS — J02.9 SORE THROAT: ICD-10-CM

## 2024-12-12 DIAGNOSIS — R05.9 COUGH IN ADULT: ICD-10-CM

## 2024-12-12 LAB
EXPIRATION DATE: ABNORMAL
EXPIRATION DATE: NORMAL
FLUAV AG UPPER RESP QL IA.RAPID: NOT DETECTED
FLUBV AG UPPER RESP QL IA.RAPID: NOT DETECTED
INTERNAL CONTROL: ABNORMAL
INTERNAL CONTROL: NORMAL
Lab: ABNORMAL
Lab: NORMAL
S PYO AG THROAT QL: NEGATIVE
SARS-COV-2 AG UPPER RESP QL IA.RAPID: DETECTED

## 2024-12-12 PROCEDURE — 96372 THER/PROPH/DIAG INJ SC/IM: CPT

## 2024-12-12 PROCEDURE — 87880 STREP A ASSAY W/OPTIC: CPT

## 2024-12-12 PROCEDURE — 87428 SARSCOV & INF VIR A&B AG IA: CPT

## 2024-12-12 PROCEDURE — 99213 OFFICE O/P EST LOW 20 MIN: CPT

## 2024-12-12 RX ORDER — METHYLPREDNISOLONE 4 MG/1
TABLET ORAL
Qty: 21 TABLET | Refills: 0 | Status: SHIPPED | OUTPATIENT
Start: 2024-12-12

## 2024-12-12 RX ORDER — ONDANSETRON 4 MG/1
4 TABLET, FILM COATED ORAL EVERY 8 HOURS PRN
Qty: 28 TABLET | Refills: 0 | Status: SHIPPED | OUTPATIENT
Start: 2024-12-12

## 2024-12-12 RX ORDER — KETOROLAC TROMETHAMINE 30 MG/ML
30 INJECTION, SOLUTION INTRAMUSCULAR; INTRAVENOUS ONCE
Status: COMPLETED | OUTPATIENT
Start: 2024-12-12 | End: 2024-12-12

## 2024-12-12 RX ORDER — BENZONATATE 200 MG/1
200 CAPSULE ORAL 3 TIMES DAILY PRN
Qty: 21 CAPSULE | Refills: 0 | Status: SHIPPED | OUTPATIENT
Start: 2024-12-12 | End: 2024-12-19

## 2024-12-12 RX ORDER — BROMPHENIRAMINE MALEATE, PSEUDOEPHEDRINE HYDROCHLORIDE, AND DEXTROMETHORPHAN HYDROBROMIDE 2; 30; 10 MG/5ML; MG/5ML; MG/5ML
5 SYRUP ORAL 4 TIMES DAILY PRN
Qty: 473 ML | Refills: 0 | Status: CANCELLED | OUTPATIENT
Start: 2024-12-12

## 2024-12-12 RX ADMIN — KETOROLAC TROMETHAMINE 30 MG: 30 INJECTION, SOLUTION INTRAMUSCULAR; INTRAVENOUS at 14:38

## 2024-12-12 NOTE — PROGRESS NOTES
Chief Complaint   Patient presents with    Cough    Sore Throat           Nausea           Fatigue    Migraine        Linda Nam  has a past medical history of Acid reflux, Allergic rhinitis, Anemia, Arthritis, Asthma, Bile salt-induced diarrhea, Depression, H/O bone density study, History of pulmonary embolism, Hypertension, Limb swelling, Moderate episode of recurrent major depressive disorder (02/03/2020), Muscle cramp, nocturnal (07/17/2018), Pap smear for cervical cancer screening (2000), Renal insufficiency (04/12/2021), Seasonal allergies, SOB (shortness of breath), Venous insufficiency of left leg (07/17/2018), and Visit for screening mammogram (10/2019).    HPI    History of Present Illness  The patient is a 69-year-old female who presents today for cough, sore throat, runny nose, headache, nausea, and fatigue.    She reports experiencing symptoms of a runny nose, headache, and cough since yesterday. She describes her cough as severe, often inducing a gag reflex, particularly when exposed to strong odors such as Listerine. She has not sought relief from over-the-counter medications due to apprehension. She reports no chest pain associated with her cough. She has not experienced anosmia or ageusia during her current illness. She has previously tolerated steroid treatments well. She has been using her albuterol inhaler, which provides some relief.    She also reports intermittent dyspnea, which is somewhat alleviated by the use of her albuterol inhaler. She has an adequate supply of her inhalers at home.    She rates her current headache intensity as 8 out of 10.     She expresses fatigue and exhaustion, despite having slept well the previous night. She has been maintaining hydration but has not been eating due to fear of vomiting.    She has been experiencing intermittent fevers, with episodes of feeling both hot and cold.    MEDICATIONS  Current: albuterol      Allergies   Allergen  Reactions    Contrast Dye (Echo Or Unknown Ct/Mr) Hives    Iodinated Contrast Media Hives    Norvasc [Amlodipine] Shortness Of Breath    Penicillins Swelling    Sulfa Antibiotics Rash         Current Outpatient Medications:     albuterol sulfate  (90 Base) MCG/ACT inhaler, Inhale 2 puffs Every 4 (Four) Hours As Needed for Wheezing., Disp: 18 g, Rfl: 2    busPIRone (BUSPAR) 15 MG tablet, Take 1 tablet by mouth 2 (Two) Times a Day for 180 days., Disp: 180 tablet, Rfl: 1    Calcium 500 MG tablet, Take 500 mg by mouth Daily., Disp: 30 tablet, Rfl: 1    carvedilol (COREG) 3.125 MG tablet, Take 2 tabs PO in the am and one tab pm, Disp: 270 tablet, Rfl: 1    Cyanocobalamin (Vitamin B-12) 1000 MCG sublingual tablet, Place 1 tablet under the tongue Daily., Disp: 30 each, Rfl: 3    fexofenadine (Allegra Allergy) 180 MG tablet, Take 1 tablet by mouth Daily., Disp: 10 tablet, Rfl: 0    fluticasone (FLONASE) 50 MCG/ACT nasal spray, Administer 2 sprays into the nostril(s) as directed by provider Daily., Disp: 15.8 mL, Rfl: 0    hydroCHLOROthiazide 25 MG tablet, Take 1 tablet by mouth Daily for 180 days., Disp: 90 tablet, Rfl: 1    losartan (COZAAR) 50 MG tablet, TAKE 2 TABLETS BY MOUTH DAILY, Disp: 180 tablet, Rfl: 1    magnesium oxide (MAG-OX) 400 MG tablet, Take 1 tablet by mouth Daily., Disp: 30 tablet, Rfl: 1    methocarbamol (ROBAXIN) 500 MG tablet, Take 1 tablet by mouth 3 (Three) Times a Day As Needed for Muscle Spasms., Disp: 90 tablet, Rfl: 1    omeprazole (priLOSEC) 40 MG capsule, Take 1 capsule by mouth Daily., Disp: 90 capsule, Rfl: 1    vitamin D (ERGOCALCIFEROL) 1.25 MG (51100 UT) capsule capsule, Take 1 capsule by mouth 1 (One) Time Per Week., Disp: 6 capsule, Rfl: 0    benzonatate (TESSALON) 200 MG capsule, Take 1 capsule by mouth 3 (Three) Times a Day As Needed for Cough for up to 7 days., Disp: 21 capsule, Rfl: 0    methylPREDNISolone (MEDROL) 4 MG dose pack, Take as directed on package instructions.,  Disp: 21 tablet, Rfl: 0    ondansetron (Zofran) 4 MG tablet, Take 1 tablet by mouth Every 8 (Eight) Hours As Needed for Nausea or Vomiting., Disp: 28 tablet, Rfl: 0    Patient Active Problem List   Diagnosis    Abnormal bone density screening    Pap smear for cervical cancer screening    Allergic rhinitis    Anemia    Arthritis    Asthma    Depression    Dyspeptic diarrhea    Esophageal reflux    History of pulmonary embolism    Hypertension    Venous insufficiency of left leg    Limb swelling    Moderate episode of recurrent major depressive disorder    Muscle cramp, nocturnal    Renal insufficiency    Shortness of breath    Osteoarthritis of both knees    Class 2 severe obesity due to excess calories with serious comorbidity and body mass index (BMI) of 35.0 to 35.9 in adult        Past Surgical History:   Procedure Laterality Date    APPENDECTOMY       SECTION      CHOLECYSTECTOMY      COLONOSCOPY  2014    normal    GALLBLADDER SURGERY      HYSTERECTOMY      OTHER SURGICAL HISTORY      mass removal on neck    PLANTAR FASCIA SURGERY Left     TONSILLECTOMY         Social History     Socioeconomic History    Marital status:    Tobacco Use    Smoking status: Never     Passive exposure: Never    Smokeless tobacco: Never   Vaping Use    Vaping status: Never Used   Substance and Sexual Activity    Alcohol use: Not Currently    Drug use: Never    Sexual activity: Defer       Family History   Problem Relation Age of Onset    Hypertension Mother     Cancer Mother     Arthritis Mother     Osteoporosis Mother     Hypertension Father     Cancer Father     Other Father         blood diseases    Hypertension Sister     Cancer Sister     Arthritis Sister        Family history, surgical history, past medical history, Allergies and med's reviewed with patient today and updated in [employer] EMR.     ROS:  Review of Systems   Constitutional:  Positive for chills, diaphoresis and fatigue.   HENT:   "Positive for congestion, postnasal drip and rhinorrhea.    Respiratory:  Positive for cough.    Cardiovascular: Negative.    Gastrointestinal:  Positive for nausea.   Genitourinary: Negative.    Neurological:  Positive for headache.   Psychiatric/Behavioral: Negative.         OBJECTIVE:  Vitals:    12/12/24 1318   BP: 149/83   Pulse: 87   Temp: 98.4 °F (36.9 °C)   SpO2: 98%   Weight: 105 kg (232 lb 6.4 oz)   Height: 147.3 cm (58\")     Body mass index is 48.57 kg/m².  No LMP recorded. Patient has had a hysterectomy.    Physical Exam  Cardiovascular:      Rate and Rhythm: Normal rate and regular rhythm.      Pulses: Normal pulses.      Heart sounds: Normal heart sounds.   Pulmonary:      Effort: Pulmonary effort is normal.      Breath sounds: Normal breath sounds.   Neurological:      General: No focal deficit present.      Mental Status: She is oriented to person, place, and time. Mental status is at baseline.         Physical Exam  Lungs were auscultated.    Procedures            Health Maintenance Due   Topic Date Due    ZOSTER VACCINE (1 of 2) Never done    MAMMOGRAM  05/03/2023    ANNUAL PHYSICAL  11/01/2024    COLORECTAL CANCER SCREENING  12/15/2024        Office Visit on 12/12/2024   Component Date Value Ref Range Status    SARS Antigen 12/12/2024 Detected (A)  Not Detected, Presumptive Negative Final    Influenza A Antigen DANIELA 12/12/2024 Not Detected  Not Detected Final    Influenza B Antigen DANIELA 12/12/2024 Not Detected  Not Detected Final    Internal Control 12/12/2024 Passed  Passed Final    Lot Number 12/12/2024 [id number]   Final    Expiration Date 12/12/2024 10/23/2025   Final    Rapid Strep A Screen 12/12/2024 Negative  Negative, VALID, INVALID, Not Performed Final    Internal Control 12/12/2024 Passed  Passed Final    Lot Number 12/12/2024 [id number]   Final    Expiration Date 12/12/2024 02/16/2026   Final   Lab on 11/22/2024   Component Date Value Ref Range Status    VITAMIN B7 11/22/2024 <0.05 (L)  0.05 - " 0.83 ng/mL Final    Magnesium 11/22/2024 1.1 (L)  1.6 - 2.4 mg/dL Final    Ionized Calcium 11/22/2024 1.09 (L)  1.15 - 1.35 mmol/L Final    25 Hydroxy, Vitamin D 11/22/2024 7.2 (L)  30.0 - 100.0 ng/ml Final    Vitamin B-12 11/22/2024 366  211 - 946 pg/mL Final    Vitamin B6 11/22/2024 3.0 (L)  3.4 - 65.2 ug/L Final                                 Deficiency:         <3.4                               Marginal:      3.4 - 5.1                               Adequate:           >5.1    Glucose 11/22/2024 79  65 - 99 mg/dL Final    BUN 11/22/2024 27 (H)  8 - 23 mg/dL Final    Creatinine 11/22/2024 1.63 (H)  0.57 - 1.00 mg/dL Final    Sodium 11/22/2024 140  136 - 145 mmol/L Final    Potassium 11/22/2024 3.7  3.5 - 5.2 mmol/L Final    Slight hemolysis detected by analyzer. Result may be falsely elevated.    Chloride 11/22/2024 103  98 - 107 mmol/L Final    CO2 11/22/2024 20.7 (L)  22.0 - 29.0 mmol/L Final    Calcium 11/22/2024 8.2 (L)  8.6 - 10.5 mg/dL Final    Total Protein 11/22/2024 7.6  6.0 - 8.5 g/dL Final    Albumin 11/22/2024 4.1  3.5 - 5.2 g/dL Final    ALT (SGPT) 11/22/2024 13  1 - 33 U/L Final    AST (SGOT) 11/22/2024 18  1 - 32 U/L Final    Alkaline Phosphatase 11/22/2024 141 (H)  39 - 117 U/L Final    Total Bilirubin 11/22/2024 0.4  0.0 - 1.2 mg/dL Final    Globulin 11/22/2024 3.5  gm/dL Final    A/G Ratio 11/22/2024 1.2  g/dL Final    BUN/Creatinine Ratio 11/22/2024 16.6  7.0 - 25.0 Final    Anion Gap 11/22/2024 16.3 (H)  5.0 - 15.0 mmol/L Final    eGFR 11/22/2024 34.4 (L)  >60.0 mL/min/1.73 Final    Total Cholesterol 11/22/2024 144  0 - 200 mg/dL Final    Triglycerides 11/22/2024 119  0 - 150 mg/dL Final    HDL Cholesterol 11/22/2024 34 (L)  40 - 60 mg/dL Final    LDL Cholesterol  11/22/2024 88  0 - 100 mg/dL Final    VLDL Cholesterol 11/22/2024 22  5 - 40 mg/dL Final    LDL/HDL Ratio 11/22/2024 2.54   Final    WBC 11/22/2024 10.00  3.40 - 10.80 10*3/mm3 Final    RBC 11/22/2024 4.25  3.77 - 5.28 10*6/mm3  Final    Hemoglobin 11/22/2024 12.7  12.0 - 15.9 g/dL Final    Hematocrit 11/22/2024 38.3  34.0 - 46.6 % Final    MCV 11/22/2024 90.1  79.0 - 97.0 fL Final    MCH 11/22/2024 29.9  26.6 - 33.0 pg Final    MCHC 11/22/2024 33.2  31.5 - 35.7 g/dL Final    RDW 11/22/2024 13.8  12.3 - 15.4 % Final    RDW-SD 11/22/2024 45.0  37.0 - 54.0 fl Final    MPV 11/22/2024 11.0  6.0 - 12.0 fL Final    Platelets 11/22/2024 328  140 - 450 10*3/mm3 Final    Neutrophil % 11/22/2024 73.2  42.7 - 76.0 % Final    Lymphocyte % 11/22/2024 18.0 (L)  19.6 - 45.3 % Final    Monocyte % 11/22/2024 6.6  5.0 - 12.0 % Final    Eosinophil % 11/22/2024 1.2  0.3 - 6.2 % Final    Basophil % 11/22/2024 0.5  0.0 - 1.5 % Final    Immature Grans % 11/22/2024 0.5  0.0 - 0.5 % Final    Neutrophils, Absolute 11/22/2024 7.32 (H)  1.70 - 7.00 10*3/mm3 Final    Lymphocytes, Absolute 11/22/2024 1.80  0.70 - 3.10 10*3/mm3 Final    Monocytes, Absolute 11/22/2024 0.66  0.10 - 0.90 10*3/mm3 Final    Eosinophils, Absolute 11/22/2024 0.12  0.00 - 0.40 10*3/mm3 Final    Basophils, Absolute 11/22/2024 0.05  0.00 - 0.20 10*3/mm3 Final    Immature Grans, Absolute 11/22/2024 0.05  0.00 - 0.05 10*3/mm3 Final    nRBC 11/22/2024 0.0  0.0 - 0.2 /100 WBC Final   Office Visit on 11/22/2024   Component Date Value Ref Range Status    TSH 11/22/2024 2.200  0.270 - 4.200 uIU/mL Final       Results        ASSESSMENT/ PLAN:    Diagnoses and all orders for this visit:    1. COVID-19 (Primary)  -     methylPREDNISolone (MEDROL) 4 MG dose pack; Take as directed on package instructions.  Dispense: 21 tablet; Refill: 0  -     ondansetron (Zofran) 4 MG tablet; Take 1 tablet by mouth Every 8 (Eight) Hours As Needed for Nausea or Vomiting.  Dispense: 28 tablet; Refill: 0    2. Sore throat  -     POCT rapid strep A    3. Cough in adult  -     POCT SARS-CoV-2 Antigen DANIELA + Flu  -     benzonatate (TESSALON) 200 MG capsule; Take 1 capsule by mouth 3 (Three) Times a Day As Needed for Cough for up to  7 days.  Dispense: 21 capsule; Refill: 0      Your COVID-19 test result is positive.  You need to quarantine yourself until 3 things have happened:     1 at least 5 days of passenger symptoms first appeared  2. AND you have had no fever for at least 24 hours   3. AND other symptoms have improved (coughing, shortness of breath, etc.     If you develop emergency warning signs for COVID-19, get attention immediately.  Emergency warning signs include:  Severe trouble breathing  Persistent pain or pressure in the chest  If confusion or inability to wake  Bluish lips or face  *This list is not all inclusive    The CDC has guidance on COVID and other details.    Take prescribed medications until all taken.  Tylenol or Advil as needed for pain and fever. Warm salt water gargles (1 tablespoon of salt per 1 cup of water) will help with sore throat.     Assessment & Plan  1. COVID-19.  She reports symptoms of cough, sore throat, runny nose, headache, nausea, and fatigue starting yesterday. She has been using her albuterol inhaler, which helps with her breathing difficulties. She has not taken any over-the-counter medications due to fear of adverse effects. She reports a severe headache, rated 8/10, and nausea triggered by strong smells. She is advised to stay hydrated and eat only what she can keep down. She is instructed to rest and avoid strenuous activities. If her symptoms worsen or she experiences significant shortness of breath unrelieved by albuterol, she should seek immediate medical attention at the hospital. A Toradol injection will be administered for her headache. A prescription for Zofran dissolvable tablets will be provided to manage her nausea. A cough suppressant will be prescribed to alleviate her cough. A steroid dose pack will be prescribed to address her congestion. [facility] [address]. A work note will be provided for her.    2. Dyspnea.  She reports difficulty  breathing at times, which is relieved by her albuterol inhaler. She is advised to continue using her inhaler as needed. If her shortness of breath worsens and is not relieved by albuterol, she should seek immediate medical attention at the hospital.    3. Headache.  She reports a severe headache, rated 8/10. A Toradol injection will be administered to help alleviate the pain.    4. Nausea.  She reports nausea triggered by strong smells. A prescription for Zofran dissolvable tablets will be provided to manage her nausea.    5. Fatigue.  She reports feeling exhausted. She is advised to rest and avoid strenuous activities.    6. Fever.  She reports intermittent fever. She is advised to monitor her temperature and stay hydrated.    Orders Placed Today:     New Medications Ordered This Visit   Medications    methylPREDNISolone (MEDROL) 4 MG dose pack     Sig: Take as directed on package instructions.     Dispense:  21 tablet     Refill:  0    ondansetron (Zofran) 4 MG tablet     Sig: Take 1 tablet by mouth Every 8 (Eight) Hours As Needed for Nausea or Vomiting.     Dispense:  28 tablet     Refill:  0    benzonatate (TESSALON) 200 MG capsule     Sig: Take 1 capsule by mouth 3 (Three) Times a Day As Needed for Cough for up to 7 days.     Dispense:  21 capsule     Refill:  0        Management Plan:     An After Visit Summary was printed and given to the patient at discharge.    Follow-up: Return if symptoms worsen or fail to improve.    Patient or patient representative verbalized consent for the use of Ambient Listening during the visit with  ARIN Hollis for chart documentation. 12/12/2024  13:54 EST    ARIN Hollis 12/12/2024 14:09 EST  This note was electronically signed.

## 2024-12-12 NOTE — LETTER
December 12, 2024     Patient: Loan Nam   YOB: 1955   Date of Visit: 12/12/2024       To Whom It May Concern:    It is my medical opinion that Loan Nam may return to work in five days as long as her symptoms have been improving for 24 hours.            Sincerely,        ARIN Hollis    CC: No Recipients

## 2024-12-12 NOTE — LETTER
December 16, 2024     Patient: Loan Nam   YOB: 1955   Date of Visit: 12/12/2024       To Whom It May Concern:    It is my medical opinion that Loan Nam  may return to work on 12.20.24 or before if symptoms have been improving for 24 hours.           Sincerely,        ARIN Hollis    CC: No Recipients

## 2024-12-16 ENCOUNTER — TELEPHONE (OUTPATIENT)
Dept: FAMILY MEDICINE CLINIC | Facility: CLINIC | Age: 69
End: 2024-12-16
Payer: COMMERCIAL

## 2024-12-16 ENCOUNTER — DOCUMENTATION (OUTPATIENT)
Dept: FAMILY MEDICINE CLINIC | Facility: CLINIC | Age: 69
End: 2024-12-16
Payer: COMMERCIAL

## 2024-12-16 DIAGNOSIS — H04.129 DRY EYE: Primary | ICD-10-CM

## 2024-12-16 RX ORDER — CARBOXYMETHYLCELLULOSE SODIUM 10 MG/ML
2 GEL OPHTHALMIC 3 TIMES DAILY PRN
Qty: 1 EACH | Refills: 0 | Status: SHIPPED | OUTPATIENT
Start: 2024-12-16

## 2024-12-16 NOTE — TELEPHONE ENCOUNTER
Caller: Loan Nam    Relationship: Self    Best call back number: [phone]     What form or medical record are you requesting: WORK EXCUSE     Who is requesting this form or medical record from you: EMPLOYER    How would you like to receive the form or medical records (pick-up, mail, fax): PATIENT NEEDS A CALL WHEN READY FOR       Timeframe paperwork needed: ASAP    Additional notes: PATIENT STATES SHE NEEDS HER WORK EXCUSE TO HAVE THE DATE IT WAS WRITTEN ON IT. PATIENT ALSO NEEDS THIS TO SAY SHE CAN RETURN TO WORK 12.20.24 OR BEFORE IF SYMPTOMS SUBSIDE AND HAVE BEEN CLEAR FOR 24 HOURS.

## 2024-12-16 NOTE — TELEPHONE ENCOUNTER
Caller: Viv Loan    Relationship: Self    Best call back number: [phone]    What form or medical record are you requesting: EXTENDED WORK EXCUSE FOR A FEW MORE DAYS DUE TO STILL HAVING COVID SYMPTOMS     Who is requesting this form or medical record from you: WORK    How would you like to receive the form or medical records (pick-up, mail, fax):      Timeframe paperwork needed: ASAP     Additional notes: PATIENT GIVES VERBAL PERMISSION FOR  CARLTON RIDER OR REBA SALCEDO TO  EXTENDED WORK EXCUSE.

## 2025-01-09 DIAGNOSIS — E55.9 VITAMIN D DEFICIENCY: ICD-10-CM

## 2025-01-09 RX ORDER — ERGOCALCIFEROL 1.25 MG/1
50000 CAPSULE, LIQUID FILLED ORAL WEEKLY
Qty: 6 CAPSULE | Refills: 0 | Status: SHIPPED | OUTPATIENT
Start: 2025-01-09

## 2025-02-21 DIAGNOSIS — K21.9 GASTROESOPHAGEAL REFLUX DISEASE, UNSPECIFIED WHETHER ESOPHAGITIS PRESENT: ICD-10-CM

## 2025-02-21 DIAGNOSIS — F41.9 ANXIETY: ICD-10-CM

## 2025-02-21 RX ORDER — OMEPRAZOLE 40 MG/1
40 CAPSULE, DELAYED RELEASE ORAL DAILY
Qty: 90 CAPSULE | Refills: 1 | Status: SHIPPED | OUTPATIENT
Start: 2025-02-21

## 2025-02-21 RX ORDER — BUSPIRONE HYDROCHLORIDE 15 MG/1
15 TABLET ORAL 2 TIMES DAILY
Qty: 180 TABLET | Refills: 1 | Status: SHIPPED | OUTPATIENT
Start: 2025-02-21

## 2025-03-07 ENCOUNTER — TELEPHONE (OUTPATIENT)
Dept: ORTHOPEDIC SURGERY | Facility: CLINIC | Age: 70
End: 2025-03-07

## 2025-03-07 NOTE — TELEPHONE ENCOUNTER
Hub staff attempted to follow warm transfer process and was unsuccessful     Caller: Loan Nam    Relationship to patient: Self    Best call back number: [phone]    Patient is needing: PATIENT RETURNED CALL REGARDING COMING IN FOR AN EARLIER APPT TODAY. SHE CAN NOT ANSWER HER PHONE OR CHECK HER VOICEMAIL AT WORK SO SHE WILL BE THERE FOR HER 3:00 APPT. OKAY TO LEAVE A VOICEAMAIL IF NECESSARY.

## 2025-03-14 ENCOUNTER — OFFICE VISIT (OUTPATIENT)
Dept: ORTHOPEDIC SURGERY | Facility: CLINIC | Age: 70
End: 2025-03-14
Payer: COMMERCIAL

## 2025-03-14 VITALS
HEIGHT: 58 IN | DIASTOLIC BLOOD PRESSURE: 85 MMHG | BODY MASS INDEX: 46.73 KG/M2 | WEIGHT: 222.6 LBS | SYSTOLIC BLOOD PRESSURE: 155 MMHG | OXYGEN SATURATION: 98 % | HEART RATE: 90 BPM

## 2025-03-14 DIAGNOSIS — M17.0 OSTEOARTHRITIS OF BOTH KNEES, UNSPECIFIED OSTEOARTHRITIS TYPE: Primary | ICD-10-CM

## 2025-03-14 RX ORDER — LIDOCAINE HYDROCHLORIDE 10 MG/ML
5 INJECTION, SOLUTION INFILTRATION; PERINEURAL
Status: COMPLETED | OUTPATIENT
Start: 2025-03-14 | End: 2025-03-14

## 2025-03-14 RX ORDER — TRIAMCINOLONE ACETONIDE 40 MG/ML
40 INJECTION, SUSPENSION INTRA-ARTICULAR; INTRAMUSCULAR
Status: COMPLETED | OUTPATIENT
Start: 2025-03-14 | End: 2025-03-14

## 2025-03-14 RX ADMIN — TRIAMCINOLONE ACETONIDE 40 MG: 40 INJECTION, SUSPENSION INTRA-ARTICULAR; INTRAMUSCULAR at 14:02

## 2025-03-14 RX ADMIN — TRIAMCINOLONE ACETONIDE 40 MG: 40 INJECTION, SUSPENSION INTRA-ARTICULAR; INTRAMUSCULAR at 14:03

## 2025-03-14 RX ADMIN — LIDOCAINE HYDROCHLORIDE 5 ML: 10 INJECTION, SOLUTION INFILTRATION; PERINEURAL at 14:02

## 2025-03-14 RX ADMIN — LIDOCAINE HYDROCHLORIDE 5 ML: 10 INJECTION, SOLUTION INFILTRATION; PERINEURAL at 14:03

## 2025-03-14 NOTE — PROGRESS NOTES
"Chief Complaint  Follow-up of the Left Knee and Follow-up of the Right Knee     Subjective      Loan Nam presents to [facility]  for follow up of bilateral knees.   She has managed this conservatively with intermittent injections in the past. She previously received bilateral knee steroid injections in office on 9/6/24. She has had increase pain the last few weeks and increase pain with prolonged standing, ambulation and stairs.     Allergies   Allergen Reactions    Contrast Dye (Echo Or Unknown Ct/Mr) Hives    Iodinated Contrast Media Hives    Norvasc [Amlodipine] Shortness Of Breath    Penicillins Swelling    Sulfa Antibiotics Rash        Social History     Socioeconomic History    Marital status:    Tobacco Use    Smoking status: Never     Passive exposure: Never    Smokeless tobacco: Never   Vaping Use    Vaping status: Never Used   Substance and Sexual Activity    Alcohol use: Not Currently    Drug use: Never    Sexual activity: Defer        I reviewed the patient's chief complaint, history of present illness, review of systems, past medical history, surgical history, family history, social history, medications, and allergy list.     Review of Systems     Constitutional: Denies fevers, chills, weight loss  Cardiovascular: Denies chest pain, shortness of breath  Skin: Denies rashes, acute skin changes  Neurologic: Denies headache, loss of consciousness      Vital Signs:   /85   Pulse 90   Ht 147.3 cm (58\")   Wt 101 kg (222 lb 9.6 oz)   SpO2 98%   BMI 46.52 kg/m²          Physical Exam  General: Alert. No acute distress    Ortho Exam        Bilateral knees: Skin is warm, dry, and intact. Mild to moderate edema. Multiple bilateral lower extremity varicosities present. Tenderness to palpation of joint lines. -3 to -5 degrees of extension and flexion 115-120 degrees. Full plantarflexion and dorsiflexion of the ankles. Sensation and distal neurovascular intact. " Smooth sit to stand transition. Patient fully weightbearing with nonantalgic gait.          Large Joint: R knee  Date/Time: 3/14/2025 2:02 PM  Consent given by: patient  Site marked: site marked  Timeout: Immediately prior to procedure a time out was called to verify the correct patient, procedure, equipment, support staff and site/side marked as required   Supporting Documentation  Indications: pain   Procedure Details  Location: knee - R knee  Preparation: Patient was prepped and draped in the usual sterile fashion  Needle gauge: 21g.  Medications administered: 5 mL lidocaine 1 %; 40 mg triamcinolone acetonide 40 MG/ML  Patient tolerance: patient tolerated the procedure well with no immediate complications      Large Joint: L knee  Date/Time: 3/14/2025 2:03 PM  Consent given by: patient  Site marked: site marked  Timeout: Immediately prior to procedure a time out was called to verify the correct patient, procedure, equipment, support staff and site/side marked as required   Supporting Documentation  Indications: pain   Procedure Details  Location: knee - L knee  Preparation: Patient was prepped and draped in the usual sterile fashion  Needle gauge: 21g.  Medications administered: 5 mL lidocaine 1 %; 40 mg triamcinolone acetonide 40 MG/ML  Patient tolerance: patient tolerated the procedure well with no immediate complications    This injection documentation was Scribed for Mike Mckinney MD by Steph Her MA.  03/14/25   14:03 EDT        Imaging Results (Most Recent)       None             Result Review :          Assessment and Plan     Diagnoses and all orders for this visit:    1. Osteoarthritis of both knees, unspecified osteoarthritis type (Primary)        Discussed the treatment plan with the patient.     Discussed the risks and benefits of conservative measures.  The patient expressed understanding and wished to proceed with bilateral knee steroid injections.  She tolerated the injections. Well.       Discussed with the patient that due to the steroid injection given today in the office they may see an increase in blood sugar for a few days. Advised patient to monitor sugar after receiving the injection.     Discussed possibility of a reaction from the injection.  Discussed the possibility that the injection may not completely improve or remove the pain.  Discussed the risk of infection.      Call or return if worsening symptoms.    Follow Up     PRN      Patient was given instructions and counseling regarding her condition or for health maintenance advice. Please see specific information pulled into the AVS if appropriate.     Scribed for Mike Mckinney MD by Ebony Benito MA.  03/14/25   13:56 EDT    I have personally performed the services described in this document as scribed by the above individual and it is both accurate and complete. Mike Mckinney MD 03/14/25

## 2025-04-03 ENCOUNTER — OFFICE VISIT (OUTPATIENT)
Dept: FAMILY MEDICINE CLINIC | Facility: CLINIC | Age: 70
End: 2025-04-03
Payer: COMMERCIAL

## 2025-04-03 VITALS
HEIGHT: 58 IN | HEART RATE: 78 BPM | TEMPERATURE: 98.1 F | SYSTOLIC BLOOD PRESSURE: 133 MMHG | BODY MASS INDEX: 46.39 KG/M2 | OXYGEN SATURATION: 100 % | DIASTOLIC BLOOD PRESSURE: 68 MMHG | WEIGHT: 221 LBS

## 2025-04-03 DIAGNOSIS — J45.901 ASTHMA EXACERBATION, MILD: Primary | ICD-10-CM

## 2025-04-03 DIAGNOSIS — J45.909 UNCOMPLICATED ASTHMA, UNSPECIFIED ASTHMA SEVERITY, UNSPECIFIED WHETHER PERSISTENT: ICD-10-CM

## 2025-04-03 DIAGNOSIS — J40 BRONCHITIS: ICD-10-CM

## 2025-04-03 RX ORDER — ALBUTEROL SULFATE 90 UG/1
2 INHALANT RESPIRATORY (INHALATION) EVERY 4 HOURS PRN
Qty: 18 G | Refills: 2 | Status: SHIPPED | OUTPATIENT
Start: 2025-04-03

## 2025-04-03 RX ORDER — DOXYCYCLINE 100 MG/1
100 CAPSULE ORAL 2 TIMES DAILY
Qty: 14 CAPSULE | Refills: 0 | Status: SHIPPED | OUTPATIENT
Start: 2025-04-03 | End: 2025-04-10

## 2025-04-03 NOTE — PROGRESS NOTES
Chief Complaint   Patient presents with    Cough     Ongoing since friday    Wheezing     Ongoing since friday    Nasal Congestion     Ongoing since friday        Linda Nam  has a past medical history of Acid reflux, Allergic rhinitis, Anemia, Arthritis, Asthma, Bile salt-induced diarrhea, Depression, H/O bone density study, History of pulmonary embolism, Hypertension, Limb swelling, Moderate episode of recurrent major depressive disorder (02/03/2020), Muscle cramp, nocturnal (07/17/2018), Pap smear for cervical cancer screening (2000), Renal insufficiency (04/12/2021), Seasonal allergies, SOB (shortness of breath), Venous insufficiency of left leg (07/17/2018), and Visit for screening mammogram (10/2019).    HPI    History of Present Illness  The patient is a 69-year-old female who presents today for wheezing, cough, and runny nose.    She reports experiencing rhinorrhea with yellowish nasal discharge, accompanied by a persistent cough. Her sister has also noted wheezing. These symptoms have been present since Friday night, approximately 6 days ago, and have neither improved nor worsened. She does not experience sinus pain or pressure but occasionally suffers from headaches, which she does not attribute to her blood pressure as it has been stable. She has been maintaining hydration by drinking water. She has not purchased any cough syrup due to her blood pressure condition. She has previously used Tessalon Perles for her cough, but it was ineffective. She has not taken doxycycline before. She has been self-medicating with Benadryl at night and Claritin RediTabs during the day, avoiding decongestants due to her blood pressure condition. However, these medications have not provided relief. She has a history of bronchitis, typically occurring around this time of year, but does not believe her current symptoms are indicative of COVID-19 or influenza, as they differ from her previous experiences with  these illnesses. Her cough does not disrupt her sleep, but she notes that Benadryl induces sleep for approximately 5 hours. She has an old albuterol inhaler at home.    ALLERGIES  The patient is allergic to PENICILLIN and SULFA.      Allergies   Allergen Reactions    Contrast Dye (Echo Or Unknown Ct/Mr) Hives    Iodinated Contrast Media Hives    Norvasc [Amlodipine] Shortness Of Breath    Penicillins Swelling    Sulfa Antibiotics Rash         Current Outpatient Medications:     albuterol sulfate  (90 Base) MCG/ACT inhaler, Inhale 2 puffs Every 4 (Four) Hours As Needed for Wheezing., Disp: 18 g, Rfl: 2    busPIRone (BUSPAR) 15 MG tablet, TAKE 1 TABLET BY MOUTH 2 TIMES A DAY, Disp: 180 tablet, Rfl: 1    Calcium 500 MG tablet, Take 500 mg by mouth Daily., Disp: 30 tablet, Rfl: 1    carboxymethylcellulose sod (Dry Eye Relief) 1 % gel eye gel, Administer 2 drops to both eyes 3 (Three) Times a Day As Needed (dry eye)., Disp: 1 each, Rfl: 0    carvedilol (COREG) 3.125 MG tablet, Take 2 tabs PO in the am and one tab pm, Disp: 270 tablet, Rfl: 1    Cyanocobalamin (Vitamin B-12) 1000 MCG sublingual tablet, Place 1 tablet under the tongue Daily., Disp: 30 each, Rfl: 3    fexofenadine (Allegra Allergy) 180 MG tablet, Take 1 tablet by mouth Daily., Disp: 10 tablet, Rfl: 0    fluticasone (FLONASE) 50 MCG/ACT nasal spray, Administer 2 sprays into the nostril(s) as directed by provider Daily., Disp: 15.8 mL, Rfl: 0    hydroCHLOROthiazide 25 MG tablet, Take 1 tablet by mouth Daily for 180 days., Disp: 90 tablet, Rfl: 1    losartan (COZAAR) 50 MG tablet, TAKE 2 TABLETS BY MOUTH DAILY, Disp: 180 tablet, Rfl: 1    magnesium oxide (MAG-OX) 400 MG tablet, Take 1 tablet by mouth Daily., Disp: 30 tablet, Rfl: 1    methocarbamol (ROBAXIN) 500 MG tablet, Take 1 tablet by mouth 3 (Three) Times a Day As Needed for Muscle Spasms., Disp: 90 tablet, Rfl: 1    methylPREDNISolone (MEDROL) 4 MG dose pack, Take as directed on package  instructions., Disp: 21 tablet, Rfl: 0    omeprazole (priLOSEC) 40 MG capsule, TAKE 1 CAPSULE BY MOUTH DAILY, Disp: 90 capsule, Rfl: 1    ondansetron (Zofran) 4 MG tablet, Take 1 tablet by mouth Every 8 (Eight) Hours As Needed for Nausea or Vomiting., Disp: 28 tablet, Rfl: 0    vitamin D (ERGOCALCIFEROL) 1.25 MG (57371 UT) capsule capsule, TAKE 1 CAPSULE BY MOUTH ONCE WEEKLY, Disp: 6 capsule, Rfl: 0    dextromethorphan 15 MG/5ML syrup, Take 10 mL by mouth 4 (Four) Times a Day As Needed for Cough., Disp: 118 mL, Rfl: 0    doxycycline (VIBRAMYCIN) 100 MG capsule, Take 1 capsule by mouth 2 (Two) Times a Day for 7 days., Disp: 14 capsule, Rfl: 0    Patient Active Problem List   Diagnosis    Abnormal bone density screening    Pap smear for cervical cancer screening    Allergic rhinitis    Anemia    Arthritis    Asthma    Depression    Dyspeptic diarrhea    Esophageal reflux    History of pulmonary embolism    Hypertension    Venous insufficiency of left leg    Limb swelling    Moderate episode of recurrent major depressive disorder    Muscle cramp, nocturnal    Renal insufficiency    Shortness of breath    Osteoarthritis of both knees    Class 2 severe obesity due to excess calories with serious comorbidity and body mass index (BMI) of 35.0 to 35.9 in adult        Past Surgical History:   Procedure Laterality Date    APPENDECTOMY       SECTION      CHOLECYSTECTOMY      COLONOSCOPY  2014    normal    GALLBLADDER SURGERY      HYSTERECTOMY      OTHER SURGICAL HISTORY      mass removal on neck    PLANTAR FASCIA SURGERY Left     TONSILLECTOMY         Social History     Socioeconomic History    Marital status:    Tobacco Use    Smoking status: Never     Passive exposure: Never    Smokeless tobacco: Never   Vaping Use    Vaping status: Never Used   Substance and Sexual Activity    Alcohol use: Not Currently    Drug use: Never    Sexual activity: Defer       Family History   Problem Relation  "Age of Onset    Hypertension Mother     Cancer Mother     Arthritis Mother     Osteoporosis Mother     Hypertension Father     Cancer Father     Other Father         blood diseases    Hypertension Sister     Cancer Sister     Arthritis Sister        Family history, surgical history, past medical history, Allergies and med's reviewed with patient today and updated in [employer] EMR.     ROS:  Review of Systems   HENT:  Positive for congestion.    Respiratory:  Positive for cough and wheezing.    Cardiovascular: Negative.        OBJECTIVE:  Vitals:    04/03/25 0937   BP: 133/68   Pulse: 78   Temp: 98.1 °F (36.7 °C)   TempSrc: Temporal   SpO2: 100%   Weight: 100 kg (221 lb)   Height: 147.3 cm (58\")     Body mass index is 46.19 kg/m².  No LMP recorded. Patient has had a hysterectomy.    Physical Exam  HENT:      Nose:      Right Sinus: No maxillary sinus tenderness or frontal sinus tenderness.      Left Sinus: No maxillary sinus tenderness or frontal sinus tenderness.   Cardiovascular:      Rate and Rhythm: Normal rate and regular rhythm.      Pulses: Normal pulses.      Heart sounds: Normal heart sounds.   Pulmonary:      Effort: Pulmonary effort is normal.      Breath sounds: Normal breath sounds.   Neurological:      General: No focal deficit present.         Physical Exam  Lungs were auscultated.    Vital Signs  Blood pressure is normal.    Procedures            Health Maintenance Due   Topic Date Due    MAMMOGRAM  05/03/2023    ANNUAL PHYSICAL  11/01/2024    COLORECTAL CANCER SCREENING  12/15/2024        No visits with results within 30 Day(s) from this visit.   Latest known visit with results is:   Office Visit on 12/12/2024   Component Date Value Ref Range Status    SARS Antigen 12/12/2024 Detected (A)  Not Detected, Presumptive Negative Final    Influenza A Antigen DANIELA 12/12/2024 Not Detected  Not Detected Final    Influenza B Antigen DANIELA 12/12/2024 Not Detected  Not Detected Final    Internal Control 12/12/2024 " Passed  Passed Final    Lot Number 12/12/2024 [id number]   Final    Expiration Date 12/12/2024 10/23/2025   Final    Rapid Strep A Screen 12/12/2024 Negative  Negative, VALID, INVALID, Not Performed Final    Internal Control 12/12/2024 Passed  Passed Final    Lot Number 12/12/2024 [id number]   Final    Expiration Date 12/12/2024 02/16/2026   Final       Results        ASSESSMENT/ PLAN:    Diagnoses and all orders for this visit:    1. Asthma exacerbation, mild (Primary)  -     doxycycline (VIBRAMYCIN) 100 MG capsule; Take 1 capsule by mouth 2 (Two) Times a Day for 7 days.  Dispense: 14 capsule; Refill: 0    2. Uncomplicated asthma, unspecified asthma severity, unspecified whether persistent  Comments:  have reordered her inhaler to be sent to pharmacy.   Orders:  -     albuterol sulfate  (90 Base) MCG/ACT inhaler; Inhale 2 puffs Every 4 (Four) Hours As Needed for Wheezing.  Dispense: 18 g; Refill: 2    3. Bronchitis  -     albuterol sulfate  (90 Base) MCG/ACT inhaler; Inhale 2 puffs Every 4 (Four) Hours As Needed for Wheezing.  Dispense: 18 g; Refill: 2  -     doxycycline (VIBRAMYCIN) 100 MG capsule; Take 1 capsule by mouth 2 (Two) Times a Day for 7 days.  Dispense: 14 capsule; Refill: 0  -     dextromethorphan 15 MG/5ML syrup; Take 10 mL by mouth 4 (Four) Times a Day As Needed for Cough.  Dispense: 118 mL; Refill: 0        Assessment & Plan  1. Wheezing.  She reports wheezing, which her sister has also noticed. A prescription for an albuterol inhaler will be sent to [facility] pharmacy to help manage her symptoms.    2. Cough.  She has been experiencing a persistent cough since Friday night. A prescription for a cough suppressant that will not interfere with her blood pressure will be provided. She is advised to continue pushing fluids and get adequate rest.    3. Runny nose.  She reports a runny nose with yellow discharge. Doxycycline will be prescribed to address any potential respiratory  infection.    Orders Placed Today:     New Medications Ordered This Visit   Medications    albuterol sulfate  (90 Base) MCG/ACT inhaler     Sig: Inhale 2 puffs Every 4 (Four) Hours As Needed for Wheezing.     Dispense:  18 g     Refill:  2    doxycycline (VIBRAMYCIN) 100 MG capsule     Sig: Take 1 capsule by mouth 2 (Two) Times a Day for 7 days.     Dispense:  14 capsule     Refill:  0    dextromethorphan 15 MG/5ML syrup     Sig: Take 10 mL by mouth 4 (Four) Times a Day As Needed for Cough.     Dispense:  118 mL     Refill:  0        Management Plan:     An After Visit Summary was printed and given to the patient at discharge.    Follow-up: Return if symptoms worsen or fail to improve.    Patient or patient representative verbalized consent for the use of Ambient Listening during the visit with  ARIN Hollis for chart documentation. 4/3/2025  09:53 EDT    ARIN Hollis 4/3/2025 11:34 EDT  This note was electronically signed.

## 2025-04-11 ENCOUNTER — OFFICE VISIT (OUTPATIENT)
Dept: FAMILY MEDICINE CLINIC | Facility: CLINIC | Age: 70
End: 2025-04-11
Payer: COMMERCIAL

## 2025-04-11 VITALS
OXYGEN SATURATION: 97 % | HEART RATE: 89 BPM | BODY MASS INDEX: 44.94 KG/M2 | SYSTOLIC BLOOD PRESSURE: 141 MMHG | RESPIRATION RATE: 18 BRPM | WEIGHT: 215 LBS | DIASTOLIC BLOOD PRESSURE: 70 MMHG | TEMPERATURE: 98 F

## 2025-04-11 DIAGNOSIS — R11.2 NAUSEA AND VOMITING, UNSPECIFIED VOMITING TYPE: Primary | ICD-10-CM

## 2025-04-11 DIAGNOSIS — R09.82 PND (POST-NASAL DRIP): ICD-10-CM

## 2025-04-11 DIAGNOSIS — R19.7 DIARRHEA, UNSPECIFIED TYPE: ICD-10-CM

## 2025-04-11 RX ORDER — METHYLPREDNISOLONE 4 MG/1
TABLET ORAL
Qty: 21 TABLET | Refills: 0 | Status: SHIPPED | OUTPATIENT
Start: 2025-04-11

## 2025-04-11 RX ORDER — ONDANSETRON 4 MG/1
4 TABLET, FILM COATED ORAL EVERY 8 HOURS PRN
Qty: 28 TABLET | Refills: 0 | Status: SHIPPED | OUTPATIENT
Start: 2025-04-11

## 2025-04-11 RX ORDER — ACETAMINOPHEN 500 MG
1 TABLET ORAL DAILY
Qty: 90 CAPSULE | Refills: 3 | Status: SHIPPED | OUTPATIENT
Start: 2025-04-11

## 2025-04-11 NOTE — PROGRESS NOTES
Chief Complaint   Patient presents with    follow up    Headache    Diarrhea    Fever    Vomiting    Fatigue    Hypertension    Nasal Congestion        Linda Nam  has a past medical history of Acid reflux, Allergic rhinitis, Anemia, Arthritis, Asthma, Bile salt-induced diarrhea, Depression, H/O bone density study, History of pulmonary embolism, Hypertension, Limb swelling, Moderate episode of recurrent major depressive disorder (02/03/2020), Muscle cramp, nocturnal (07/17/2018), Pap smear for cervical cancer screening (2000), Renal insufficiency (04/12/2021), Seasonal allergies, SOB (shortness of breath), Venous insufficiency of left leg (07/17/2018), and Visit for screening mammogram (10/2019).    HPI    History of Present Illness  The patient is a 69-year-old female who presents today for following up on asthma exacerbation and bronchitis, back and stomach pain, and vomiting.    She reports persistent postnasal drip, which she attributes to a sinus infection. She has been experiencing nausea, vomiting, and diarrhea. Despite feeling hungry, she expresses fear of eating due to the risk of vomiting. She has not experienced any fevers. Her symptoms of vomiting and diarrhea started earlier in the week. She has been attempting to maintain her work schedule but was unable to do so today due to worsening back pain. She speculates that the back pain may be related to her gagging and retching. She reports no dysuria. She has been consuming crackers and Jell-O during her illness. She was previously treated with an antibiotic and cough medicine, which she completed yesterday. She does not have any remaining Zofran and is requesting a refill.    Allergies   Allergen Reactions    Contrast Dye (Echo Or Unknown Ct/Mr) Hives    Iodinated Contrast Media Hives    Norvasc [Amlodipine] Shortness Of Breath    Penicillins Swelling    Sulfa Antibiotics Rash         Current Outpatient Medications:     albuterol sulfate   (90 Base) MCG/ACT inhaler, Inhale 2 puffs Every 4 (Four) Hours As Needed for Wheezing., Disp: 18 g, Rfl: 2    busPIRone (BUSPAR) 15 MG tablet, TAKE 1 TABLET BY MOUTH 2 TIMES A DAY, Disp: 180 tablet, Rfl: 1    Calcium 500 MG tablet, Take 500 mg by mouth Daily., Disp: 30 tablet, Rfl: 1    carboxymethylcellulose sod (Dry Eye Relief) 1 % gel eye gel, Administer 2 drops to both eyes 3 (Three) Times a Day As Needed (dry eye)., Disp: 1 each, Rfl: 0    carvedilol (COREG) 3.125 MG tablet, Take 2 tabs PO in the am and one tab pm, Disp: 270 tablet, Rfl: 1    Cyanocobalamin (Vitamin B-12) 1000 MCG sublingual tablet, Place 1 tablet under the tongue Daily., Disp: 30 each, Rfl: 3    dextromethorphan 15 MG/5ML syrup, Take 10 mL by mouth 4 (Four) Times a Day As Needed for Cough., Disp: 118 mL, Rfl: 0    fexofenadine (Allegra Allergy) 180 MG tablet, Take 1 tablet by mouth Daily., Disp: 10 tablet, Rfl: 0    fluticasone (FLONASE) 50 MCG/ACT nasal spray, Administer 2 sprays into the nostril(s) as directed by provider Daily., Disp: 15.8 mL, Rfl: 0    hydroCHLOROthiazide 25 MG tablet, Take 1 tablet by mouth Daily for 180 days., Disp: 90 tablet, Rfl: 1    losartan (COZAAR) 50 MG tablet, TAKE 2 TABLETS BY MOUTH DAILY, Disp: 180 tablet, Rfl: 1    magnesium oxide (MAG-OX) 400 MG tablet, Take 1 tablet by mouth Daily., Disp: 30 tablet, Rfl: 1    methocarbamol (ROBAXIN) 500 MG tablet, Take 1 tablet by mouth 3 (Three) Times a Day As Needed for Muscle Spasms., Disp: 90 tablet, Rfl: 1    methylPREDNISolone (MEDROL) 4 MG dose pack, Take as directed on package instructions., Disp: 21 tablet, Rfl: 0    omeprazole (priLOSEC) 40 MG capsule, TAKE 1 CAPSULE BY MOUTH DAILY, Disp: 90 capsule, Rfl: 1    ondansetron (Zofran) 4 MG tablet, Take 1 tablet by mouth Every 8 (Eight) Hours As Needed for Nausea or Vomiting., Disp: 28 tablet, Rfl: 0    vitamin D (ERGOCALCIFEROL) 1.25 MG (12089 UT) capsule capsule, TAKE 1 CAPSULE BY MOUTH ONCE WEEKLY, Disp: 6  capsule, Rfl: 0    Probiotic, Lactobacillus, capsule, Take 1 tablet by mouth Daily., Disp: 90 capsule, Rfl: 3    Patient Active Problem List   Diagnosis    Abnormal bone density screening    Pap smear for cervical cancer screening    Allergic rhinitis    Anemia    Arthritis    Asthma    Depression    Dyspeptic diarrhea    Esophageal reflux    History of pulmonary embolism    Hypertension    Venous insufficiency of left leg    Limb swelling    Moderate episode of recurrent major depressive disorder    Muscle cramp, nocturnal    Renal insufficiency    Shortness of breath    Osteoarthritis of both knees    Class 2 severe obesity due to excess calories with serious comorbidity and body mass index (BMI) of 35.0 to 35.9 in adult        Past Surgical History:   Procedure Laterality Date    APPENDECTOMY       SECTION      CHOLECYSTECTOMY      COLONOSCOPY  2014    normal    GALLBLADDER SURGERY      HYSTERECTOMY      OTHER SURGICAL HISTORY      mass removal on neck    PLANTAR FASCIA SURGERY Left     TONSILLECTOMY         Social History     Socioeconomic History    Marital status:    Tobacco Use    Smoking status: Never     Passive exposure: Never    Smokeless tobacco: Never   Vaping Use    Vaping status: Never Used   Substance and Sexual Activity    Alcohol use: Not Currently    Drug use: Never    Sexual activity: Defer       Family History   Problem Relation Age of Onset    Hypertension Mother     Cancer Mother     Arthritis Mother     Osteoporosis Mother     Hypertension Father     Cancer Father     Other Father         blood diseases    Hypertension Sister     Cancer Sister     Arthritis Sister        Family history, surgical history, past medical history, Allergies and med's reviewed with patient today and updated in [employer] EMR.     ROS:  Review of Systems   HENT:  Positive for postnasal drip.    Respiratory: Negative.     Cardiovascular: Negative.    Gastrointestinal:  Positive for  diarrhea, nausea and vomiting.       OBJECTIVE:  Vitals:    04/11/25 1435   BP: 141/70   Pulse: 89   Resp: 18   Temp: 98 °F (36.7 °C)   TempSrc: Temporal   SpO2: 97%   Weight: 97.5 kg (215 lb)     Body mass index is 44.94 kg/m².  No LMP recorded. Patient has had a hysterectomy.    Physical Exam  Cardiovascular:      Rate and Rhythm: Normal rate and regular rhythm.      Pulses: Normal pulses.      Heart sounds: Normal heart sounds.   Pulmonary:      Effort: Pulmonary effort is normal.      Breath sounds: Normal breath sounds.   Neurological:      General: No focal deficit present.         Physical Exam      Procedures            Health Maintenance Due   Topic Date Due    MAMMOGRAM  05/03/2023    ANNUAL PHYSICAL  11/01/2024    COLORECTAL CANCER SCREENING  12/15/2024        No visits with results within 30 Day(s) from this visit.   Latest known visit with results is:   Office Visit on 12/12/2024   Component Date Value Ref Range Status    SARS Antigen 12/12/2024 Detected (A)  Not Detected, Presumptive Negative Final    Influenza A Antigen DANIELA 12/12/2024 Not Detected  Not Detected Final    Influenza B Antigen DANIELA 12/12/2024 Not Detected  Not Detected Final    Internal Control 12/12/2024 Passed  Passed Final    Lot Number 12/12/2024 [id number]   Final    Expiration Date 12/12/2024 10/23/2025   Final    Rapid Strep A Screen 12/12/2024 Negative  Negative, VALID, INVALID, Not Performed Final    Internal Control 12/12/2024 Passed  Passed Final    Lot Number 12/12/2024 [id number]   Final    Expiration Date 12/12/2024 02/16/2026   Final       Results        ASSESSMENT/ PLAN:    Diagnoses and all orders for this visit:    1. Nausea and vomiting, unspecified vomiting type (Primary)  -     ondansetron (Zofran) 4 MG tablet; Take 1 tablet by mouth Every 8 (Eight) Hours As Needed for Nausea or Vomiting.  Dispense: 28 tablet; Refill: 0    2. Diarrhea, unspecified type  -     Probiotic, Lactobacillus, capsule; Take 1 tablet by mouth Daily.   Dispense: 90 capsule; Refill: 3    3. PND (post-nasal drip)  -     methylPREDNISolone (MEDROL) 4 MG dose pack; Take as directed on package instructions.  Dispense: 21 tablet; Refill: 0        Assessment & Plan  1. Postnasal drip.  The vomiting and diarrhea are likely due to the mucus draining into her stomach. A prescription for Zofran will be provided to manage these symptoms. Oral steroids will be initiated to expedite the drying process. Once her stomach stabilizes, she is advised to consume yogurt or probiotics daily to restore beneficial bacteria. A bland diet, including bread, applesauce, bananas, and crackers, is recommended initially, with a gradual reintroduction of other foods. If there is no improvement by next week, she is instructed to contact the office.      Orders Placed Today:     New Medications Ordered This Visit   Medications    ondansetron (Zofran) 4 MG tablet     Sig: Take 1 tablet by mouth Every 8 (Eight) Hours As Needed for Nausea or Vomiting.     Dispense:  28 tablet     Refill:  0    methylPREDNISolone (MEDROL) 4 MG dose pack     Sig: Take as directed on package instructions.     Dispense:  21 tablet     Refill:  0    Probiotic, Lactobacillus, capsule     Sig: Take 1 tablet by mouth Daily.     Dispense:  90 capsule     Refill:  3        Management Plan:     An After Visit Summary was printed and given to the patient at discharge.    Follow-up: Return if symptoms worsen or fail to improve.    Patient or patient representative verbalized consent for the use of Ambient Listening during the visit with  ARIN Hollis for chart documentation. 4/11/2025  15:11 EDT    ARIN Hollis 4/11/2025 15:12 EDT  This note was electronically signed.

## 2025-04-11 NOTE — LETTER
April 11, 2025     Patient: Loan Nam   YOB: 1955   Date of Visit: 4/11/2025       To Whom It May Concern:    Loan Nam was seen in my office today 4/11/2025.          Sincerely,        ARIN Hollis    CC: No Recipients

## 2025-04-28 ENCOUNTER — TELEPHONE (OUTPATIENT)
Dept: FAMILY MEDICINE CLINIC | Facility: CLINIC | Age: 70
End: 2025-04-28
Payer: COMMERCIAL

## 2025-04-28 DIAGNOSIS — B37.9 YEAST INFECTION: Primary | ICD-10-CM

## 2025-04-28 RX ORDER — FLUCONAZOLE 150 MG/1
150 TABLET ORAL
Qty: 2 TABLET | Refills: 0 | Status: SHIPPED | OUTPATIENT
Start: 2025-04-28 | End: 2025-05-02

## 2025-04-28 NOTE — TELEPHONE ENCOUNTER
Caller: Loan Nam    Relationship: Self    Best call back number: [phone]     What medication are you requesting: DIFLUCAN    What are your current symptoms: YEAST INFECTION FROM TAKING ANTIBIOTICS    How long have you been experiencing symptoms: PAST FEW DAYS.    Have you had these symptoms before:    [x] Yes  [] No    Have you been treated for these symptoms before:   [x] Yes  [] No    If a prescription is needed, what is your preferred pharmacy and phone number: [facility] [id number]  [place] - [address] AT [facility] [address]  [phone] [facility] [phone] FX

## 2025-04-29 ENCOUNTER — OFFICE VISIT (OUTPATIENT)
Dept: FAMILY MEDICINE CLINIC | Facility: CLINIC | Age: 70
End: 2025-04-29
Payer: COMMERCIAL

## 2025-04-29 VITALS
BODY MASS INDEX: 46.39 KG/M2 | OXYGEN SATURATION: 98 % | DIASTOLIC BLOOD PRESSURE: 90 MMHG | SYSTOLIC BLOOD PRESSURE: 156 MMHG | HEART RATE: 64 BPM | WEIGHT: 221 LBS | HEIGHT: 58 IN

## 2025-04-29 DIAGNOSIS — I10 PRIMARY HYPERTENSION: ICD-10-CM

## 2025-04-29 DIAGNOSIS — S39.012A STRAIN OF LUMBAR REGION, INITIAL ENCOUNTER: Primary | ICD-10-CM

## 2025-04-29 RX ORDER — TRIAMCINOLONE ACETONIDE 40 MG/ML
40 INJECTION, SUSPENSION INTRA-ARTICULAR; INTRAMUSCULAR ONCE
Status: CANCELLED | OUTPATIENT
Start: 2025-04-29 | End: 2025-04-29

## 2025-04-29 RX ORDER — CYCLOBENZAPRINE HCL 10 MG
10 TABLET ORAL 3 TIMES DAILY PRN
Qty: 21 TABLET | Refills: 0 | Status: SHIPPED | OUTPATIENT
Start: 2025-04-29 | End: 2025-05-06

## 2025-04-29 NOTE — PROGRESS NOTES
Chief Complaint  Back Pain    Subjective            Loan Viv presents to [facility]  History of Present Illness  Pt has c/o back pain. Pt was seen at  on 25. UTI ruled out. Pt was given lidocaine patches. Pt was advised to f/u with PCP in 6 days. Pt reports NKI. Pt thinks she has a pulled muscle. Pt reports she does have h/o DDD. Pt reports she has been out of work for 2 days due to the pain. Pt states she has received muscle relaxers in the past for this and states this helps. PT declines imaging.  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    Pt reports her BP has been elevated recently. Pt states she will f/u with PCP for this. BP today is 156/90        Past Medical History:   Diagnosis Date    Acid reflux     Allergic rhinitis     Anemia     unspecified    Arthritis     2016 left knee    Asthma     Bile salt-induced diarrhea     Depression     H/O bone density study     never    History of pulmonary embolism     Hypertension     Limb swelling     Moderate episode of recurrent major depressive disorder 2020    Muscle cramp, nocturnal 2018    Pap smear for cervical cancer screening     no longer hysterectomy    Renal insufficiency 2021    Seasonal allergies     SOB (shortness of breath)     Venous insufficiency of left leg 2018    she needs to elevated nad wear compression hose. 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    Visit for screening mammogram 10/2019    normal       Allergies   Allergen Reactions    Contrast Dye (Echo Or Unknown Ct/Mr) Hives    Iodinated Contrast Media Hives    Norvasc [Amlodipine] Shortness Of Breath    Penicillins Swelling    Sulfa Antibiotics Rash        Past Surgical History:   Procedure Laterality Date    APPENDECTOMY       SECTION      CHOLECYSTECTOMY      COLONOSCOPY  2014    normal    GALLBLADDER SURGERY       HYSTERECTOMY  2004    OTHER SURGICAL HISTORY      mass removal on neck    PLANTAR FASCIA SURGERY Left     TONSILLECTOMY          Social History     Tobacco Use    Smoking status: Never     Passive exposure: Never    Smokeless tobacco: Never   Substance Use Topics    Alcohol use: Not Currently       Family History   Problem Relation Age of Onset    Hypertension Mother     Cancer Mother     Arthritis Mother     Osteoporosis Mother     Hypertension Father     Cancer Father     Other Father         blood diseases    Hypertension Sister     Cancer Sister     Arthritis Sister         Current Outpatient Medications on File Prior to Visit   Medication Sig    albuterol sulfate  (90 Base) MCG/ACT inhaler Inhale 2 puffs Every 4 (Four) Hours As Needed for Wheezing.    busPIRone (BUSPAR) 15 MG tablet TAKE 1 TABLET BY MOUTH 2 TIMES A DAY    Calcium 500 MG tablet Take 500 mg by mouth Daily.    carboxymethylcellulose sod (Dry Eye Relief) 1 % gel eye gel Administer 2 drops to both eyes 3 (Three) Times a Day As Needed (dry eye).    carvedilol (COREG) 3.125 MG tablet Take 2 tabs PO in the am and one tab pm    ciprofloxacin (CIPRO) 500 MG tablet Take 1 tablet by mouth 2 (Two) Times a Day for 5 days.    Cyanocobalamin (Vitamin B-12) 1000 MCG sublingual tablet Place 1 tablet under the tongue Daily.    fexofenadine (Allegra Allergy) 180 MG tablet Take 1 tablet by mouth Daily.    fluconazole (Diflucan) 150 MG tablet Take 1 tablet by mouth Every 72 (Seventy-Two) Hours for 2 doses.    fluticasone (FLONASE) 50 MCG/ACT nasal spray Administer 2 sprays into the nostril(s) as directed by provider Daily.    hydroCHLOROthiazide 25 MG tablet Take 1 tablet by mouth Daily for 180 days.    lidocaine (LIDODERM) 5 % Place 1 patch on the skin as directed by provider Daily for 5 days. Remove & Discard patch within 12 hours or as directed by MD    losartan (COZAAR) 50 MG tablet TAKE 2 TABLETS BY MOUTH DAILY    magnesium oxide (MAG-OX) 400 MG  "tablet Take 1 tablet by mouth Daily.    omeprazole (priLOSEC) 40 MG capsule TAKE 1 CAPSULE BY MOUTH DAILY    ondansetron (Zofran) 4 MG tablet Take 1 tablet by mouth Every 8 (Eight) Hours As Needed for Nausea or Vomiting.    Probiotic, Lactobacillus, capsule Take 1 tablet by mouth Daily.    vitamin D (ERGOCALCIFEROL) 1.25 MG (25482 UT) capsule capsule TAKE 1 CAPSULE BY MOUTH ONCE WEEKLY    [DISCONTINUED] methocarbamol (ROBAXIN) 500 MG tablet Take 1 tablet by mouth 3 (Three) Times a Day As Needed for Muscle Spasms. (Patient not taking: Reported on 4/29/2025)     No current facility-administered medications on file prior to visit.       Health Maintenance Due   Topic Date Due    MAMMOGRAM  05/03/2023    COVID-19 Vaccine (5 - 2024-25 season) 09/01/2024    ANNUAL PHYSICAL  11/01/2024    COLORECTAL CANCER SCREENING  12/15/2024       Objective     /90   Pulse 64   Ht 147.3 cm (58\")   Wt 100 kg (221 lb)   SpO2 98%   BMI 46.19 kg/m²       Physical Exam  Constitutional:       General: She is not in acute distress.     Appearance: Normal appearance. She is not ill-appearing.   HENT:      Head: Normocephalic and atraumatic.   Cardiovascular:      Rate and Rhythm: Normal rate and regular rhythm.      Heart sounds: Normal heart sounds. No murmur heard.  Pulmonary:      Effort: Pulmonary effort is normal. No respiratory distress.      Breath sounds: Normal breath sounds.   Chest:      Chest wall: No tenderness.   Musculoskeletal:         General: No swelling. Normal range of motion.      Cervical back: Normal range of motion and neck supple.      Lumbar back: Tenderness present. No swelling, edema or deformity. Normal range of motion.      Comments: Pain in lumber area with palpation and flexion   Skin:     General: Skin is warm and dry.      Findings: No rash.   Neurological:      General: No focal deficit present.      Mental Status: She is alert and oriented to person, place, and time. Mental status is at baseline. "      Gait: Gait normal.   Psychiatric:         Mood and Affect: Mood normal.         Behavior: Behavior normal.         Thought Content: Thought content normal.         Judgment: Judgment normal.           Result Review :                           Assessment and Plan        Diagnoses and all orders for this visit:    1. Strain of lumbar region, initial encounter (Primary)  Comments:  advised heat, massage and avoid heavy lifting if pain persists f/u with PCP  Orders:  -     cyclobenzaprine (FLEXERIL) 10 MG tablet; Take 1 tablet by mouth 3 (Three) Times a Day As Needed for Muscle Spasms for up to 7 days.  Dispense: 21 tablet; Refill: 0    2. Primary hypertension  Comments:  uncontrolled on current meds, adivsed to keep a bp log and make a f/u with PCP to discuss              Follow Up     Return if symptoms worsen or fail to improve.    Patient was given instructions and counseling regarding her condition or for health maintenance advice. Please see specific information pulled into the AVS if appropriate.     Loan Viv  reports that she has never smoked. She has never been exposed to tobacco smoke. She has never used smokeless tobacco.

## 2025-05-07 ENCOUNTER — TELEPHONE (OUTPATIENT)
Dept: FAMILY MEDICINE CLINIC | Facility: CLINIC | Age: 70
End: 2025-05-07

## 2025-05-07 ENCOUNTER — OFFICE VISIT (OUTPATIENT)
Dept: FAMILY MEDICINE CLINIC | Facility: CLINIC | Age: 70
End: 2025-05-07
Payer: COMMERCIAL

## 2025-05-07 VITALS
WEIGHT: 224 LBS | SYSTOLIC BLOOD PRESSURE: 157 MMHG | BODY MASS INDEX: 47.02 KG/M2 | DIASTOLIC BLOOD PRESSURE: 79 MMHG | OXYGEN SATURATION: 100 % | HEIGHT: 58 IN | HEART RATE: 84 BPM

## 2025-05-07 DIAGNOSIS — M51.360 DEGENERATION OF INTERVERTEBRAL DISC OF LUMBAR REGION WITH DISCOGENIC BACK PAIN: Primary | ICD-10-CM

## 2025-05-07 DIAGNOSIS — I10 PRIMARY HYPERTENSION: ICD-10-CM

## 2025-05-07 DIAGNOSIS — S39.012A STRAIN OF LUMBAR REGION, INITIAL ENCOUNTER: ICD-10-CM

## 2025-05-07 RX ORDER — LIDOCAINE 50 MG/G
1 PATCH TOPICAL EVERY 24 HOURS
Qty: 30 EACH | Refills: 0 | Status: SHIPPED | OUTPATIENT
Start: 2025-05-07

## 2025-05-07 RX ORDER — CARVEDILOL 12.5 MG/1
12.5 TABLET ORAL 2 TIMES DAILY WITH MEALS
Qty: 60 TABLET | Refills: 0 | Status: SHIPPED | OUTPATIENT
Start: 2025-05-07

## 2025-05-07 NOTE — TELEPHONE ENCOUNTER
"Clarification needed for Sig on patient's Carvedilol:    \"Sig: Take 1 tablet by mouth 2 (Two) Times a Day With Meals. Take 1 tablet in the morning and half a tablet at night.\"    Rx was sent for #60 but unsure if patient is supposed to be taking full or half tab at night.  "

## 2025-05-07 NOTE — TELEPHONE ENCOUNTER
Pharmacy Name: [facility] [id number] - [place], [place] - [address] AT [facility] [address] - [phone] [facility] [phone]        Pharmacy representative name: KATERIN    Pharmacy representative phone number: [phone]     What medication are you calling in regards to: CARVEDILOL (COREG) 12.5 MG TABLET    What question does the pharmacy have: CALLER STATES THERE ARE TWO SETS OF DIRECTIONS ON THE PRESCRIPTION. CALLER NEEDING CLARIFICATION ON WHICH DIRECTIONS NEED TO BE FOLLOWED.    Who is the provider that prescribed the medication: ARIN SOLO.

## 2025-05-07 NOTE — PROGRESS NOTES
Chief Complaint   Patient presents with    Hypertension        Linda Nam  has a past medical history of Acid reflux, Allergic rhinitis, Anemia, Arthritis, Asthma, Bile salt-induced diarrhea, Depression, H/O bone density study, History of pulmonary embolism, Hypertension, Limb swelling, Moderate episode of recurrent major depressive disorder (02/03/2020), Muscle cramp, nocturnal (07/17/2018), Pap smear for cervical cancer screening (2000), Renal insufficiency (04/12/2021), Seasonal allergies, SOB (shortness of breath), Venous insufficiency of left leg (07/17/2018), and Visit for screening mammogram (10/2019).    HPI    History of Present Illness  The patient is a 69-year-old female who presents today for a blood pressure check. Her blood pressure is 157/79 today. She is currently on 100 mg losartan, 25 mg hydrochlorothiazide, and takes 3.125 mg of Coreg, 2 tablets in the morning and 1 in the evening.    She reports an elevation in her blood pressure. She has been compliant with her medication regimen, which includes losartan 50 mg twice daily, hydrochlorothiazide 25 mg, and carvedilol 3.125 mg twice in the morning and once at night.    She also reports experiencing back pain, which she attributes to degenerative changes in her spine. The pain is intermittent, with periods of relief followed by flare-ups. She has not sought consultation from a neurosurgeon for her back pain. She suspects that recent lifting activities at work may have exacerbated her condition. She anticipates that her symptoms will improve once she ceases these activities. She has been prescribed muscle relaxants but refrains from taking them due to their sedative effects. She does not take Motrin and instead relies on Tylenol for pain management. She has previously used lidocaine patches, which provided some relief, but she has exhausted her supply. She plans to start using Biofreeze for additional relief.    Allergies   Allergen  Reactions    Contrast Dye (Echo Or Unknown Ct/Mr) Hives    Iodinated Contrast Media Hives    Norvasc [Amlodipine] Shortness Of Breath    Penicillins Swelling    Sulfa Antibiotics Rash         Current Outpatient Medications:     albuterol sulfate  (90 Base) MCG/ACT inhaler, Inhale 2 puffs Every 4 (Four) Hours As Needed for Wheezing., Disp: 18 g, Rfl: 2    busPIRone (BUSPAR) 15 MG tablet, TAKE 1 TABLET BY MOUTH 2 TIMES A DAY, Disp: 180 tablet, Rfl: 1    Calcium 500 MG tablet, Take 500 mg by mouth Daily., Disp: 30 tablet, Rfl: 1    carboxymethylcellulose sod (Dry Eye Relief) 1 % gel eye gel, Administer 2 drops to both eyes 3 (Three) Times a Day As Needed (dry eye)., Disp: 1 each, Rfl: 0    Cyanocobalamin (Vitamin B-12) 1000 MCG sublingual tablet, Place 1 tablet under the tongue Daily., Disp: 30 each, Rfl: 3    fexofenadine (Allegra Allergy) 180 MG tablet, Take 1 tablet by mouth Daily., Disp: 10 tablet, Rfl: 0    fluticasone (FLONASE) 50 MCG/ACT nasal spray, Administer 2 sprays into the nostril(s) as directed by provider Daily., Disp: 15.8 mL, Rfl: 0    hydroCHLOROthiazide 25 MG tablet, Take 1 tablet by mouth Daily for 180 days., Disp: 90 tablet, Rfl: 1    losartan (COZAAR) 50 MG tablet, TAKE 2 TABLETS BY MOUTH DAILY, Disp: 180 tablet, Rfl: 1    magnesium oxide (MAG-OX) 400 MG tablet, Take 1 tablet by mouth Daily., Disp: 30 tablet, Rfl: 1    omeprazole (priLOSEC) 40 MG capsule, TAKE 1 CAPSULE BY MOUTH DAILY, Disp: 90 capsule, Rfl: 1    ondansetron (Zofran) 4 MG tablet, Take 1 tablet by mouth Every 8 (Eight) Hours As Needed for Nausea or Vomiting., Disp: 28 tablet, Rfl: 0    Probiotic, Lactobacillus, capsule, Take 1 tablet by mouth Daily., Disp: 90 capsule, Rfl: 3    vitamin D (ERGOCALCIFEROL) 1.25 MG (99840 UT) capsule capsule, TAKE 1 CAPSULE BY MOUTH ONCE WEEKLY, Disp: 6 capsule, Rfl: 0    carvedilol (Coreg) 12.5 MG tablet, Take 1 tablet by mouth 2 (Two) Times a Day With Meals. Take 1 tablet in the morning and  half a tablet at night., Disp: 60 tablet, Rfl: 0    lidocaine (LIDODERM) 5 %, Place 1 patch on the skin as directed by provider Daily. Remove & Discard patch within 12 hours or as directed by MD, Disp: 30 each, Rfl: 0    Patient Active Problem List   Diagnosis    Abnormal bone density screening    Pap smear for cervical cancer screening    Allergic rhinitis    Anemia    Arthritis    Asthma    Depression    Dyspeptic diarrhea    Esophageal reflux    History of pulmonary embolism    Hypertension    Venous insufficiency of left leg    Limb swelling    Moderate episode of recurrent major depressive disorder    Muscle cramp, nocturnal    Renal insufficiency    Shortness of breath    Osteoarthritis of both knees    Class 2 severe obesity due to excess calories with serious comorbidity and body mass index (BMI) of 35.0 to 35.9 in adult        Past Surgical History:   Procedure Laterality Date    APPENDECTOMY       SECTION      CHOLECYSTECTOMY      COLONOSCOPY  2014    normal    GALLBLADDER SURGERY      HYSTERECTOMY      OTHER SURGICAL HISTORY      mass removal on neck    PLANTAR FASCIA SURGERY Left     TONSILLECTOMY         Social History     Socioeconomic History    Marital status:    Tobacco Use    Smoking status: Never     Passive exposure: Never    Smokeless tobacco: Never   Vaping Use    Vaping status: Never Used   Substance and Sexual Activity    Alcohol use: Not Currently    Drug use: Never    Sexual activity: Defer       Family History   Problem Relation Age of Onset    Hypertension Mother     Cancer Mother     Arthritis Mother     Osteoporosis Mother     Hypertension Father     Cancer Father     Other Father         blood diseases    Hypertension Sister     Cancer Sister     Arthritis Sister        Family history, surgical history, past medical history, Allergies and med's reviewed with patient today and updated in [employer] EMR.     ROS:  Review of Systems   Musculoskeletal:   "Positive for back pain.   All other systems reviewed and are negative.      OBJECTIVE:  Vitals:    05/07/25 0925   BP: 157/79   BP Location: Left arm   Patient Position: Sitting   Cuff Size: Adult   Pulse: 84   SpO2: 100%   Weight: 102 kg (224 lb)   Height: 147.3 cm (58\")     Body mass index is 46.82 kg/m².  No LMP recorded. Patient has had a hysterectomy.    Physical Exam  Cardiovascular:      Rate and Rhythm: Normal rate.   Pulmonary:      Effort: Pulmonary effort is normal.   Neurological:      General: No focal deficit present.      Mental Status: She is oriented to person, place, and time. Mental status is at baseline.   Psychiatric:         Mood and Affect: Mood normal.         Behavior: Behavior normal.         Thought Content: Thought content normal.         Judgment: Judgment normal.         Physical Exam  Cardiovascular: Heart rate is good    Procedures            Health Maintenance Due   Topic Date Due    MAMMOGRAM  05/03/2023    ANNUAL PHYSICAL  11/01/2024    COLORECTAL CANCER SCREENING  12/15/2024        Admission on 04/26/2025, Discharged on 04/26/2025   Component Date Value Ref Range Status    Color 04/26/2025 Yellow   Final    Clarity, UA 04/26/2025 Clear   Final    Glucose, UA 04/26/2025 Negative  mg/dL Final    Bilirubin 04/26/2025 Negative   Final    Ketones, UA 04/26/2025 Negative   Final    Specific Gravity  04/26/2025 1.015  1.005 - 1.030 Final    Blood, UA 04/26/2025 Negative   Final    pH, Urine 04/26/2025 6.0  5.0 - 8.0 Final    Protein, POC 04/26/2025 Negative  mg/dL Final    Urobilinogen, UA 04/26/2025 Normal   Final    Nitrite, UA 04/26/2025 Negative   Final    Leukocytes 04/26/2025 Negative   Final       Results        ASSESSMENT/ PLAN:    Diagnoses and all orders for this visit:    1. Degeneration of intervertebral disc of lumbar region with discogenic back pain (Primary)  -     lidocaine (LIDODERM) 5 %; Place 1 patch on the skin as directed by provider Daily. Remove & Discard patch " within 12 hours or as directed by MD  Dispense: 30 each; Refill: 0    2. Primary hypertension  -     carvedilol (Coreg) 12.5 MG tablet; Take 1 tablet by mouth 2 (Two) Times a Day With Meals. Take 1 tablet in the morning and half a tablet at night.  Dispense: 60 tablet; Refill: 0    3. Primary hypertension  Comments:  Patient is currently on Cozaar 50 mg, Coreg 3.125 mg twice daily and hydrochlorothiazide 25 mg. stable on these medicines at this time.  Orders:  -     carvedilol (Coreg) 12.5 MG tablet; Take 1 tablet by mouth 2 (Two) Times a Day With Meals. Take 1 tablet in the morning and half a tablet at night.  Dispense: 60 tablet; Refill: 0    4. Strain of lumbar region, initial encounter  -     lidocaine (LIDODERM) 5 %; Place 1 patch on the skin as directed by provider Daily. Remove & Discard patch within 12 hours or as directed by MD  Dispense: 30 each; Refill: 0      Discussed that blood pressure should be less than 140/90.  Patient should avoid NSAID use (ibuprofen/Advil/Motrin/naproxen/Aleve) and decongestant use (pseudoephedrine/phenylephrine) as these medications raise blood pressure.  Avoid frozen packaged, canned, and processed goods which have significant sodium and can raise blood pressure.  Patient encouraged to check blood pressure 2-3 times a week and keep a blood pressure log to bring in for next appointment.    HCTZ can cause elevated blood sugar, cholesterol, LDL, uric acid and increase potassium    Recommend RICE therapy: Rest, ice, compression, elevation.  Patient may alternate over-the-counter ibuprofen/Tylenol to help with pain/inflammation.  Can do home physical therapy exercises-resume regular activity as tolerated.  Informed to contact clinic if signs and symptoms do not improve or worsen.      Assessment & Plan  1. Hypertension.  - Blood pressure recorded at 157/79 mmHg today.  - Heart rate is within normal limits. Currently on maximum dosage of losartan and hydrochlorothiazide.  -  Discussed increasing the morning dose of carvedilol from 6.25 mg to 12.5 mg, while maintaining the evening dose of 3.125 mg.  - Prescription for the new dosage of carvedilol will be sent to the pharmacy. Blood work will be conducted prior to the next visit.    2. Back pain.  - Reports persistent back pain, likely exacerbated by lifting at work.  - Using Tylenol and lidocaine patches for pain relief.  - Discussed and agreed to send a prescription for lidocaine patches to the pharmacy.  - Advised to use Biofreeze and continue with the lidocaine patches to manage pain until work-related activities decrease.    Follow-up  The patient will follow up in 1 month.    Orders Placed Today:     New Medications Ordered This Visit   Medications    lidocaine (LIDODERM) 5 %     Sig: Place 1 patch on the skin as directed by provider Daily. Remove & Discard patch within 12 hours or as directed by MD     Dispense:  30 each     Refill:  0    carvedilol (Coreg) 12.5 MG tablet     Sig: Take 1 tablet by mouth 2 (Two) Times a Day With Meals. Take 1 tablet in the morning and half a tablet at night.     Dispense:  60 tablet     Refill:  0        Management Plan:     An After Visit Summary was printed and given to the patient at discharge.    Follow-up: Return in about 4 weeks (around 6/4/2025) for bp follow up .    Patient or patient representative verbalized consent for the use of Ambient Listening during the visit with  ARIN Hollis for chart documentation. 5/7/2025  11:12 EDT    ARIN Hollis 5/7/2025 11:11 EDT  This note was electronically signed.

## 2025-06-07 DIAGNOSIS — I10 PRIMARY HYPERTENSION: ICD-10-CM

## 2025-06-09 RX ORDER — CARVEDILOL 12.5 MG/1
TABLET ORAL
Qty: 45 TABLET | Refills: 3 | Status: SHIPPED | OUTPATIENT
Start: 2025-06-09

## 2025-07-14 DIAGNOSIS — I10 PRIMARY HYPERTENSION: ICD-10-CM

## 2025-07-14 RX ORDER — HYDROCHLOROTHIAZIDE 25 MG/1
25 TABLET ORAL DAILY
Qty: 90 TABLET | Refills: 1 | Status: SHIPPED | OUTPATIENT
Start: 2025-07-14

## 2025-08-01 ENCOUNTER — OFFICE VISIT (OUTPATIENT)
Dept: ORTHOPEDIC SURGERY | Facility: CLINIC | Age: 70
End: 2025-08-01
Payer: COMMERCIAL

## 2025-08-01 VITALS
BODY MASS INDEX: 47.02 KG/M2 | OXYGEN SATURATION: 97 % | HEIGHT: 58 IN | DIASTOLIC BLOOD PRESSURE: 83 MMHG | SYSTOLIC BLOOD PRESSURE: 131 MMHG | HEART RATE: 70 BPM | WEIGHT: 224 LBS

## 2025-08-01 DIAGNOSIS — M17.0 OSTEOARTHRITIS OF BOTH KNEES, UNSPECIFIED OSTEOARTHRITIS TYPE: Primary | ICD-10-CM

## 2025-08-01 RX ADMIN — TRIAMCINOLONE ACETONIDE 40 MG: 40 INJECTION, SUSPENSION INTRA-ARTICULAR; INTRAMUSCULAR at 12:20

## 2025-08-01 RX ADMIN — LIDOCAINE HYDROCHLORIDE 5 ML: 10 INJECTION, SOLUTION INFILTRATION; PERINEURAL at 12:20

## 2025-08-01 NOTE — PROGRESS NOTES
"Chief Complaint  Follow-up of the Right Knee and Follow-up of the Left Knee    Subjective      Loan Nam presents to [facility]     History of Present Illness  The patient is here today for a follow-up on her bilateral knee pain and osteoarthritis, which she has been managing conservatively with intermittent injections.    She was last seen in the office on 03/14/2025 and at that time received bilateral knee steroid injections. Prior to that, she received injections on 09/06/2024. She reports that her knees have been causing her discomfort for the past month, with the left knee being more problematic than the right. She experiences sharp pain in the left knee when it is positioned in a certain way. She has not had any recent falls or injuries. She has received several injections in her knees in the past, but she is unsure of the exact number.    SOCIAL HISTORY  Occupations: Cook at a school      Allergies   Allergen Reactions    Contrast Dye (Echo Or Unknown Ct/Mr) Hives    Iodinated Contrast Media Hives    Norvasc [Amlodipine] Shortness Of Breath    Penicillins Swelling    Sulfa Antibiotics Rash       Objective     Vital Signs:   Vitals:    08/01/25 1216   BP: 131/83   Pulse: 70   SpO2: 97%   Weight: 102 kg (224 lb)   Height: 147.3 cm (58\")     Body mass index is 46.82 kg/m².    I reviewed the patient's chief complaint, history of present illness, review of systems, past medical history, surgical history, family history, social history, medications, and allergy list.     Ortho Exam      Physical Exam  Musculoskeletal:  Bilateral knees: Tenderness noted to medial and patellofemoral joint line. Mild knee effusion bilaterally. -5 degrees extension, 115 flexion. Stable to varus and valgus stress.       Large Joint Arthrocentesis: R knee  Date/Time: 8/1/2025 12:20 PM  Consent given by: patient  Site marked: site marked  Timeout: Immediately prior to procedure a time out was called to " verify the correct patient, procedure, equipment, support staff and site/side marked as required   Supporting Documentation  Indications: pain   Procedure Details  Location: knee - R knee  Preparation: Patient was prepped and draped in the usual sterile fashion  Needle gauge: 21 G.  Medications administered: 5 mL lidocaine 1 %; 40 mg triamcinolone acetonide 40 MG/ML  Patient tolerance: patient tolerated the procedure well with no immediate complications      Large Joint Arthrocentesis: L knee  Date/Time: 8/1/2025 12:20 PM  Consent given by: patient  Site marked: site marked  Timeout: Immediately prior to procedure a time out was called to verify the correct patient, procedure, equipment, support staff and site/side marked as required   Supporting Documentation  Indications: pain   Procedure Details  Location: knee - L knee  Preparation: Patient was prepped and draped in the usual sterile fashion  Needle gauge: 21 G.  Medications administered: 5 mL lidocaine 1 %; 40 mg triamcinolone acetonide 40 MG/ML  Patient tolerance: patient tolerated the procedure well with no immediate complications       This injection documentation was Scribed for ARIN Ha by ANURADHA Marie.  08/01/25   12:21 EDT      Imaging Results (Most Recent)       None             Results           Assessment and Plan   Diagnoses and all orders for this visit:    1. Osteoarthritis of both knees, unspecified osteoarthritis type (Primary)    Other orders  -     Large Joint Arthrocentesis: R knee  -     Large Joint Arthrocentesis: L knee         Assessment & Plan  1. Bilateral knee pain:  Significant pain persists in both knees, with the left knee being worse. Conservative management with intermittent steroid injections has been ongoing. The last injections were administered on 03/14/2025, providing relief for several months. She has been experiencing increased pain for about a month and decided to proceed with another round of bilateral  knee steroid injections during this visit.     Patient was informed of possible adverse effects including but not limited to bleeding, damage to nerve, tendon or artery, increased blood sugar and increased blood pressure. Discussed possibility of a reaction from the injection.  Discussed the possibility that the injection may not completely improve or remove the pain.  Discussed the risk of infection.  Discussed the possibility of worsening pain after the injection.  Prior to the joint injection, an active timeout was performed in accordance with safety protocols.  This included verification of the patient's identity, correct site and procedure.  The medication, dosage and route of administration were confirmed.  Injection site was marked and the patient was placed in a seated position with the knee flexed at 90 degrees.  Patient consent was confirmed.  Bilateral knee steroid  was injected into the lateral knee joint compartment following sterile technique.  Band-Aid placed on injection site.  Patient tolerated well.      PROCEDURE    Bilateral knee steroid injections were performed today.      Tobacco Use: Low Risk  (8/3/2025)    Patient History     Smoking Tobacco Use: Never     Smokeless Tobacco Use: Never     Passive Exposure: Never     Patient reports that they are a nonsmoker; cessation education not applicable.                 Follow Up   No follow-ups on file.  There are no Patient Instructions on file for this visit.    Patient was given instructions and counseling regarding her condition or for health maintenance advice. Please see specific information pulled into the AVS if appropriate.     Patient or patient representative verbalized consent for the use of Ambient Listening during the visit with  ARIN Ha for chart documentation. 8/3/2025  21:26 EDT    Dictated Utilizing Dragon Dictation. Please note that portions of this note were completed with a voice recognition program. Part of this note  may be an electronic transcription/translation of spoken language to printed text using the Dragon Dictation System.

## 2025-08-03 RX ORDER — LIDOCAINE HYDROCHLORIDE 10 MG/ML
5 INJECTION, SOLUTION INFILTRATION; PERINEURAL
Status: COMPLETED | OUTPATIENT
Start: 2025-08-01 | End: 2025-08-01

## 2025-08-03 RX ORDER — TRIAMCINOLONE ACETONIDE 40 MG/ML
40 INJECTION, SUSPENSION INTRA-ARTICULAR; INTRAMUSCULAR
Status: COMPLETED | OUTPATIENT
Start: 2025-08-01 | End: 2025-08-01